# Patient Record
Sex: FEMALE | Race: BLACK OR AFRICAN AMERICAN | NOT HISPANIC OR LATINO | Employment: FULL TIME | URBAN - METROPOLITAN AREA
[De-identification: names, ages, dates, MRNs, and addresses within clinical notes are randomized per-mention and may not be internally consistent; named-entity substitution may affect disease eponyms.]

---

## 2017-01-15 ENCOUNTER — HOSPITAL ENCOUNTER (EMERGENCY)
Facility: HOSPITAL | Age: 22
Discharge: HOME | End: 2017-01-16
Attending: EMERGENCY MEDICINE
Payer: MEDICAID

## 2017-01-15 DIAGNOSIS — R42 DIZZINESS: ICD-10-CM

## 2017-01-15 DIAGNOSIS — R51.9 HEADACHE, UNSPECIFIED HEADACHE TYPE: ICD-10-CM

## 2017-01-15 DIAGNOSIS — R55 NEAR SYNCOPE: Primary | ICD-10-CM

## 2017-01-15 DIAGNOSIS — R42 LIGHTHEADEDNESS: ICD-10-CM

## 2017-01-15 LAB
B-HCG UR QL: NEGATIVE
BILIRUB UR QL STRIP: NEGATIVE
CLARITY UR REFRACT.AUTO: CLEAR
COLOR UR AUTO: YELLOW
CTP QC/QA: YES
GLUCOSE UR QL STRIP: NEGATIVE
HGB UR QL STRIP: NEGATIVE
KETONES UR QL STRIP: NEGATIVE
LEUKOCYTE ESTERASE UR QL STRIP: NEGATIVE
NITRITE UR QL STRIP: NEGATIVE
PH UR STRIP: 7 [PH] (ref 5–8)
PROT UR QL STRIP: NEGATIVE
SP GR UR STRIP: 1 (ref 1–1.03)
URN SPEC COLLECT METH UR: NORMAL
UROBILINOGEN UR STRIP-ACNC: NEGATIVE EU/DL

## 2017-01-15 PROCEDURE — 99283 EMERGENCY DEPT VISIT LOW MDM: CPT | Mod: 25

## 2017-01-15 PROCEDURE — 96360 HYDRATION IV INFUSION INIT: CPT

## 2017-01-15 PROCEDURE — 81025 URINE PREGNANCY TEST: CPT | Performed by: PHYSICIAN ASSISTANT

## 2017-01-15 PROCEDURE — 80053 COMPREHEN METABOLIC PANEL: CPT

## 2017-01-15 PROCEDURE — 81003 URINALYSIS AUTO W/O SCOPE: CPT

## 2017-01-15 PROCEDURE — 93005 ELECTROCARDIOGRAM TRACING: CPT

## 2017-01-15 PROCEDURE — 99285 EMERGENCY DEPT VISIT HI MDM: CPT | Mod: ,,, | Performed by: PHYSICIAN ASSISTANT

## 2017-01-15 PROCEDURE — 82962 GLUCOSE BLOOD TEST: CPT

## 2017-01-15 PROCEDURE — 93010 ELECTROCARDIOGRAM REPORT: CPT | Mod: ,,, | Performed by: INTERNAL MEDICINE

## 2017-01-15 PROCEDURE — 85025 COMPLETE CBC W/AUTO DIFF WBC: CPT

## 2017-01-15 PROCEDURE — 84443 ASSAY THYROID STIM HORMONE: CPT

## 2017-01-15 RX ORDER — ACETAMINOPHEN 325 MG/1
650 TABLET ORAL
Status: DISCONTINUED | OUTPATIENT
Start: 2017-01-15 | End: 2017-01-16 | Stop reason: HOSPADM

## 2017-01-15 RX ADMIN — SODIUM CHLORIDE 1000 ML: 0.9 INJECTION, SOLUTION INTRAVENOUS at 11:01

## 2017-01-15 NOTE — ED AVS SNAPSHOT
[facility]  [address]  [facility] [id number]               Bhargavi Hitchcock   1/15/2017  9:55 PM   ED    Description:  Female : 1995   Department:  [facility]           Your Care was Coordinated By:     Provider Role From To    Bruce Rolle MD Attending Provider 01/15/17 8583 --    Kathi Yang PA-C Physician Assistant 01/15/17 2989 --      Reason for Visit     Loss of Consciousness           Diagnoses this Visit        Comments    Near syncope    -  Primary     Lightheadedness         Dizziness         Headache, unspecified headache type           ED Disposition     ED Disposition Condition Comment    Discharge             To Do List           Follow-up Information     Schedule an appointment as soon as possible for a visit with [facility] - Internal Medicine.    Specialty:  Internal Medicine    Contact information:    [address] [facility] [id number] [phone]    Additional information:    [facility].        Schedule an appointment as soon as possible for a visit with [facility] - Cardiology.    Specialty:  Cardiology    Contact information:    [address] [facility] [id number] [phone]    Additional information:    3rd floor      [facility]     [facility] - 24/ Assistance  Registered nurses in the [facility] provide clinical advisement, health education, appointment booking, and other advisory services.  Call for this free service at [phone].             Medications           These medications were administered today        Dose Freq    sodium chloride 0.9% bolus 1,000 mL 1,000 mL Once    Starting on: 2017    Sig: Inject 1,000 mLs into the vein once.    Class: Normal    Route: Intravenous    Cosign for Ordering: Required by Bruce Rolle MD    acetaminophen tablet 650 mg 650 mg ED 1 Time    Sig: Take 2  "tablets (650 mg total) by mouth ED 1 Time.    Class: Normal    Route: Oral    Cosign for Ordering: Required by Bruce Rolle MD           Verify that the below list of medications is an accurate representation of the medications you are currently taking.  If none reported, the list may be blank. If incorrect, please contact your healthcare provider. Carry this list with you in case of emergency.           Current Medications     acetaminophen tablet 650 mg Take 2 tablets (650 mg total) by mouth ED 1 Time.    hydrocodone-acetaminophen (LORTAB) 7.5-500 mg/15 mL solution Take by mouth 4 (four) times daily as needed for Pain.           Clinical Reference Information           Your Vitals Were     BP Pulse Resp Height Weight Last Period    104/62 (Patient Position: Standing) 70 18 5' 2" (1.575 m) 59 kg (130 lb) 12/15/2016 (Approximate)    SpO2 BMI             100% 23.78 kg/m2         Allergies as of 1/16/2017     No Known Allergies      Immunizations Administered on Date of Encounter - 1/16/2017     None      ED Micro, Lab, POCT     Start Ordered       Status Ordering Provider    01/15/17 2359 01/15/17 2359  POCT glucose  Once      Final result     01/15/17 2326 01/15/17 2325  TSH  STAT      Final result     01/15/17 2322 01/15/17 2321  POCT glucose  Once      Acknowledged     01/15/17 2321 01/15/17 2321  CBC auto differential  STAT      Final result     01/15/17 2321 01/15/17 2321  Comprehensive metabolic panel  STAT      Final result     01/15/17 2321 01/15/17 2321  Urinalysis Clean Catch  STAT      Final result     01/15/17 2249 01/15/17 2248  POCT urine pregnancy  Once      Final result       ED Imaging Orders     None        Discharge Instructions       Follow up with your PCP and cardiology. Continue all at home medications. Return to the ED for any new or worsening symptoms, including those listed below.        Self-Care for Headaches  Most headaches aren't serious and can be relieved with self-care. But some " "headaches may be a sign of another health problem like eye trouble or high blood pressure. To find the best treatment, learn what kind of headaches you get. For tension headaches, self-care will usually help. To treat migraines, ask your healthcare provider for advice. It is also possible to get both tension and migraine headaches. Self-care involves relieving the pain and avoiding headache triggers if you can.    Ways to reduce pain and tension  Try these steps:  · Apply a cold compress or ice pack to the pain site.  · Drink fluids. If nausea makes it hard to drink, try sucking on ice.  · Rest. Protect yourself from bright light and loud noises.  · Calm your emotions by imagining a peaceful scene.  · Massage tight neck, shoulder, and head muscles.  · To relax muscles, soak in a hot bath or use a hot shower.  Use medicines  Aspirin or aspirin substitutes, such as ibuprofen and acetaminophen, can relieve headache. Remember: Never give aspirin to anyone 18 years old or younger because of the risk of developing Reye syndrome. Use pain medicines only when necessary.  Track your headaches  Keeping a headache diary can help you and your healthcare provider identify what's causing your headaches:  · Note when each headache happens.  · Identify your activities and the foods you've eaten 6 to 8 hours before the headache began.  · Look for any trends or "triggers."  Signs of tension headache  Any of the following can be signs:  · Dull pain or feeling of pressure in a tight band around your head  · Pain in your neck or shoulders  · Headache without a definite beginning or end  · Headache after an activity such as driving or working on a computer  Signs of migraine  Any of the following can be signs:  · Throbbing pain on one or both sides of your head  · Nausea or vomiting  · Extreme sensitivity to light, sound, and smells  · Bright spots, flashes, or other visual changes  · Pain or nausea so severe that you can't continue " "your daily activities  Call your healthcare provider   If you have any of the following symptoms, contact your healthcare provider:  · A headache that lingers after a recent injury or bump to the head.  · A fever with a stiff neck or pain when you bend your head toward your chest.  · A headache along with slurred speech, changes in your vision, or numbness or weakness in your arms or legs.  · A headache for longer than 3 days.  · Frequent headaches, especially in the morning.  · Headaches with seizures   · Seek immediate medical attention if you have a headache that you would call "the worst headache you have ever had."   © 7983-4310 [employer]. [address] [place] [address], [place] [address]. All rights reserved. This information is not intended as a substitute for professional medical care. Always follow your healthcare professional's instructions.        Near-Fainting: Uncertain Cause  Fainting (syncope) is a temporary loss of consciousness ("passing out"). This happens when blood flow to the brain is reduced. Near-fainting ("near-syncope") is like fainting, but you do not fully "pass out." Instead, you feel like you are going to pass out, but do not actually lose consciousness.  Signs and symptoms  The following are symptoms of near-fainting:  · Feeling lightheaded or like you are going to faint  · Weak pulse  · Nausea  · Sweating  · Blurred vision  · Palpitations  · Chest pain  · Hard time breathing  · Feeling cool and clammy  Causes  This happens when your blood pressure suddenly drops, and not enough blood flows to your brain.  Common minor causes include:  · Sudden emotional stress such as fear, pain, panic, sight of blood  · Straining or overexertion, such as straining while using the toilet, coughing, sneezing  · Standing up too quickly, or standing up for too long a time  · Pregnancy  More serious causes include:  · Very slow, fast, or irregular heart rate, an arrhythmia  · Dehydration  · Blood " loss  · Medications, especially blood pressure or heart medicines, or your medicines have recently changed  · Heart attack  · Heart valve problems  Remember, even minor causes can become serious if you fall and injure yourself, or are driving. The exact cause of your episode is not certain. More tests may be needed. It is very important that you follow up with your doctor as advised.  Home care  The following guidelines will help you care for yourself at home:  1. Rest today. Resume your normal activities as soon as you are feeling back to normal.  2. If you become lightheaded or dizzy, lie down right away or sit with your head between your knees.  3. Because we do not know the exact cause of your near fainting spell, another spell could occur without warning. Therefore, do not drive a car or use dangerous equipment. Do not take a bath alone (use a shower instead). Do not swim alone. You can resume these activities when your doctor says that you are no longer in danger of having a near fainting spell.  4. Stay well hydrated by drinking enough fluid each day.  Follow-up care  Follow up with your doctor as instructed.  Call 911  Call 911 if any of the following occur:  · Another fainting spell occurs, and it is not explained by the common causes listed above  · Chest, arm, neck, jaw, back or abdominal pain  · Shortness of breath  · Weakness, tingling, or numbness in one side of the face, one arm or leg  · Slurred speech, confusion, trouble walking or seeing  · Seizure  · Blood in vomit or stools (black or red color)  When to seek medical care  Get prompt medical attention if any of the following occur:  · Recent changes in your medications  · Occasional mild lightheadedness, especially when standing up too quickly or straining  · (In women) unexpected vaginal bleeding  © 8076-4490 [employer]. [address] [place] [address], [place] [address]. All rights reserved. This information is not intended as a  substitute for professional medical care. Always follow your healthcare professional's instructions.             [facility] complies with applicable Federal civil rights laws and does not discriminate on the basis of race, color, national origin, age, disability, or sex.        Language Assistance Services     ATTENTION: Language assistance services are available, free of charge. Please call [phone].      ATENCIÓN: Si habla español, tiene a rizo disposición servicios gratuitos de asistencia lingüística. Llame al [phone].     CHÚ Ý: N?u b?n nói Ti?ng Vi?t, có các d?ch v? h? tr? ngôn ng? mi?n phí dành cho b?n. G?i s? [phone].

## 2017-01-16 VITALS
HEART RATE: 66 BPM | OXYGEN SATURATION: 98 % | BODY MASS INDEX: 23.92 KG/M2 | DIASTOLIC BLOOD PRESSURE: 67 MMHG | WEIGHT: 130 LBS | HEIGHT: 62 IN | RESPIRATION RATE: 16 BRPM | SYSTOLIC BLOOD PRESSURE: 99 MMHG

## 2017-01-16 LAB
ALBUMIN SERPL BCP-MCNC: 3.9 G/DL
ALP SERPL-CCNC: 46 U/L
ALT SERPL W/O P-5'-P-CCNC: 13 U/L
ANION GAP SERPL CALC-SCNC: 9 MMOL/L
AST SERPL-CCNC: 20 U/L
BASOPHILS # BLD AUTO: 0.08 K/UL
BASOPHILS NFR BLD: 0.7 %
BILIRUB SERPL-MCNC: 0.2 MG/DL
BUN SERPL-MCNC: 6 MG/DL
CALCIUM SERPL-MCNC: 9 MG/DL
CHLORIDE SERPL-SCNC: 105 MMOL/L
CO2 SERPL-SCNC: 26 MMOL/L
CREAT SERPL-MCNC: 0.8 MG/DL
DIFFERENTIAL METHOD: ABNORMAL
EOSINOPHIL # BLD AUTO: 0.2 K/UL
EOSINOPHIL NFR BLD: 2.1 %
ERYTHROCYTE [DISTWIDTH] IN BLOOD BY AUTOMATED COUNT: 15.2 %
EST. GFR  (AFRICAN AMERICAN): >60 ML/MIN/1.73 M^2
EST. GFR  (NON AFRICAN AMERICAN): >60 ML/MIN/1.73 M^2
GLUCOSE SERPL-MCNC: 97 MG/DL
HCT VFR BLD AUTO: 34.8 %
HGB BLD-MCNC: 11.2 G/DL
LYMPHOCYTES # BLD AUTO: 4.7 K/UL
LYMPHOCYTES NFR BLD: 41.7 %
MCH RBC QN AUTO: 25.9 PG
MCHC RBC AUTO-ENTMCNC: 32.2 %
MCV RBC AUTO: 80 FL
MONOCYTES # BLD AUTO: 0.8 K/UL
MONOCYTES NFR BLD: 7 %
NEUTROPHILS # BLD AUTO: 5.4 K/UL
NEUTROPHILS NFR BLD: 48.2 %
PLATELET # BLD AUTO: 248 K/UL
PMV BLD AUTO: 11.2 FL
POCT GLUCOSE: 107 MG/DL (ref 70–110)
POTASSIUM SERPL-SCNC: 3.4 MMOL/L
PROT SERPL-MCNC: 7.4 G/DL
RBC # BLD AUTO: 4.33 M/UL
SODIUM SERPL-SCNC: 140 MMOL/L
TSH SERPL DL<=0.005 MIU/L-ACNC: 2.57 UIU/ML
WBC # BLD AUTO: 11.19 K/UL

## 2017-01-16 PROCEDURE — 25000003 PHARM REV CODE 250: Performed by: PHYSICIAN ASSISTANT

## 2017-01-16 NOTE — ED NOTES
LOC: The patient is awake and alert; oriented x 3 and speaking appropriately.  APPEARANCE: Patient resting comfortably, patient is clean and well groomed  SKIN: warm and dry, normal skin turgor & moist mucus membranes, skin intact, no breakdown noted.  MUSCULOSKELETAL: Patient moving all extremities well, no obvious swelling or deformities noted  RESPIRATORY: Airway is open and patent, breath sounds clear throughout all lung fields; respirations are spontaneous, normal effort and rate  CARDIAC: Patient has a normal rate, no peripheral edema noted, capillary refill < 3 seconds; No complaints of chest pain   ABDOMEN: Soft and non tender to palpation, no distention noted. Bowel sounds present x 4

## 2017-01-16 NOTE — ED PROVIDER NOTES
"Encounter Date: 1/15/2017    SCRIBE #1 NOTE: I, Kenisha Blankenship MD, am scribing for, and in the presence of,  Bruce Rolle MD. I have scribed the following portions of the note - the EKG reading.       History     Chief Complaint   Patient presents with    Loss of Consciousness     near syncopal while at work.      Review of patient's allergies indicates:  No Known Allergies  HPI Comments: 21-year-old -American female past medical history of a benign right breast tumor, depression, anxiety, PTSD presents to the ED complaining of an episode of near syncope while at work today.  She works as a  and was helping a customer when she began very lightheaded and dizzy and fell backwards.  She was caught by her coworker and denies any head trauma or loss of consciousness.  She presented to the ED via EMS.  She developed a left-sided headache after this episode which she reports is a 2-3/10 "dull".  She's been having these intermittent episodes for years and has been worked up with imaging in the past.  After workup was initiated, she lost insurance and has not been seen for this since.  The last time she had an episode of near syncope was about 6 months ago but has been having intermittent lightheadedness since then.  She does report some dysuria, vaginal discharge, vaginal irritation for the past week.  She denies fever, chills, chest pain, shortness of breath, numbness, weakness, changes in vision, changes in speech, changes in gait, abdominal pain, nausea, vomiting, hematuria, vaginal bleeding.  She smokes marijuana, recreationally uses ecstasy and cocaine and denies tobacco or alcohol use.    The history is provided by the patient.     Past Medical History   Diagnosis Date    Anxiety     Depression     PTSD (post-traumatic stress disorder)     Tumor of breast      benign tumor in right breast removed     No past medical history pertinent negatives.  Past Surgical History   Procedure Laterality Date "    Breast surgery       Tumor removed from right breast    Tonsillectomy      Nasal septum surgery      Nose surgery       Family History   Problem Relation Age of Onset    Breast cancer Neg Hx     Colon cancer Neg Hx     Ovarian cancer Neg Hx      Social History   Substance Use Topics    Smoking status: Never Smoker    Smokeless tobacco: Never Used    Alcohol use Yes      Comment: social     Review of Systems   Constitutional: Positive for chills. Negative for fever.   HENT: Negative for congestion, rhinorrhea and sore throat.    Eyes: Negative for photophobia and visual disturbance.   Respiratory: Negative for shortness of breath.    Cardiovascular: Negative for chest pain, palpitations and leg swelling.   Gastrointestinal: Negative for abdominal pain, constipation, diarrhea, nausea and vomiting.   Genitourinary: Positive for dysuria and vaginal discharge. Negative for hematuria and vaginal bleeding.   Musculoskeletal: Negative for back pain, gait problem, neck pain and neck stiffness.   Skin: Negative for rash and wound.   Neurological: Positive for dizziness, light-headedness and headaches. Negative for tremors, seizures, syncope, facial asymmetry, speech difficulty, weakness and numbness.   Psychiatric/Behavioral: Negative for confusion.       Physical Exam   Initial Vitals   BP Pulse Resp Temp SpO2   01/15/17 2030 01/15/17 2030 01/15/17 2030 -- 01/15/17 2030   108/77 70 18  100 %     Physical Exam    Nursing note and vitals reviewed.  Constitutional: She appears well-developed and well-nourished. She is not diaphoretic. No distress.   HENT:   Head: Normocephalic and atraumatic.   Eyes: EOM are normal. Pupils are equal, round, and reactive to light.   Neck: Normal range of motion. Neck supple.   Cardiovascular: Normal rate, regular rhythm and normal heart sounds. Exam reveals no gallop and no friction rub.    No murmur heard.  Pulmonary/Chest: Breath sounds normal. She has no wheezes. She has no  rhonchi. She has no rales.   Abdominal: Soft. Bowel sounds are normal. There is no tenderness. There is no rebound and no guarding.   Neurological: She is alert and oriented to person, place, and time. She has normal strength. No cranial nerve deficit or sensory deficit. She displays a negative Romberg sign. Coordination and gait normal. GCS eye subscore is 4. GCS verbal subscore is 5. GCS motor subscore is 6.   Normal finger to nose and rapid alternating movements   Skin: Skin is warm and dry. No rash noted. No erythema.         ED Course   Procedures  Labs Reviewed   CBC W/ AUTO DIFFERENTIAL - Abnormal; Notable for the following:        Result Value    Hemoglobin 11.2 (*)     Hematocrit 34.8 (*)     MCV 80 (*)     MCH 25.9 (*)     RDW 15.2 (*)     All other components within normal limits   COMPREHENSIVE METABOLIC PANEL - Abnormal; Notable for the following:     Potassium 3.4 (*)     Alkaline Phosphatase 46 (*)     All other components within normal limits   URINALYSIS   TSH   POCT URINE PREGNANCY   POCT GLUCOSE   POCT GLUCOSE MONITORING CONTINUOUS     EKG Readings: (Independently Interpreted)   EKG: NSR, no DENIZ's or STD's, non-specific twave pattern, no STEMI            Medical Decision Making:   History:   Old Medical Records: I decided to obtain old medical records.  Old Records Summarized: records from previous admission(s).       <> Summary of Records: Seen in the ED in June 2016 with similar complaints.  CT head at that time with no abnormalities.   Independently Interpreted Test(s):   I have ordered and independently interpreted EKG Reading(s) - see prior notes  Clinical Tests:   Lab Tests: Ordered and Reviewed  Medical Tests: Ordered and Reviewed       APC / Resident Notes:   21-year-old -American female past medical history of a benign right breast tumor, depression, anxiety, PTSD presents to the ED complaining of an episode of near syncope while at work today.  Vital signs stable.  Regular rate  and rhythm without murmurs.  Lungs are clear to auscultation bilaterally without wheezes.  Abdomen is soft and nontender to palpation.  Neurologically intact without any focal neuro deficits.  Differential diagnosis includes but isn't limited to vasovagal syncope, hypotension, hypoglycemia, anemia, pregnancy, dehydration, anxiety, electrolyte abnormality, UTI, cardiac arrhythmia.  Will get labs, give IV fluids and reassess.    EKG shows normal sinus rhythm with no STEMI or signs of ischemia.    UPT negative.  Fingerstick glucose 107.  CBC with no signs of leukocytosis with WBC 11.19.  Minimal anemia noted with H/H 11.2/34.8.  CMP shows minimal hypokalemia with potassium of lab 3.4. No other acute abnormalities.  UA with no signs of infection.  TSH WNL at 2.574.     She declined pelvic exam in the ED for workup of vaginal discharge stating that she will follow up with her PCP.     I do not feel the patient is a further labs or imaging at this time.  Stable for discharge.    She was discharged without any prescriptions.  She will follow up with her PCP and cardiology.  All of the patient's questions were answered.  I reviewed the patient's chart and labs and discussed the case with my supervising physician.          Scribe Attestation:   Scribe #1: I performed the above scribed service and the documentation accurately describes the services I performed. I attest to the accuracy of the note.    Attending Attestation:     Physician Attestation Statement for NP/PA:   I discussed this assessment and plan of this patient with the NP/PA, but I did not personally examine the patient. The face to face encounter was performed by the NP/PA.        Physician Attestation for Scribe:  Physician Attestation Statement for Scribe #1: I, Bruce Rolle MD, reviewed documentation, as scribed by Kenisha Blankenship in my presence, and it is both accurate and complete.                 ED Course     Clinical Impression:   The primary encounter  diagnosis was Near syncope. Diagnoses of Lightheadedness, Dizziness, and Headache, unspecified headache type were also pertinent to this visit.    Disposition:   Disposition: Discharged  Condition: Stable       Kathi Yang PA-C  01/16/17 0101       Bruce Rolle MD  01/16/17 0339

## 2017-01-16 NOTE — DISCHARGE INSTRUCTIONS
"Follow up with your PCP and cardiology. Continue all at home medications. Return to the ED for any new or worsening symptoms, including those listed below.        Self-Care for Headaches  Most headaches aren't serious and can be relieved with self-care. But some headaches may be a sign of another health problem like eye trouble or high blood pressure. To find the best treatment, learn what kind of headaches you get. For tension headaches, self-care will usually help. To treat migraines, ask your healthcare provider for advice. It is also possible to get both tension and migraine headaches. Self-care involves relieving the pain and avoiding headache triggers if you can.    Ways to reduce pain and tension  Try these steps:  · Apply a cold compress or ice pack to the pain site.  · Drink fluids. If nausea makes it hard to drink, try sucking on ice.  · Rest. Protect yourself from bright light and loud noises.  · Calm your emotions by imagining a peaceful scene.  · Massage tight neck, shoulder, and head muscles.  · To relax muscles, soak in a hot bath or use a hot shower.  Use medicines  Aspirin or aspirin substitutes, such as ibuprofen and acetaminophen, can relieve headache. Remember: Never give aspirin to anyone 18 years old or younger because of the risk of developing Reye syndrome. Use pain medicines only when necessary.  Track your headaches  Keeping a headache diary can help you and your healthcare provider identify what's causing your headaches:  · Note when each headache happens.  · Identify your activities and the foods you've eaten 6 to 8 hours before the headache began.  · Look for any trends or "triggers."  Signs of tension headache  Any of the following can be signs:  · Dull pain or feeling of pressure in a tight band around your head  · Pain in your neck or shoulders  · Headache without a definite beginning or end  · Headache after an activity such as driving or working on a computer  Signs of " "migraine  Any of the following can be signs:  · Throbbing pain on one or both sides of your head  · Nausea or vomiting  · Extreme sensitivity to light, sound, and smells  · Bright spots, flashes, or other visual changes  · Pain or nausea so severe that you can't continue your daily activities  Call your healthcare provider   If you have any of the following symptoms, contact your healthcare provider:  · A headache that lingers after a recent injury or bump to the head.  · A fever with a stiff neck or pain when you bend your head toward your chest.  · A headache along with slurred speech, changes in your vision, or numbness or weakness in your arms or legs.  · A headache for longer than 3 days.  · Frequent headaches, especially in the morning.  · Headaches with seizures   · Seek immediate medical attention if you have a headache that you would call "the worst headache you have ever had."   © 0506-3626 [employer]. [address] [place] [address]. All rights reserved. This information is not intended as a substitute for professional medical care. Always follow your healthcare professional's instructions.        Near-Fainting: Uncertain Cause  Fainting (syncope) is a temporary loss of consciousness ("passing out"). This happens when blood flow to the brain is reduced. Near-fainting ("near-syncope") is like fainting, but you do not fully "pass out." Instead, you feel like you are going to pass out, but do not actually lose consciousness.  Signs and symptoms  The following are symptoms of near-fainting:  · Feeling lightheaded or like you are going to faint  · Weak pulse  · Nausea  · Sweating  · Blurred vision  · Palpitations  · Chest pain  · Hard time breathing  · Feeling cool and clammy  Causes  This happens when your blood pressure suddenly drops, and not enough blood flows to your brain.  Common minor causes include:  · Sudden emotional stress such as fear, pain, panic, sight of " blood  · Straining or overexertion, such as straining while using the toilet, coughing, sneezing  · Standing up too quickly, or standing up for too long a time  · Pregnancy  More serious causes include:  · Very slow, fast, or irregular heart rate, an arrhythmia  · Dehydration  · Blood loss  · Medications, especially blood pressure or heart medicines, or your medicines have recently changed  · Heart attack  · Heart valve problems  Remember, even minor causes can become serious if you fall and injure yourself, or are driving. The exact cause of your episode is not certain. More tests may be needed. It is very important that you follow up with your doctor as advised.  Home care  The following guidelines will help you care for yourself at home:  1. Rest today. Resume your normal activities as soon as you are feeling back to normal.  2. If you become lightheaded or dizzy, lie down right away or sit with your head between your knees.  3. Because we do not know the exact cause of your near fainting spell, another spell could occur without warning. Therefore, do not drive a car or use dangerous equipment. Do not take a bath alone (use a shower instead). Do not swim alone. You can resume these activities when your doctor says that you are no longer in danger of having a near fainting spell.  4. Stay well hydrated by drinking enough fluid each day.  Follow-up care  Follow up with your doctor as instructed.  Call 911  Call 911 if any of the following occur:  · Another fainting spell occurs, and it is not explained by the common causes listed above  · Chest, arm, neck, jaw, back or abdominal pain  · Shortness of breath  · Weakness, tingling, or numbness in one side of the face, one arm or leg  · Slurred speech, confusion, trouble walking or seeing  · Seizure  · Blood in vomit or stools (black or red color)  When to seek medical care  Get prompt medical attention if any of the following occur:  · Recent changes in your  medications  · Occasional mild lightheadedness, especially when standing up too quickly or straining  · (In women) unexpected vaginal bleeding  © 9566-3447 [employer]. [address] [place] [address], [place] [address]. All rights reserved. This information is not intended as a substitute for professional medical care. Always follow your healthcare professional's instructions.

## 2017-01-16 NOTE — ED TRIAGE NOTES
"Pt presents after near syncopal episode today at work. Pt states she has been "seeing stars" for a dew days. Pt reports that this has happened several times in the past and she was not given a dx. Pt states she is hypoglycemic but has been eating regularly. Pt also reports HA and states she has been getting frequent HA which are usually to the front of her head or a sharp pain to the left temple.  "

## 2017-10-30 ENCOUNTER — LAB VISIT (OUTPATIENT)
Dept: LAB | Facility: OTHER | Age: 22
End: 2017-10-30
Attending: STUDENT IN AN ORGANIZED HEALTH CARE EDUCATION/TRAINING PROGRAM
Payer: MEDICAID

## 2017-10-30 ENCOUNTER — OFFICE VISIT (OUTPATIENT)
Dept: OBSTETRICS AND GYNECOLOGY | Facility: CLINIC | Age: 22
End: 2017-10-30
Payer: MEDICAID

## 2017-10-30 VITALS
SYSTOLIC BLOOD PRESSURE: 110 MMHG | HEIGHT: 61 IN | WEIGHT: 137.56 LBS | DIASTOLIC BLOOD PRESSURE: 64 MMHG | BODY MASS INDEX: 25.97 KG/M2

## 2017-10-30 DIAGNOSIS — Z01.419 WOMEN'S ANNUAL ROUTINE GYNECOLOGICAL EXAMINATION: Primary | ICD-10-CM

## 2017-10-30 DIAGNOSIS — Z01.419 WOMEN'S ANNUAL ROUTINE GYNECOLOGICAL EXAMINATION: ICD-10-CM

## 2017-10-30 LAB
CANDIDA RRNA VAG QL PROBE: NEGATIVE
G VAGINALIS RRNA GENITAL QL PROBE: POSITIVE
T VAGINALIS RRNA GENITAL QL PROBE: NEGATIVE

## 2017-10-30 PROCEDURE — 87591 N.GONORRHOEAE DNA AMP PROB: CPT

## 2017-10-30 PROCEDURE — 88175 CYTOPATH C/V AUTO FLUID REDO: CPT

## 2017-10-30 PROCEDURE — 99999 PR PBB SHADOW E&M-EST. PATIENT-LVL III: CPT | Mod: PBBFAC,,, | Performed by: STUDENT IN AN ORGANIZED HEALTH CARE EDUCATION/TRAINING PROGRAM

## 2017-10-30 PROCEDURE — 99213 OFFICE O/P EST LOW 20 MIN: CPT | Mod: PBBFAC,PO | Performed by: STUDENT IN AN ORGANIZED HEALTH CARE EDUCATION/TRAINING PROGRAM

## 2017-10-30 PROCEDURE — 36415 COLL VENOUS BLD VENIPUNCTURE: CPT

## 2017-10-30 PROCEDURE — 87102 FUNGUS ISOLATION CULTURE: CPT

## 2017-10-30 PROCEDURE — 86592 SYPHILIS TEST NON-TREP QUAL: CPT

## 2017-10-30 PROCEDURE — 87480 CANDIDA DNA DIR PROBE: CPT

## 2017-10-30 PROCEDURE — 86703 HIV-1/HIV-2 1 RESULT ANTBDY: CPT

## 2017-10-30 PROCEDURE — 87660 TRICHOMONAS VAGIN DIR PROBE: CPT

## 2017-10-30 PROCEDURE — 87106 FUNGI IDENTIFICATION YEAST: CPT

## 2017-10-30 PROCEDURE — 87624 HPV HI-RISK TYP POOLED RSLT: CPT

## 2017-10-30 PROCEDURE — 80074 ACUTE HEPATITIS PANEL: CPT

## 2017-10-30 PROCEDURE — 99385 PREV VISIT NEW AGE 18-39: CPT | Mod: S$PBB,,, | Performed by: STUDENT IN AN ORGANIZED HEALTH CARE EDUCATION/TRAINING PROGRAM

## 2017-10-30 PROCEDURE — 87107 FUNGI IDENTIFICATION MOLD: CPT

## 2017-10-30 NOTE — PROGRESS NOTES
History & Physical  Gynecology      SUBJECTIVE:     Chief Complaint: Gynecologic Exam (complains of recurrent yeast and BV infections, also would like to be recheck for chlamydia )     History of Present Illness:  Patient is here for annual well-woman exam, age 21.    Patient is sexually active. Remote history of chlamydia. S/p treatment, re-infection, and re-treatment. No current sexual partner. Not on birth control. Previously has used imp/nexplanon, nuvaring, pills, depo provera. Has not liked how she felt on any BC. Declines BC today.     Patient reports recurrent BV. Thin discharge, erythema of vagina/vulva, pruritis. Also has yeast infections occasionally. Does not think she has an infection at this time. LMP was last week. Periods are regular with normal flow.     - Vaccines: discussed flu shot, s/p gardisil as an adolescent  - Labs: TSH not indicated; Lipid panel not indicated  - STD: GCCT today  - Pap: due today  - Breast: MMG at age 40   - Colonoscopy: at age 40-50  - DEXA: due at age 65    Review of patient's allergies indicates:  No Known Allergies    Past Medical History:   Diagnosis Date    Anxiety     Depression     PTSD (post-traumatic stress disorder)     Tumor of breast     benign tumor in right breast removed     Past Surgical History:   Procedure Laterality Date    BREAST SURGERY      Tumor removed from right breast    NASAL SEPTUM SURGERY      NOSE SURGERY      TONSILLECTOMY       OB History      Para Term  AB Living    0 0 0 0 0 0    SAB TAB Ectopic Multiple Live Births    0 0 0 0          Family History   Problem Relation Age of Onset    Breast cancer Neg Hx     Colon cancer Neg Hx     Ovarian cancer Neg Hx      Social History   Substance Use Topics    Smoking status: Light Tobacco Smoker    Smokeless tobacco: Never Used    Alcohol use Yes      Comment: social       Current Outpatient Prescriptions   Medication Sig    hydrocodone-acetaminophen (LORTAB) 7.5-500  mg/15 mL solution Take by mouth 4 (four) times daily as needed for Pain.     No current facility-administered medications for this visit.      Review of Systems:  Review of Systems   Constitutional: Negative for activity change, chills and fatigue.   Eyes: Negative for visual disturbance.   Respiratory: Negative for shortness of breath.    Cardiovascular: Negative for chest pain and palpitations.   Gastrointestinal: Negative for abdominal pain, constipation, diarrhea, nausea and vomiting.   Genitourinary: Negative for dysuria, vaginal bleeding, vaginal discharge, vaginal pain and vaginal odor.   Musculoskeletal: Negative for myalgias.   Skin:  Negative for rash.   Neurological: Negative for headaches.   Psychiatric/Behavioral: Negative for depression.        OBJECTIVE:     Physical Exam:  Physical Exam   Constitutional: She is oriented to person, place, and time. She appears well-developed and well-nourished.   HENT:   Head: Normocephalic and atraumatic.   Eyes: EOM are normal. Pupils are equal, round, and reactive to light.   Neck: Normal range of motion. Neck supple. No tracheal deviation present. No thyromegaly present.   Cardiovascular: Normal rate and regular rhythm.    Pulmonary/Chest: Effort normal. Right breast exhibits no inverted nipple, no mass, no nipple discharge, no skin change and no tenderness. Left breast exhibits no inverted nipple, no mass, no nipple discharge, no skin change and no tenderness.   Abdominal: Soft.   Genitourinary: Vagina normal and uterus normal.   Musculoskeletal: Normal range of motion.   Neurological: She is alert and oriented to person, place, and time.   Skin: Skin is warm and dry.   Psychiatric: She has a normal mood and affect.   Nursing note and vitals reviewed.    ASSESSMENT:       ICD-10-CM ICD-9-CM    1. Women's annual routine gynecological examination Z01.419 V72.31 Liquid-based pap smear, screening      CULTURE, FUNGUS      Vaginosis Screen by DNA Probe      C.  trachomatis/N. gonorrhoeae by AMP DNA Cervix      HIV-1 and HIV-2 antibodies      RPR      Hepatitis panel, acute      POCT Wet Prep      Plan:      Bhargavi was seen today for gynecologic exam.    Diagnoses and all orders for this visit:    Women's annual routine gynecological examination  -     Liquid-based pap smear, screening  -     CULTURE, FUNGUS  -     Vaginosis Screen by DNA Probe  -     C. trachomatis/N. gonorrhoeae by AMP DNA Cervix  -     HIV-1 and HIV-2 antibodies; Future  -     RPR; Future  -     Hepatitis panel, acute; Future  -     POCT Wet Prep: normal, no trichomonas present  -     HPV cotest ordered    Discussed BC options: patient declined  Discussed common triggers of BV/yeast   Discussed flu shot  [facility] to [facility] for [facility]D labs ordered  Will f/u results with her when available  RTC annually for WWE or PRN    Orders Placed This Encounter   Procedures    CULTURE, FUNGUS    Vaginosis Screen by DNA Probe    C. trachomatis/N. gonorrhoeae by AMP DNA Cervix    HIV-1 and HIV-2 antibodies    RPR    Hepatitis panel, acute    POCT Wet Prep     Return in about 1 year (around 10/30/2018) for Annual Well-Woman Exam.    Counseling time: 30 minutes    Britt Godoy     Doing well, no questions,no problems.Not interested in contraception, labs for vaginitis done, wet prep negative.  I have reviewed the resident's note, evaluated the patient and agree with the diagnosis and management plan

## 2017-10-31 LAB
C TRACH DNA SPEC QL NAA+PROBE: NOT DETECTED
HAV IGM SERPL QL IA: NEGATIVE
HBV CORE IGM SERPL QL IA: NEGATIVE
HBV SURFACE AG SERPL QL IA: NEGATIVE
HCV AB SERPL QL IA: NEGATIVE
HIV 1+2 AB+HIV1 P24 AG SERPL QL IA: NEGATIVE
N GONORRHOEA DNA SPEC QL NAA+PROBE: NOT DETECTED
RPR SER QL: NORMAL

## 2017-11-02 LAB
HPV16 AG SPEC QL: NEGATIVE
HPV16+18+H RISK 12 DNA CVX-IMP: POSITIVE
HPV18 DNA SPEC QL NAA+PROBE: NEGATIVE

## 2017-11-09 ENCOUNTER — PATIENT MESSAGE (OUTPATIENT)
Dept: OBSTETRICS AND GYNECOLOGY | Facility: HOSPITAL | Age: 22
End: 2017-11-09

## 2017-11-09 RX ORDER — FLUCONAZOLE 150 MG/1
150 TABLET ORAL DAILY
Qty: 1 TABLET | Refills: 1 | Status: SHIPPED | OUTPATIENT
Start: 2017-11-09 | End: 2017-11-10

## 2017-11-09 NOTE — TELEPHONE ENCOUNTER
Fluconazole sent to the patient's pharmacy for positive yeast culture    Britt Godoy MD, PhD  OBGYN, PGY-2

## 2017-12-06 LAB — FUNGUS SPEC CULT: NORMAL

## 2018-10-08 ENCOUNTER — HOSPITAL ENCOUNTER (EMERGENCY)
Facility: HOSPITAL | Age: 23
Discharge: HOME | End: 2018-10-08
Attending: EMERGENCY MEDICINE
Payer: MEDICAID

## 2018-10-08 VITALS
SYSTOLIC BLOOD PRESSURE: 101 MMHG | HEIGHT: 61 IN | BODY MASS INDEX: 24.92 KG/M2 | HEART RATE: 50 BPM | RESPIRATION RATE: 16 BRPM | WEIGHT: 132 LBS | OXYGEN SATURATION: 99 % | TEMPERATURE: 98 F | DIASTOLIC BLOOD PRESSURE: 56 MMHG

## 2018-10-08 DIAGNOSIS — E87.6 HYPOKALEMIA: ICD-10-CM

## 2018-10-08 DIAGNOSIS — R07.9 CHEST PAIN: ICD-10-CM

## 2018-10-08 DIAGNOSIS — R51.9 ACUTE NONINTRACTABLE HEADACHE, UNSPECIFIED HEADACHE TYPE: Primary | ICD-10-CM

## 2018-10-08 LAB
ANION GAP SERPL CALC-SCNC: 10 MMOL/L
B-HCG UR QL: NEGATIVE
BASOPHILS # BLD AUTO: 0.09 K/UL
BASOPHILS NFR BLD: 1.1 %
BILIRUB UR QL STRIP: NEGATIVE
BUN SERPL-MCNC: 9 MG/DL
CALCIUM SERPL-MCNC: 9.9 MG/DL
CHLORIDE SERPL-SCNC: 103 MMOL/L
CLARITY UR REFRACT.AUTO: CLEAR
CO2 SERPL-SCNC: 27 MMOL/L
COLOR UR AUTO: NORMAL
CREAT SERPL-MCNC: 0.8 MG/DL
CTP QC/QA: YES
DIFFERENTIAL METHOD: ABNORMAL
EOSINOPHIL # BLD AUTO: 0.3 K/UL
EOSINOPHIL NFR BLD: 3.2 %
ERYTHROCYTE [DISTWIDTH] IN BLOOD BY AUTOMATED COUNT: 14.7 %
EST. GFR  (AFRICAN AMERICAN): >60 ML/MIN/1.73 M^2
EST. GFR  (NON AFRICAN AMERICAN): >60 ML/MIN/1.73 M^2
GLUCOSE SERPL-MCNC: 65 MG/DL
GLUCOSE UR QL STRIP: NEGATIVE
HCT VFR BLD AUTO: 36.6 %
HGB BLD-MCNC: 11.4 G/DL
HGB UR QL STRIP: NEGATIVE
IMM GRANULOCYTES # BLD AUTO: 0.01 K/UL
IMM GRANULOCYTES NFR BLD AUTO: 0.1 %
KETONES UR QL STRIP: NEGATIVE
LEUKOCYTE ESTERASE UR QL STRIP: NEGATIVE
LYMPHOCYTES # BLD AUTO: 3.9 K/UL
LYMPHOCYTES NFR BLD: 47.8 %
MCH RBC QN AUTO: 26.3 PG
MCHC RBC AUTO-ENTMCNC: 31.1 G/DL
MCV RBC AUTO: 84 FL
MONOCYTES # BLD AUTO: 0.6 K/UL
MONOCYTES NFR BLD: 7.1 %
NEUTROPHILS # BLD AUTO: 3.3 K/UL
NEUTROPHILS NFR BLD: 40.7 %
NITRITE UR QL STRIP: NEGATIVE
NRBC BLD-RTO: 0 /100 WBC
PH UR STRIP: 6 [PH] (ref 5–8)
PLATELET # BLD AUTO: 286 K/UL
PMV BLD AUTO: 11.3 FL
POTASSIUM SERPL-SCNC: 3.3 MMOL/L
PROT UR QL STRIP: NEGATIVE
RBC # BLD AUTO: 4.34 M/UL
SODIUM SERPL-SCNC: 140 MMOL/L
SP GR UR STRIP: 1 (ref 1–1.03)
URN SPEC COLLECT METH UR: NORMAL
UROBILINOGEN UR STRIP-ACNC: NEGATIVE EU/DL
WBC # BLD AUTO: 8.16 K/UL

## 2018-10-08 PROCEDURE — 99284 EMERGENCY DEPT VISIT MOD MDM: CPT | Mod: ,,, | Performed by: EMERGENCY MEDICINE

## 2018-10-08 PROCEDURE — 96361 HYDRATE IV INFUSION ADD-ON: CPT

## 2018-10-08 PROCEDURE — 25000003 PHARM REV CODE 250: Performed by: EMERGENCY MEDICINE

## 2018-10-08 PROCEDURE — 99284 EMERGENCY DEPT VISIT MOD MDM: CPT | Mod: 25

## 2018-10-08 PROCEDURE — 96374 THER/PROPH/DIAG INJ IV PUSH: CPT

## 2018-10-08 PROCEDURE — 80048 BASIC METABOLIC PNL TOTAL CA: CPT

## 2018-10-08 PROCEDURE — 81003 URINALYSIS AUTO W/O SCOPE: CPT

## 2018-10-08 PROCEDURE — 63600175 PHARM REV CODE 636 W HCPCS: Performed by: EMERGENCY MEDICINE

## 2018-10-08 PROCEDURE — 81025 URINE PREGNANCY TEST: CPT | Performed by: EMERGENCY MEDICINE

## 2018-10-08 PROCEDURE — 85025 COMPLETE CBC W/AUTO DIFF WBC: CPT

## 2018-10-08 RX ORDER — KETOROLAC TROMETHAMINE 30 MG/ML
15 INJECTION, SOLUTION INTRAMUSCULAR; INTRAVENOUS
Status: COMPLETED | OUTPATIENT
Start: 2018-10-08 | End: 2018-10-08

## 2018-10-08 RX ORDER — METOCLOPRAMIDE 10 MG/1
10 TABLET ORAL EVERY 6 HOURS PRN
Qty: 20 TABLET | Refills: 0 | Status: SHIPPED | OUTPATIENT
Start: 2018-10-08 | End: 2021-06-02

## 2018-10-08 RX ORDER — METOCLOPRAMIDE 5 MG/1
10 TABLET ORAL
Status: COMPLETED | OUTPATIENT
Start: 2018-10-08 | End: 2018-10-08

## 2018-10-08 RX ADMIN — SODIUM CHLORIDE 1000 ML: 0.9 INJECTION, SOLUTION INTRAVENOUS at 01:10

## 2018-10-08 RX ADMIN — ALUMINUM HYDROXIDE, MAGNESIUM HYDROXIDE, AND SIMETHICONE 50 ML: 200; 200; 20 SUSPENSION ORAL at 02:10

## 2018-10-08 RX ADMIN — KETOROLAC TROMETHAMINE 15 MG: 30 INJECTION, SOLUTION INTRAMUSCULAR at 01:10

## 2018-10-08 RX ADMIN — METOCLOPRAMIDE 10 MG: 5 TABLET ORAL at 03:10

## 2018-10-08 NOTE — ED PROVIDER NOTES
Encounter Date: 10/8/2018    SCRIBE #1 NOTE: I, Oh Mares, am scribing for, and in the presence of, Rome Hunt MD.       History     Chief Complaint   Patient presents with    Headache     Pt reports  a few times thonight while at work she had episoid of blurry vision, headache at the top of her head, and dizziness. Pt also reports some nausea with headache.      22 year old female with PMHx of anxiety/depression, PTSD, and multiple remote head traumas presents to ED with complaint headache. Patient reports an acute onset headache that has associated dizziness and diplopia.  The dizziness is clarified to be lightheadedness and NOT vertigo.  The double vision and lightheadedness was pre foot the onset of the headache and has not since resolved.  She has had 2 brief episodes of lightheadedness when standing since the onset but no further changes in vision.  She states that symptoms started after work, which she noted to be busy and stressful. Patient reports hx of vertigo however endorses that her current symptoms are not similar to vertigo in the past. Patient denies recent heavy menstrual bleeds, recent trauma, recent travel, or hx of embolism. She also complaints of neck pain for two weeks however denies fever, CP, SOB, abd pain, numbness, or any other complaints at this time.          Review of patient's allergies indicates:  No Known Allergies  Past Medical History:   Diagnosis Date    Anxiety     Depression     PTSD (post-traumatic stress disorder)     Tumor of breast     benign tumor in right breast removed     Past Surgical History:   Procedure Laterality Date    BREAST SURGERY      Tumor removed from right breast    NASAL SEPTUM SURGERY      NOSE SURGERY      TONSILLECTOMY       Family History   Problem Relation Age of Onset    Breast cancer Neg Hx     Colon cancer Neg Hx     Ovarian cancer Neg Hx      Social History     Tobacco Use    Smoking status: Light Tobacco Smoker    Smokeless  tobacco: Never Used   Substance Use Topics    Alcohol use: Yes     Comment: social    Drug use: Yes     Types: Cocaine     Review of Systems   Constitutional: Negative for fever.   HENT: Negative for sore throat.    Eyes: Positive for visual disturbance.   Respiratory: Negative for shortness of breath.    Cardiovascular: Negative for chest pain.   Gastrointestinal: Negative for nausea.   Genitourinary: Negative for dysuria.   Musculoskeletal: Negative for back pain.   Skin: Negative for rash.   Neurological: Positive for light-headedness and headaches. Negative for dizziness, weakness and numbness.   Hematological: Does not bruise/bleed easily.       Physical Exam     Initial Vitals [10/08/18 0047]   BP Pulse Resp Temp SpO2   138/75 65 18 98.4 °F (36.9 °C) 100 %      MAP       --         Physical Exam    Nursing note and vitals reviewed.  Constitutional: She appears well-developed and well-nourished. She is not diaphoretic. No distress.   HENT:   Head: Normocephalic and atraumatic.   Eyes: EOM are normal. Pupils are equal, round, and reactive to light. Right eye exhibits no discharge. Left eye exhibits no discharge. No scleral icterus.   Neck: Normal range of motion. Neck supple. No Brudzinski's sign and no Kernig's sign noted. No JVD present.   No meningeal signs; No midline cervical tenderness to palpation   Cardiovascular: Normal rate, regular rhythm, normal heart sounds and intact distal pulses. Exam reveals no gallop and no friction rub.    No murmur heard.  Pulmonary/Chest: Breath sounds normal. No respiratory distress. She has no wheezes. She has no rhonchi. She has no rales. She exhibits no tenderness.   Abdominal: Soft. Bowel sounds are normal. She exhibits no distension and no mass. There is no tenderness. There is no rebound and no guarding.   Musculoskeletal: Normal range of motion. She exhibits no edema or tenderness.   Lymphadenopathy:     She has no cervical adenopathy.   Neurological: She is alert  and oriented to person, place, and time. She has normal strength. No cranial nerve deficit or sensory deficit. She displays a negative Romberg sign.   Finger-nose test NL bilaterally.   Skin: Skin is warm and dry. Capillary refill takes less than 2 seconds.   Psychiatric: She has a normal mood and affect.         ED Course   Procedures  Labs Reviewed   CBC W/ AUTO DIFFERENTIAL - Abnormal; Notable for the following components:       Result Value    Hemoglobin 11.4 (*)     Hematocrit 36.6 (*)     MCH 26.3 (*)     MCHC 31.1 (*)     RDW 14.7 (*)     All other components within normal limits   BASIC METABOLIC PANEL - Abnormal; Notable for the following components:    Potassium 3.3 (*)     Glucose 65 (*)     All other components within normal limits   URINALYSIS, REFLEX TO URINE CULTURE    Narrative:     Preferred Collection Type->Urine, Clean Catch  Received a cup of urine.   POCT URINE PREGNANCY     EKG Readings: (Independently Interpreted)   Initial Reading: No STEMI. Rhythm: Sinus Bradycardia. Heart Rate: 55. ST Segments: Normal ST Segments. T Waves: Normal. Axis: Normal.       Imaging Results          X-Ray Chest 1 View (Final result)  Result time 10/08/18 03:32:11    Final result by Tobias Cruz MD (10/08/18 03:32:11)                 Impression:      1.  No acute radiographic findings in the chest on this single view.      Electronically signed by: Tobias Cruz MD  Date:    10/08/2018  Time:    03:32             Narrative:    EXAMINATION:  XR CHEST 1 VIEW    CLINICAL HISTORY:  Chest pain, unspecified    TECHNIQUE:  Single frontal view of the chest was performed.    COMPARISON:  Radiograph 04/04/2015.    FINDINGS:  Mediastinal structures are midline.  Cardiac silhouette is normal in size.  Hilar contours are unremarkable.  Lung volumes are symmetric.  No consolidation.  No pneumothorax or pleural effusions.  No free air beneath the diaphragm.  No acute osseous abnormalities.                               CT  Head Without Contrast (Final result)  Result time 10/08/18 03:09:35    Final result by Cesar Cohen MD (10/08/18 03:09:35)                 Impression:      No CT evidence of acute intracranial abnormality.      Electronically signed by: Cesar Cohen MD  Date:    10/08/2018  Time:    03:09             Narrative:    EXAMINATION:  CT HEAD WITHOUT CONTRAST    CLINICAL HISTORY:  Headache, acute, norm neuro exam;    TECHNIQUE:  Low dose axial images were obtained through the head.  Coronal and sagittal reformations were also performed. Contrast was not administered.    COMPARISON:  06/02/2016.    FINDINGS:  No evidence of acute territorial infarct, hemorrhage, mass effect, or midline shift.    Ventricles are normal in size and configuration.    No displaced calvarial fracture.    Visualized paranasal sinuses and mastoid air cells are clear.                                 Medical Decision Making:   Initial Assessment:   70 year old female with PMHx of anxiety/depression, PTSD, and multiple remote head injuries presents with acute HA. Neuro exam is NL, HA not severe, and patient is well-appearing, I doubt SAH. There is no fever or meningeal signs to suggest meningitis. Will obtain CT head to r/o intracranial mass and labs for metabolic processes. Will administer IVF, toradol, and reglan.    Reassessment: UA clear. CBC without leukocytosis. Hemoglobin at 11.4, at baseline. CT head without acute process. There is mild hypokalemia at 3.3 which was replaced. CXR without acute process. HA resolved at this time and symptoms have improved as per patient. I do not see any evidence of acute emergent processes. Etiology of symptoms likely atypical migraine HA. Patient provided with neurology follow up and return precautions. Will discharge on course of Reglan.                   Attending Attestation:           Physician Attestation for Scribe:      Comments: I, Dr. Rome Hunt, personally performed the services described  in this documentation. All medical record entries made by the scribe were at my direction and in my presence.  I have reviewed the chart and agree that the record reflects my personal performance and is accurate and complete. Rome Hunt MD.  6:21 AM 10/08/2018                 Clinical Impression:   The primary encounter diagnosis was Acute nonintractable headache, unspecified headache type. Diagnoses of Chest pain and Hypokalemia were also pertinent to this visit.                             Rome Hunt MD  10/08/18 0621

## 2018-10-08 NOTE — ED TRIAGE NOTES
Bhargavi Hitchcock, a 22 y.o. female presents to the ED w/ complaint of pressure h/a, blurry vision, and dizziness for a total of 3x today. Pt denies SOB, n/v/d. Patient identifiers verified and correct for .  Hx of multiple head injuries.    Triage note:  Chief Complaint   Patient presents with    Headache     Pt reports  a few times thonight while at work she had episoid of blurry vision, headache at the top of her head, and dizziness. Pt also reports some nausea with headache.      Review of patient's allergies indicates:  No Known Allergies  Past Medical History:   Diagnosis Date    Anxiety     Depression     PTSD (post-traumatic stress disorder)     Tumor of breast     benign tumor in right breast removed     Patient identifiers verified and correct  LOC: The patient is awake, alert and aware of environment with an appropriate affect, the patient is oriented x 3 and speaking appropriately.   APPEARANCE: Patient appears comfortable and in no acute distress, patient is clean and well groomed.  SKIN: The skin is warm and dry, color consistent with ethnicity, patient has normal skin turgor and moist mucus membranes, skin intact, no breakdown or bruising noted.   MUSCULOSKELETAL: Patient moving all extremities spontaneously, no swelling noted.  RESPIRATORY: Airway is open and patent, respirations are spontaneous, patient has a normal effort and rate, no accessory muscle use noted

## 2018-10-12 ENCOUNTER — OFFICE VISIT (OUTPATIENT)
Dept: OBSTETRICS AND GYNECOLOGY | Facility: CLINIC | Age: 23
End: 2018-10-12
Payer: MEDICAID

## 2018-10-12 VITALS
BODY MASS INDEX: 25.48 KG/M2 | DIASTOLIC BLOOD PRESSURE: 62 MMHG | SYSTOLIC BLOOD PRESSURE: 118 MMHG | WEIGHT: 134.94 LBS | HEIGHT: 61 IN

## 2018-10-12 DIAGNOSIS — N89.8 VAGINAL DISCHARGE: ICD-10-CM

## 2018-10-12 DIAGNOSIS — Z01.411 ENCOUNTER FOR GYNECOLOGICAL EXAMINATION WITH ABNORMAL FINDING: Primary | ICD-10-CM

## 2018-10-12 DIAGNOSIS — Z11.3 VENEREAL DISEASE SCREENING: ICD-10-CM

## 2018-10-12 LAB
C TRACH DNA SPEC QL NAA+PROBE: NOT DETECTED
CANDIDA RRNA VAG QL PROBE: NEGATIVE
G VAGINALIS RRNA GENITAL QL PROBE: NEGATIVE
N GONORRHOEA DNA SPEC QL NAA+PROBE: NOT DETECTED
T VAGINALIS RRNA GENITAL QL PROBE: NEGATIVE

## 2018-10-12 PROCEDURE — 99395 PREV VISIT EST AGE 18-39: CPT | Mod: S$PBB,,, | Performed by: OBSTETRICS & GYNECOLOGY

## 2018-10-12 PROCEDURE — 87491 CHLMYD TRACH DNA AMP PROBE: CPT

## 2018-10-12 PROCEDURE — 87660 TRICHOMONAS VAGIN DIR PROBE: CPT

## 2018-10-12 PROCEDURE — 99999 PR PBB SHADOW E&M-EST. PATIENT-LVL III: CPT | Mod: PBBFAC,,, | Performed by: OBSTETRICS & GYNECOLOGY

## 2018-10-12 PROCEDURE — 99213 OFFICE O/P EST LOW 20 MIN: CPT | Mod: PBBFAC | Performed by: OBSTETRICS & GYNECOLOGY

## 2018-10-12 NOTE — PROGRESS NOTES
Subjective:       Patient ID: Bhargavi Hitchcock is a 22 y.o. female.    Chief Complaint:  Well Woman and Vaginal Discharge      History of Present Illness  HPI    Bhargavi Hitchcock is a 22 y.o. female  NEW TO ME here for her annual GYN exam.  She also complains of a vaginal discharge for several weeks and wants STD screening.  She describes her periods as regular, normal to light flow, lasting 3 days.   denies break through bleeding.   denies vaginal itching or irritation.  Reports vaginal discharge.  She is sexually active. She has 1 partner for the past 6 months .  She uses withdrawal for contraception.   History of abnormal pap: No  Last Pap: approximate date 2017 and was normal  denies domestic violence. She does feel safe at home.     Past Medical History:   Diagnosis Date    Anxiety     Depression     PTSD (post-traumatic stress disorder)     Tumor of breast     benign tumor in right breast removed     Past Surgical History:   Procedure Laterality Date    BREAST SURGERY Right     Tumor removed from right breast-fibroadenoma    NASAL SEPTUM SURGERY      NOSE SURGERY      TONSILLECTOMY       Social History     Socioeconomic History    Marital status: Single     Spouse name: Not on file    Number of children: Not on file    Years of education: Not on file    Highest education level: Not on file   Social Needs    Financial resource strain: Not on file    Food insecurity - worry: Not on file    Food insecurity - inability: Not on file    Transportation needs - medical: Not on file    Transportation needs - non-medical: Not on file   Occupational History    Not on file   Tobacco Use    Smoking status: Light Tobacco Smoker     Packs/day: 0.10     Types: Cigarettes    Smokeless tobacco: Never Used   Substance and Sexual Activity    Alcohol use: Yes     Comment: social    Drug use: Yes     Types: Cocaine    Sexual activity: Yes     Partners: Male     Birth control/protection: Rhythm,  "Coitus interruptus     Comment: current relationship since 2018   Other Topics Concern    Not on file   Social History Narrative    Not on file     Family History   Problem Relation Age of Onset    Hypertension Maternal Grandmother     Breast cancer Neg Hx     Colon cancer Neg Hx     Ovarian cancer Neg Hx     Diabetes Neg Hx     Stroke Neg Hx      OB History      Para Term  AB Living    0 0 0 0 0 0    SAB TAB Ectopic Multiple Live Births    0 0 0 0            /62 (BP Location: Left arm)   Ht 5' 1" (1.549 m)   Wt 61.2 kg (134 lb 14.7 oz)   LMP 10/02/2018 (Exact Date)   BMI 25.49 kg/m²         GYN & OB History  Patient's last menstrual period was 10/02/2018 (exact date).   Date of Last Pap: 2017    OB History    Para Term  AB Living   0 0 0 0 0 0   SAB TAB Ectopic Multiple Live Births   0 0 0 0               Review of Systems  Review of Systems   Constitutional: Negative for activity change, appetite change, fatigue and unexpected weight change.   HENT: Negative.    Eyes: Negative for visual disturbance.   Respiratory: Negative for shortness of breath and wheezing.    Cardiovascular: Negative for chest pain, palpitations and leg swelling.   Gastrointestinal: Negative for abdominal pain, bloating and blood in stool.   Endocrine: Negative for diabetes and hair loss.   Genitourinary: Positive for vaginal discharge. Negative for decreased libido, dyspareunia, menstrual problem and dysmenorrhea.   Musculoskeletal: Negative for back pain and joint swelling.   Skin:  Negative for no acne and hair changes.   Neurological: Negative for headaches.   Hematological: Does not bruise/bleed easily.   Psychiatric/Behavioral: Negative for depression and sleep disturbance. The patient is not nervous/anxious.    Breast: Negative for breast pain and nipple discharge          Objective:      Physical Exam:   Constitutional: She is oriented to person, place, and time. She appears " well-developed and well-nourished.    HENT:   Head: Normocephalic and atraumatic.    Eyes: EOM are normal. Pupils are equal, round, and reactive to light.    Neck: Normal range of motion. Neck supple.    Cardiovascular: Normal rate and regular rhythm.     Pulmonary/Chest: Effort normal and breath sounds normal.        Abdominal: Soft. Bowel sounds are normal.     Genitourinary: Pelvic exam was performed with patient supine.   Genitourinary Comments: PELVIC: Normal external genitalia without lesions.  Normal hair distribution.  Adequate perineal body, normal urethral meatus.  Vagina moist and well rugated without lesions or discharge.  Cervix pink, without lesions, discharge or tenderness.  No significant cystocele or rectocele.  Bimanual exam shows uterus to be normal size, regular, mobile and nontender.  Adnexa without masses or tenderness.               Musculoskeletal: Normal range of motion and moves all extremeties.       Neurological: She is alert and oriented to person, place, and time.    Skin: Skin is warm and dry.    Psychiatric: She has a normal mood and affect.              Assessment:        1. Encounter for gynecological examination with abnormal finding    2. Vaginal discharge    3. Venereal disease screening               Plan:        1. Encounter for gynecological examination with abnormal finding  COUNSELING:  The patient was counseled today on regular weight bearing exercise. The patient was also counseled today on ACS PAP guidelines, with recommendations for yearly pelvic exams unless their uterus, cervix, and ovaries were removed for benign reasons; in that case, examinations every 3-5 years are recommended. The patient was also counseled regarding monthly breast self-examination, routine STD screening for at-risk populations, prophylactic immunizations for transmitted infections such as HPV, Pertussis, or Influenza as appropriate, and yearly mammograms when indicated by ACS guidelines. She was  advised to see her primary care physician for all other health maintenance.   FOLLOW-UP with me for next routine visit.         2. Vaginal discharge    - C. trachomatis/N. gonorrhoeae by AMP DNA  - Vaginosis Screen by DNA Probe    3. Venereal disease screening    - HIV-1 and HIV-2 antibodies; Future  - RPR; Future  - Hepatitis B surface antigen; Future  - Hepatitis C antibody; Future       Follow-up in about 1 year (around 10/12/2019).

## 2019-03-10 ENCOUNTER — HOSPITAL ENCOUNTER (EMERGENCY)
Facility: HOSPITAL | Age: 24
Discharge: HOME | End: 2019-03-10
Attending: EMERGENCY MEDICINE
Payer: MEDICAID

## 2019-03-10 VITALS
OXYGEN SATURATION: 96 % | TEMPERATURE: 99 F | SYSTOLIC BLOOD PRESSURE: 99 MMHG | HEART RATE: 64 BPM | RESPIRATION RATE: 18 BRPM | BODY MASS INDEX: 24.55 KG/M2 | WEIGHT: 130 LBS | DIASTOLIC BLOOD PRESSURE: 63 MMHG | HEIGHT: 61 IN

## 2019-03-10 DIAGNOSIS — J40 BRONCHITIS: Primary | ICD-10-CM

## 2019-03-10 DIAGNOSIS — R05.9 COUGH: ICD-10-CM

## 2019-03-10 LAB
B-HCG UR QL: NEGATIVE
CTP QC/QA: YES

## 2019-03-10 PROCEDURE — 99284 PR EMERGENCY DEPT VISIT,LEVEL IV: ICD-10-PCS | Mod: ,,, | Performed by: EMERGENCY MEDICINE

## 2019-03-10 PROCEDURE — 81025 URINE PREGNANCY TEST: CPT | Performed by: PHYSICIAN ASSISTANT

## 2019-03-10 PROCEDURE — 25000003 PHARM REV CODE 250: Performed by: PHYSICIAN ASSISTANT

## 2019-03-10 PROCEDURE — 99284 EMERGENCY DEPT VISIT MOD MDM: CPT | Mod: ,,, | Performed by: EMERGENCY MEDICINE

## 2019-03-10 PROCEDURE — 99284 EMERGENCY DEPT VISIT MOD MDM: CPT | Mod: 25

## 2019-03-10 RX ORDER — GUAIFENESIN 1200 MG/1
1 TABLET, EXTENDED RELEASE ORAL 2 TIMES DAILY PRN
Qty: 20 TABLET | Refills: 0 | Status: SHIPPED | OUTPATIENT
Start: 2019-03-10 | End: 2019-03-20

## 2019-03-10 RX ORDER — PREDNISONE 20 MG/1
40 TABLET ORAL DAILY
Qty: 10 TABLET | Refills: 0 | Status: SHIPPED | OUTPATIENT
Start: 2019-03-10 | End: 2019-03-15

## 2019-03-10 RX ORDER — PSEUDOEPHEDRINE HCL 30 MG
60 TABLET ORAL
Status: DISCONTINUED | OUTPATIENT
Start: 2019-03-10 | End: 2019-03-10 | Stop reason: HOSPADM

## 2019-03-10 RX ORDER — GUAIFENESIN 600 MG/1
1200 TABLET, EXTENDED RELEASE ORAL
Status: COMPLETED | OUTPATIENT
Start: 2019-03-10 | End: 2019-03-10

## 2019-03-10 RX ORDER — ALBUTEROL SULFATE 90 UG/1
1-2 AEROSOL, METERED RESPIRATORY (INHALATION) EVERY 6 HOURS PRN
Qty: 1 INHALER | Refills: 0 | Status: SHIPPED | OUTPATIENT
Start: 2019-03-10 | End: 2023-09-25 | Stop reason: SDUPTHER

## 2019-03-10 RX ORDER — BENZONATATE 100 MG/1
100 CAPSULE ORAL 3 TIMES DAILY PRN
Qty: 20 CAPSULE | Refills: 0 | Status: SHIPPED | OUTPATIENT
Start: 2019-03-10 | End: 2019-03-20

## 2019-03-10 RX ORDER — BENZONATATE 100 MG/1
200 CAPSULE ORAL
Status: COMPLETED | OUTPATIENT
Start: 2019-03-10 | End: 2019-03-10

## 2019-03-10 RX ORDER — AZITHROMYCIN 250 MG/1
TABLET, FILM COATED ORAL
Qty: 6 TABLET | Refills: 0 | Status: SHIPPED | OUTPATIENT
Start: 2019-03-10 | End: 2019-03-10 | Stop reason: ALTCHOICE

## 2019-03-10 RX ADMIN — GUAIFENESIN 1200 MG: 600 TABLET, EXTENDED RELEASE ORAL at 12:03

## 2019-03-10 RX ADMIN — BENZONATATE 200 MG: 100 CAPSULE ORAL at 12:03

## 2019-03-10 NOTE — ED PROVIDER NOTES
Encounter Date: 3/10/2019    SCRIBE #1 NOTE: I, Nat Etienne, am scribing for, and in the presence of,  Dr. Doyle. I have scribed the following portions of the note - the APC attestation.       History     Chief Complaint   Patient presents with    Cough     nasal congestion.      This is a 23 year old female with no known significant PMH who presents to the ED with a chief complaint of cough. She reports a 1 week history of cough with associated chest tightness and shortness of breath. She reports episodic coughing fits with occasional post tussive emesis. Initially she reports nasal congestion and sinus pressure, which has since improved. She has attempted treatment with Ibuprofen without relief.          Review of patient's allergies indicates:   Allergen Reactions    Ketorolac Shortness Of Breath     Past Medical History:   Diagnosis Date    Anxiety     Depression     PTSD (post-traumatic stress disorder)     Tumor of breast     benign tumor in right breast removed     Past Surgical History:   Procedure Laterality Date    BREAST SURGERY Right 2012    Tumor removed from right breast-fibroadenoma    NASAL SEPTUM SURGERY      NOSE SURGERY      TONSILLECTOMY       Family History   Problem Relation Age of Onset    Hypertension Maternal Grandmother     Breast cancer Neg Hx     Colon cancer Neg Hx     Ovarian cancer Neg Hx     Diabetes Neg Hx     Stroke Neg Hx      Social History     Tobacco Use    Smoking status: Light Tobacco Smoker     Packs/day: 0.10     Types: Cigarettes    Smokeless tobacco: Never Used   Substance Use Topics    Alcohol use: Yes     Comment: social    Drug use: Yes     Types: Cocaine     Review of Systems   Constitutional: Negative for chills and fever.   HENT: Positive for congestion, sinus pressure and sinus pain. Negative for sore throat.    Respiratory: Positive for cough, chest tightness and shortness of breath.    Cardiovascular: Negative for chest pain.    Gastrointestinal: Negative for nausea and vomiting.   Genitourinary: Negative for dysuria.   Musculoskeletal: Negative for back pain.   Skin: Negative for rash.   Neurological: Positive for headaches. Negative for weakness.   Hematological: Does not bruise/bleed easily.       Physical Exam     Initial Vitals [03/10/19 1225]   BP Pulse Resp Temp SpO2   118/74 85 18 98.3 °F (36.8 °C) 97 %      MAP       --         Physical Exam    Constitutional: She appears well-developed and well-nourished. No distress.   HENT:   Head: Atraumatic.   Right Ear: Tympanic membrane and ear canal normal.   Left Ear: Tympanic membrane and ear canal normal.   Nose: Rhinorrhea present.   Mouth/Throat: Uvula is midline, oropharynx is clear and moist and mucous membranes are normal.   Congested nasal mucosa.   Eyes: Conjunctivae and EOM are normal. Pupils are equal, round, and reactive to light.   Neck: Normal range of motion and full passive range of motion without pain. Neck supple.   Cardiovascular: Normal rate, regular rhythm and normal heart sounds.   Pulmonary/Chest: No respiratory distress. She has wheezes in the right upper field. She has no rhonchi. She has no rales.   Abdominal: Soft. Bowel sounds are normal. There is no tenderness. There is no rebound and no guarding.   Neurological: She is alert and oriented to person, place, and time.   Skin: Skin is warm and dry. No rash noted.         ED Course   Procedures  Labs Reviewed   POCT URINE PREGNANCY - Normal          Imaging Results          X-Ray Chest PA And Lateral (Final result)  Result time 03/10/19 12:58:14    Final result by Jurgen Ruth MD (03/10/19 12:58:14)                 Impression:      #1. No significant abnormalities identified.      Electronically signed by: Jurgen Ruth MD  Date:    03/10/2019  Time:    12:58             Narrative:    EXAMINATION:  XR CHEST PA AND LATERAL    CLINICAL HISTORY:  Cough    TECHNIQUE:  PA and lateral views of the chest were  performed.    COMPARISON:  Chest one view 10/08/2018    FINDINGS:  The bilateral lungs are clear.  No pleural effusion or pneumothorax.  The heart and mediastinum are unremarkable. The osseous structures are unremarkable.                                       APC / Resident Notes:   23 year old female presenting with cough and URI symptoms.  On exam she has wheezing in the RUL field. Boggy congested nasal mucosa.     DDx includes but is not limited to bronchitis, viral URI, pneumonia, influenza.    CXR with no focal consolidation.  Counseled patient on supportive care for bronchitis. Will send home with a short course of oral steroids, proair, mucinex and tessalon. PCP f/u in 1-2 weeks if no improvement. Return to ED precautions discussed. She is stable for discharge. I discussed the care of this patient with my supervising MD.        Scribe Attestation:   Scribe #1: I performed the above scribed service and the documentation accurately describes the services I performed. I attest to the accuracy of the note.    Attending Attestation:     Physician Attestation Statement for NP/PA:   I reviewed the chart but I did not personally examine the patient. The face to face encounter was performed by the NP/PA.                     Clinical Impression:       ICD-10-CM ICD-9-CM   1. Bronchitis J40 490   2. Cough R05 786.2         Disposition:   Disposition: Discharged  Condition: Stable                        Johnnie Shi III, MD  03/10/19 1522

## 2019-03-10 NOTE — ED NOTES
LOC: The patient is awake and alert; oriented x 3 and speaking appropriately.  APPEARANCE: Patient is uncomfortable w/ cold s/s. , patient is clean and well groomed  SKIN: warm and dry, normal skin turgor & moist mucus membranes, skin intact, no breakdown noted.  MUSCULOSKELETAL: Patient moving all extremities well, no obvious swelling or deformities noted  RESPIRATORY: Airway is open and patent,respirations are spontaneous, normal effort and rate, frequent cough and nasal and chest congestion.  CARDIAC: Patient has a normal rate, no peripheral edema noted, capillary refill < 3 seconds; No complaints of chest pain   ABDOMEN: Soft and non tender to palpation, no distention noted. Bowel sounds present x 4

## 2019-03-10 NOTE — ED TRIAGE NOTES
C/o chest cold w/ h/a, cough and nasal and chest congestion. Coughed so much 6 days ago that she threw up. Having SOB.

## 2019-03-10 NOTE — ED NOTES
Pt stated she was ready to go home and would take a OTC decongestant. Pt instructed to gargle with warm salt water for sore throat and take medications as prescribed.  VSS and pt in NAD at discharge.

## 2019-04-01 ENCOUNTER — HOSPITAL ENCOUNTER (EMERGENCY)
Facility: OTHER | Age: 24
Discharge: HOME | End: 2019-04-01
Attending: EMERGENCY MEDICINE
Payer: MEDICAID

## 2019-04-01 VITALS
SYSTOLIC BLOOD PRESSURE: 98 MMHG | HEART RATE: 67 BPM | DIASTOLIC BLOOD PRESSURE: 57 MMHG | TEMPERATURE: 99 F | BODY MASS INDEX: 22.84 KG/M2 | RESPIRATION RATE: 18 BRPM | OXYGEN SATURATION: 96 % | WEIGHT: 121 LBS | HEIGHT: 61 IN

## 2019-04-01 DIAGNOSIS — S40.021A TRAUMATIC HEMATOMA OF RIGHT UPPER ARM, INITIAL ENCOUNTER: Primary | ICD-10-CM

## 2019-04-01 PROCEDURE — 99282 EMERGENCY DEPT VISIT SF MDM: CPT

## 2019-04-01 NOTE — ED NOTES
[facility], had routine blood work drawn to R AC, reporting pain and swelling to site. Pt AAOx4 and appropriate at this time. Respirations even and unlabored. No acute distress noted.

## 2021-06-02 ENCOUNTER — LAB VISIT (OUTPATIENT)
Dept: LAB | Facility: OTHER | Age: 26
End: 2021-06-02
Attending: ADVANCED PRACTICE MIDWIFE
Payer: MEDICAID

## 2021-06-02 ENCOUNTER — OFFICE VISIT (OUTPATIENT)
Dept: OBSTETRICS AND GYNECOLOGY | Facility: CLINIC | Age: 26
End: 2021-06-02
Payer: MEDICAID

## 2021-06-02 VITALS
SYSTOLIC BLOOD PRESSURE: 110 MMHG | DIASTOLIC BLOOD PRESSURE: 60 MMHG | WEIGHT: 158.94 LBS | BODY MASS INDEX: 30.03 KG/M2

## 2021-06-02 DIAGNOSIS — Z32.00 POSSIBLE PREGNANCY, NOT CONFIRMED: Primary | ICD-10-CM

## 2021-06-02 DIAGNOSIS — Z32.00 POSSIBLE PREGNANCY, NOT CONFIRMED: ICD-10-CM

## 2021-06-02 LAB
ABO + RH BLD: NORMAL
AMPHET+METHAMPHET UR QL: NEGATIVE
B-HCG UR QL: POSITIVE
BARBITURATES UR QL SCN>200 NG/ML: NEGATIVE
BASOPHILS # BLD AUTO: 0.06 K/UL (ref 0–0.2)
BASOPHILS NFR BLD: 0.9 % (ref 0–1.9)
BENZODIAZ UR QL SCN>200 NG/ML: NEGATIVE
BLD GP AB SCN CELLS X3 SERPL QL: NORMAL
BZE UR QL SCN: NEGATIVE
CANNABINOIDS UR QL SCN: NEGATIVE
CREAT UR-MCNC: 15 MG/DL (ref 15–325)
CTP QC/QA: YES
DIFFERENTIAL METHOD: NORMAL
EOSINOPHIL # BLD AUTO: 0.2 K/UL (ref 0–0.5)
EOSINOPHIL NFR BLD: 2.3 % (ref 0–8)
ERYTHROCYTE [DISTWIDTH] IN BLOOD BY AUTOMATED COUNT: 13.2 % (ref 11.5–14.5)
ETHANOL UR-MCNC: <10 MG/DL
HCT VFR BLD AUTO: 37.7 % (ref 37–48.5)
HGB BLD-MCNC: 12.3 G/DL (ref 12–16)
IMM GRANULOCYTES # BLD AUTO: 0.01 K/UL (ref 0–0.04)
IMM GRANULOCYTES NFR BLD AUTO: 0.1 % (ref 0–0.5)
LYMPHOCYTES # BLD AUTO: 2.4 K/UL (ref 1–4.8)
LYMPHOCYTES NFR BLD: 35.8 % (ref 18–48)
MCH RBC QN AUTO: 28.5 PG (ref 27–31)
MCHC RBC AUTO-ENTMCNC: 32.6 G/DL (ref 32–36)
MCV RBC AUTO: 88 FL (ref 82–98)
METHADONE UR QL SCN>300 NG/ML: NEGATIVE
MONOCYTES # BLD AUTO: 0.5 K/UL (ref 0.3–1)
MONOCYTES NFR BLD: 7.9 % (ref 4–15)
NEUTROPHILS # BLD AUTO: 3.6 K/UL (ref 1.8–7.7)
NEUTROPHILS NFR BLD: 53 % (ref 38–73)
NRBC BLD-RTO: 0 /100 WBC
OPIATES UR QL SCN: NEGATIVE
PCP UR QL SCN>25 NG/ML: NEGATIVE
PLATELET # BLD AUTO: 273 K/UL (ref 150–450)
PMV BLD AUTO: 11 FL (ref 9.2–12.9)
RBC # BLD AUTO: 4.31 M/UL (ref 4–5.4)
RPR SER QL: NORMAL
TOXICOLOGY INFORMATION: NORMAL
WBC # BLD AUTO: 6.81 K/UL (ref 3.9–12.7)

## 2021-06-02 PROCEDURE — 80307 DRUG TEST PRSMV CHEM ANLYZR: CPT | Performed by: ADVANCED PRACTICE MIDWIFE

## 2021-06-02 PROCEDURE — 86803 HEPATITIS C AB TEST: CPT | Performed by: ADVANCED PRACTICE MIDWIFE

## 2021-06-02 PROCEDURE — 86900 BLOOD TYPING SEROLOGIC ABO: CPT | Performed by: ADVANCED PRACTICE MIDWIFE

## 2021-06-02 PROCEDURE — 86762 RUBELLA ANTIBODY: CPT | Performed by: ADVANCED PRACTICE MIDWIFE

## 2021-06-02 PROCEDURE — 87086 URINE CULTURE/COLONY COUNT: CPT | Performed by: ADVANCED PRACTICE MIDWIFE

## 2021-06-02 PROCEDURE — 87491 CHLMYD TRACH DNA AMP PROBE: CPT | Performed by: ADVANCED PRACTICE MIDWIFE

## 2021-06-02 PROCEDURE — 99205 PR OFFICE/OUTPT VISIT, NEW, LEVL V, 60-74 MIN: ICD-10-PCS | Mod: TH,S$PBB,, | Performed by: ADVANCED PRACTICE MIDWIFE

## 2021-06-02 PROCEDURE — 99212 OFFICE O/P EST SF 10 MIN: CPT | Mod: PBBFAC,25 | Performed by: ADVANCED PRACTICE MIDWIFE

## 2021-06-02 PROCEDURE — 99205 OFFICE O/P NEW HI 60 MIN: CPT | Mod: TH,S$PBB,, | Performed by: ADVANCED PRACTICE MIDWIFE

## 2021-06-02 PROCEDURE — 99999 PR PBB SHADOW E&M-EST. PATIENT-LVL II: ICD-10-PCS | Mod: PBBFAC,,, | Performed by: ADVANCED PRACTICE MIDWIFE

## 2021-06-02 PROCEDURE — 99999 PR PBB SHADOW E&M-EST. PATIENT-LVL II: CPT | Mod: PBBFAC,,, | Performed by: ADVANCED PRACTICE MIDWIFE

## 2021-06-02 PROCEDURE — 86592 SYPHILIS TEST NON-TREP QUAL: CPT | Performed by: ADVANCED PRACTICE MIDWIFE

## 2021-06-02 PROCEDURE — 36415 COLL VENOUS BLD VENIPUNCTURE: CPT | Performed by: ADVANCED PRACTICE MIDWIFE

## 2021-06-02 PROCEDURE — 85025 COMPLETE CBC W/AUTO DIFF WBC: CPT | Performed by: ADVANCED PRACTICE MIDWIFE

## 2021-06-02 PROCEDURE — 87591 N.GONORRHOEAE DNA AMP PROB: CPT | Performed by: ADVANCED PRACTICE MIDWIFE

## 2021-06-02 PROCEDURE — 87340 HEPATITIS B SURFACE AG IA: CPT | Performed by: ADVANCED PRACTICE MIDWIFE

## 2021-06-02 PROCEDURE — 86703 HIV-1/HIV-2 1 RESULT ANTBDY: CPT | Performed by: ADVANCED PRACTICE MIDWIFE

## 2021-06-02 RX ORDER — DOCUSATE SODIUM 100 MG/1
100 CAPSULE, LIQUID FILLED ORAL DAILY
COMMUNITY
End: 2021-09-22

## 2021-06-02 RX ORDER — FERROUS SULFATE 324(65)MG
324 TABLET, DELAYED RELEASE (ENTERIC COATED) ORAL DAILY
COMMUNITY
End: 2023-08-09

## 2021-06-02 RX ORDER — OMEGA-3-ACID ETHYL ESTERS 1 G/1
2 CAPSULE, LIQUID FILLED ORAL 2 TIMES DAILY
COMMUNITY
End: 2023-08-09

## 2021-06-03 LAB
C TRACH DNA SPEC QL NAA+PROBE: NOT DETECTED
HBV SURFACE AG SERPL QL IA: NEGATIVE
HCV AB SERPL QL IA: NEGATIVE
HIV 1+2 AB+HIV1 P24 AG SERPL QL IA: NEGATIVE
N GONORRHOEA DNA SPEC QL NAA+PROBE: NOT DETECTED
RUBV IGG SER-ACNC: 29.2 IU/ML
RUBV IGG SER-IMP: REACTIVE

## 2021-06-04 LAB — BACTERIA UR CULT: NO GROWTH

## 2021-06-08 ENCOUNTER — NURSE TRIAGE (OUTPATIENT)
Dept: ADMINISTRATIVE | Facility: CLINIC | Age: 26
End: 2021-06-08

## 2021-06-08 ENCOUNTER — PATIENT MESSAGE (OUTPATIENT)
Dept: OBSTETRICS AND GYNECOLOGY | Facility: CLINIC | Age: 26
End: 2021-06-08

## 2021-06-14 ENCOUNTER — PROCEDURE VISIT (OUTPATIENT)
Dept: OBSTETRICS AND GYNECOLOGY | Facility: CLINIC | Age: 26
End: 2021-06-14
Payer: MEDICAID

## 2021-06-14 DIAGNOSIS — Z32.00 POSSIBLE PREGNANCY, NOT CONFIRMED: ICD-10-CM

## 2021-06-14 DIAGNOSIS — Z36.89 ESTABLISH GESTATIONAL AGE, ULTRASOUND: ICD-10-CM

## 2021-06-14 PROCEDURE — 76801 OB US < 14 WKS SINGLE FETUS: CPT | Mod: PBBFAC | Performed by: OBSTETRICS & GYNECOLOGY

## 2021-06-14 PROCEDURE — 76801 OB US < 14 WKS SINGLE FETUS: CPT | Mod: 26,S$PBB,, | Performed by: OBSTETRICS & GYNECOLOGY

## 2021-06-14 PROCEDURE — 76801 PR US, OB <14WKS, TRANSABD, SINGLE GESTATION: ICD-10-PCS | Mod: 26,S$PBB,, | Performed by: OBSTETRICS & GYNECOLOGY

## 2021-06-30 ENCOUNTER — PATIENT MESSAGE (OUTPATIENT)
Dept: OBSTETRICS AND GYNECOLOGY | Facility: CLINIC | Age: 26
End: 2021-06-30

## 2021-06-30 ENCOUNTER — LAB VISIT (OUTPATIENT)
Dept: LAB | Facility: OTHER | Age: 26
End: 2021-06-30
Payer: MEDICAID

## 2021-06-30 ENCOUNTER — PATIENT MESSAGE (OUTPATIENT)
Dept: ADMINISTRATIVE | Facility: OTHER | Age: 26
End: 2021-06-30

## 2021-06-30 ENCOUNTER — INITIAL PRENATAL (OUTPATIENT)
Dept: OBSTETRICS AND GYNECOLOGY | Facility: CLINIC | Age: 26
End: 2021-06-30
Payer: MEDICAID

## 2021-06-30 VITALS — SYSTOLIC BLOOD PRESSURE: 110 MMHG | BODY MASS INDEX: 30.7 KG/M2 | DIASTOLIC BLOOD PRESSURE: 62 MMHG | WEIGHT: 162.5 LBS

## 2021-06-30 DIAGNOSIS — F11.11 HISTORY OF OPIOID ABUSE: ICD-10-CM

## 2021-06-30 DIAGNOSIS — Z34.91 PRENATAL CARE IN FIRST TRIMESTER: Primary | ICD-10-CM

## 2021-06-30 DIAGNOSIS — Z13.71 SCREENING FOR GENETIC DISEASE CARRIER STATUS: ICD-10-CM

## 2021-06-30 DIAGNOSIS — Z34.91 PRENATAL CARE IN FIRST TRIMESTER: ICD-10-CM

## 2021-06-30 DIAGNOSIS — Z86.59 HISTORY OF BIPOLAR DISORDER: ICD-10-CM

## 2021-06-30 DIAGNOSIS — E55.9 VITAMIN D DEFICIENCY: ICD-10-CM

## 2021-06-30 DIAGNOSIS — Z34.90 PREGNANCY, UNSPECIFIED GESTATIONAL AGE: ICD-10-CM

## 2021-06-30 DIAGNOSIS — Z13.9 RISK AND FUNCTIONAL ASSESSMENT: ICD-10-CM

## 2021-06-30 LAB — 25(OH)D3+25(OH)D2 SERPL-MCNC: 31 NG/ML (ref 30–96)

## 2021-06-30 PROCEDURE — 99213 PR OFFICE/OUTPT VISIT, EST, LEVL III, 20-29 MIN: ICD-10-PCS | Mod: S$PBB,TH,, | Performed by: ADVANCED PRACTICE MIDWIFE

## 2021-06-30 PROCEDURE — 36415 COLL VENOUS BLD VENIPUNCTURE: CPT | Performed by: ADVANCED PRACTICE MIDWIFE

## 2021-06-30 PROCEDURE — 82306 VITAMIN D 25 HYDROXY: CPT | Performed by: ADVANCED PRACTICE MIDWIFE

## 2021-06-30 PROCEDURE — 99999 PR PBB SHADOW E&M-EST. PATIENT-LVL III: ICD-10-PCS | Mod: PBBFAC,,, | Performed by: ADVANCED PRACTICE MIDWIFE

## 2021-06-30 PROCEDURE — 83021 HEMOGLOBIN CHROMOTOGRAPHY: CPT | Performed by: ADVANCED PRACTICE MIDWIFE

## 2021-06-30 PROCEDURE — 99213 OFFICE O/P EST LOW 20 MIN: CPT | Mod: S$PBB,TH,, | Performed by: ADVANCED PRACTICE MIDWIFE

## 2021-06-30 PROCEDURE — 81329 SMN1 GENE DOS/DELETION ALYS: CPT | Performed by: ADVANCED PRACTICE MIDWIFE

## 2021-06-30 PROCEDURE — 99213 OFFICE O/P EST LOW 20 MIN: CPT | Mod: PBBFAC,TH | Performed by: ADVANCED PRACTICE MIDWIFE

## 2021-06-30 PROCEDURE — 99999 PR PBB SHADOW E&M-EST. PATIENT-LVL III: CPT | Mod: PBBFAC,,, | Performed by: ADVANCED PRACTICE MIDWIFE

## 2021-07-01 ENCOUNTER — DOCUMENTATION ONLY (OUTPATIENT)
Dept: OBSTETRICS AND GYNECOLOGY | Facility: CLINIC | Age: 26
End: 2021-07-01

## 2021-07-01 LAB
HGB A2 MFR BLD HPLC: 2.4 % (ref 2.2–3.2)
HGB FRACT BLD ELPH-IMP: NORMAL
HGB FRACT BLD ELPH-IMP: NORMAL

## 2021-07-06 ENCOUNTER — TELEPHONE (OUTPATIENT)
Dept: OBSTETRICS AND GYNECOLOGY | Facility: CLINIC | Age: 26
End: 2021-07-06

## 2021-07-06 ENCOUNTER — PATIENT MESSAGE (OUTPATIENT)
Dept: ADMINISTRATIVE | Facility: OTHER | Age: 26
End: 2021-07-06

## 2021-07-07 LAB
ANNOTATION COMMENT IMP: NORMAL
GENETICIST REVIEW: NORMAL
MOL DX INTERP BLD/T QL: NORMAL
PROVIDER SIGNING NAME: NORMAL
SMNCS RESULT: NORMAL
SPECIMEN SOURCE: NORMAL
SPECIMEN SOURCE: NORMAL

## 2021-07-13 ENCOUNTER — PATIENT MESSAGE (OUTPATIENT)
Dept: ADMINISTRATIVE | Facility: OTHER | Age: 26
End: 2021-07-13

## 2021-07-28 ENCOUNTER — ROUTINE PRENATAL (OUTPATIENT)
Dept: OBSTETRICS AND GYNECOLOGY | Facility: CLINIC | Age: 26
End: 2021-07-28
Payer: MEDICAID

## 2021-07-28 ENCOUNTER — PATIENT MESSAGE (OUTPATIENT)
Dept: OBSTETRICS AND GYNECOLOGY | Facility: CLINIC | Age: 26
End: 2021-07-28

## 2021-07-28 ENCOUNTER — LAB VISIT (OUTPATIENT)
Dept: LAB | Facility: OTHER | Age: 26
End: 2021-07-28
Payer: MEDICAID

## 2021-07-28 VITALS — BODY MASS INDEX: 30.89 KG/M2 | SYSTOLIC BLOOD PRESSURE: 120 MMHG | DIASTOLIC BLOOD PRESSURE: 62 MMHG | WEIGHT: 163.5 LBS

## 2021-07-28 DIAGNOSIS — Z34.90 PREGNANCY, UNSPECIFIED GESTATIONAL AGE: Primary | ICD-10-CM

## 2021-07-28 DIAGNOSIS — Z34.90 PREGNANCY, UNSPECIFIED GESTATIONAL AGE: ICD-10-CM

## 2021-07-28 PROCEDURE — 99999 PR PBB SHADOW E&M-EST. PATIENT-LVL II: CPT | Mod: PBBFAC,,, | Performed by: MIDWIFE

## 2021-07-28 PROCEDURE — 36415 COLL VENOUS BLD VENIPUNCTURE: CPT | Performed by: MIDWIFE

## 2021-07-28 PROCEDURE — 99212 PR OFFICE/OUTPT VISIT, EST, LEVL II, 10-19 MIN: ICD-10-PCS | Mod: TH,S$PBB,, | Performed by: MIDWIFE

## 2021-07-28 PROCEDURE — 82105 ALPHA-FETOPROTEIN SERUM: CPT | Performed by: MIDWIFE

## 2021-07-28 PROCEDURE — 99999 PR PBB SHADOW E&M-EST. PATIENT-LVL II: ICD-10-PCS | Mod: PBBFAC,,, | Performed by: MIDWIFE

## 2021-07-28 PROCEDURE — 99212 OFFICE O/P EST SF 10 MIN: CPT | Mod: PBBFAC,TH | Performed by: MIDWIFE

## 2021-07-28 PROCEDURE — 99212 OFFICE O/P EST SF 10 MIN: CPT | Mod: TH,S$PBB,, | Performed by: MIDWIFE

## 2021-08-01 ENCOUNTER — PATIENT MESSAGE (OUTPATIENT)
Dept: OBSTETRICS AND GYNECOLOGY | Facility: CLINIC | Age: 26
End: 2021-08-01

## 2021-08-02 LAB
# FETUSES US: ABNORMAL
AFP INTERPRETATION: ABNORMAL
AFP MOM SERPL: ABNORMAL
AFP SERPL-MCNC: 18.9 NG/ML
AFP SERPL-MCNC: ABNORMAL NG/ML
AGE AT DELIVERY: 26
GA (DAYS): 2 D
GA (WEEKS): 14 WK
GESTATIONAL AGE METHOD: ABNORMAL
IDDM PATIENT QL: ABNORMAL
SMOKING STATUS FTND: NO

## 2021-08-05 DIAGNOSIS — Z34.90 PREGNANCY, UNSPECIFIED GESTATIONAL AGE: Primary | ICD-10-CM

## 2021-08-25 ENCOUNTER — PROCEDURE VISIT (OUTPATIENT)
Dept: MATERNAL FETAL MEDICINE | Facility: CLINIC | Age: 26
End: 2021-08-25
Payer: MEDICAID

## 2021-08-25 DIAGNOSIS — Z36.89 ENCOUNTER FOR FETAL ANATOMIC SURVEY: ICD-10-CM

## 2021-08-25 DIAGNOSIS — Z32.00 POSSIBLE PREGNANCY, NOT CONFIRMED: ICD-10-CM

## 2021-08-25 PROCEDURE — 76805 OB US >/= 14 WKS SNGL FETUS: CPT | Mod: 26,S$PBB,, | Performed by: OBSTETRICS & GYNECOLOGY

## 2021-08-25 PROCEDURE — 76805 PR US, OB 14+WKS, TRANSABD, SINGLE GESTATION: ICD-10-PCS | Mod: 26,S$PBB,, | Performed by: OBSTETRICS & GYNECOLOGY

## 2021-08-25 PROCEDURE — 76805 OB US >/= 14 WKS SNGL FETUS: CPT | Mod: PBBFAC | Performed by: OBSTETRICS & GYNECOLOGY

## 2021-09-22 ENCOUNTER — ROUTINE PRENATAL (OUTPATIENT)
Dept: OBSTETRICS AND GYNECOLOGY | Facility: CLINIC | Age: 26
End: 2021-09-22
Payer: COMMERCIAL

## 2021-09-22 VITALS
DIASTOLIC BLOOD PRESSURE: 68 MMHG | BODY MASS INDEX: 34.53 KG/M2 | SYSTOLIC BLOOD PRESSURE: 118 MMHG | WEIGHT: 182.75 LBS

## 2021-09-22 DIAGNOSIS — Z86.59 HISTORY OF BIPOLAR DISORDER: ICD-10-CM

## 2021-09-22 DIAGNOSIS — Z34.90 PREGNANCY, UNSPECIFIED GESTATIONAL AGE: Primary | ICD-10-CM

## 2021-09-22 PROCEDURE — 99999 PR PBB SHADOW E&M-EST. PATIENT-LVL II: ICD-10-PCS | Mod: PBBFAC,,, | Performed by: MIDWIFE

## 2021-09-22 PROCEDURE — 0502F PR SUBSEQUENT PRENATAL CARE: ICD-10-PCS | Mod: S$PBB,,, | Performed by: MIDWIFE

## 2021-09-22 PROCEDURE — 0502F SUBSEQUENT PRENATAL CARE: CPT | Mod: S$PBB,,, | Performed by: MIDWIFE

## 2021-09-22 PROCEDURE — 99999 PR PBB SHADOW E&M-EST. PATIENT-LVL II: CPT | Mod: PBBFAC,,, | Performed by: MIDWIFE

## 2021-09-22 RX ORDER — MAGNESIUM 30 MG
TABLET ORAL ONCE
COMMUNITY
End: 2023-08-09

## 2021-10-04 PROBLEM — Z13.9 RISK AND FUNCTIONAL ASSESSMENT: Status: RESOLVED | Noted: 2021-06-30 | Resolved: 2021-10-04

## 2021-10-05 ENCOUNTER — PATIENT MESSAGE (OUTPATIENT)
Dept: ADMINISTRATIVE | Facility: OTHER | Age: 26
End: 2021-10-05

## 2021-10-20 ENCOUNTER — ROUTINE PRENATAL (OUTPATIENT)
Dept: OBSTETRICS AND GYNECOLOGY | Facility: CLINIC | Age: 26
End: 2021-10-20
Payer: COMMERCIAL

## 2021-10-20 ENCOUNTER — LAB VISIT (OUTPATIENT)
Dept: LAB | Facility: OTHER | Age: 26
End: 2021-10-20
Attending: MIDWIFE
Payer: MEDICAID

## 2021-10-20 VITALS
WEIGHT: 191.94 LBS | SYSTOLIC BLOOD PRESSURE: 106 MMHG | DIASTOLIC BLOOD PRESSURE: 60 MMHG | BODY MASS INDEX: 36.26 KG/M2

## 2021-10-20 DIAGNOSIS — Z34.90 PREGNANCY, UNSPECIFIED GESTATIONAL AGE: ICD-10-CM

## 2021-10-20 DIAGNOSIS — Z34.92 PRENATAL CARE IN SECOND TRIMESTER: Primary | ICD-10-CM

## 2021-10-20 LAB — GLUCOSE SERPL-MCNC: 80 MG/DL (ref 70–140)

## 2021-10-20 PROCEDURE — 82950 GLUCOSE TEST: CPT | Performed by: MIDWIFE

## 2021-10-20 PROCEDURE — 99999 PR PBB SHADOW E&M-EST. PATIENT-LVL II: CPT | Mod: PBBFAC,,, | Performed by: ADVANCED PRACTICE MIDWIFE

## 2021-10-20 PROCEDURE — 0502F PR SUBSEQUENT PRENATAL CARE: ICD-10-PCS | Mod: S$GLB,,, | Performed by: ADVANCED PRACTICE MIDWIFE

## 2021-10-20 PROCEDURE — 36415 COLL VENOUS BLD VENIPUNCTURE: CPT | Performed by: MIDWIFE

## 2021-10-20 PROCEDURE — 99999 PR PBB SHADOW E&M-EST. PATIENT-LVL II: ICD-10-PCS | Mod: PBBFAC,,, | Performed by: ADVANCED PRACTICE MIDWIFE

## 2021-10-20 PROCEDURE — 99212 OFFICE O/P EST SF 10 MIN: CPT | Mod: PBBFAC | Performed by: ADVANCED PRACTICE MIDWIFE

## 2021-10-20 PROCEDURE — 0502F SUBSEQUENT PRENATAL CARE: CPT | Mod: S$GLB,,, | Performed by: ADVANCED PRACTICE MIDWIFE

## 2021-10-20 RX ORDER — CETIRIZINE HYDROCHLORIDE 10 MG/1
10 TABLET ORAL DAILY PRN
COMMUNITY
Start: 2021-08-25

## 2021-10-21 ENCOUNTER — PATIENT MESSAGE (OUTPATIENT)
Dept: OBSTETRICS AND GYNECOLOGY | Facility: CLINIC | Age: 26
End: 2021-10-21

## 2021-10-21 DIAGNOSIS — M54.31 SCIATIC PAIN, RIGHT: Primary | ICD-10-CM

## 2021-10-26 ENCOUNTER — PATIENT MESSAGE (OUTPATIENT)
Dept: ADMINISTRATIVE | Facility: OTHER | Age: 26
End: 2021-10-26
Payer: MEDICAID

## 2021-11-10 ENCOUNTER — CLINICAL SUPPORT (OUTPATIENT)
Dept: OBSTETRICS AND GYNECOLOGY | Facility: CLINIC | Age: 26
End: 2021-11-10
Payer: COMMERCIAL

## 2021-11-10 ENCOUNTER — ROUTINE PRENATAL (OUTPATIENT)
Dept: OBSTETRICS AND GYNECOLOGY | Facility: CLINIC | Age: 26
End: 2021-11-10
Payer: COMMERCIAL

## 2021-11-10 VITALS — WEIGHT: 194.25 LBS | BODY MASS INDEX: 36.7 KG/M2 | DIASTOLIC BLOOD PRESSURE: 64 MMHG | SYSTOLIC BLOOD PRESSURE: 110 MMHG

## 2021-11-10 DIAGNOSIS — Z34.03 ENCOUNTER FOR SUPERVISION OF NORMAL FIRST PREGNANCY IN THIRD TRIMESTER: Primary | ICD-10-CM

## 2021-11-10 DIAGNOSIS — Z34.92 PRENATAL CARE IN SECOND TRIMESTER: ICD-10-CM

## 2021-11-10 PROCEDURE — 90471 IMMUNIZATION ADMIN: CPT | Mod: PBBFAC

## 2021-11-10 PROCEDURE — 99212 OFFICE O/P EST SF 10 MIN: CPT | Mod: PBBFAC,TH | Performed by: ADVANCED PRACTICE MIDWIFE

## 2021-11-10 PROCEDURE — 0502F PR SUBSEQUENT PRENATAL CARE: ICD-10-PCS | Mod: S$GLB,,, | Performed by: ADVANCED PRACTICE MIDWIFE

## 2021-11-10 PROCEDURE — 99999 PR PBB SHADOW E&M-EST. PATIENT-LVL II: ICD-10-PCS | Mod: PBBFAC,,, | Performed by: ADVANCED PRACTICE MIDWIFE

## 2021-11-10 PROCEDURE — 99999 PR PBB SHADOW E&M-EST. PATIENT-LVL II: CPT | Mod: PBBFAC,,, | Performed by: ADVANCED PRACTICE MIDWIFE

## 2021-11-10 PROCEDURE — 0502F SUBSEQUENT PRENATAL CARE: CPT | Mod: S$GLB,,, | Performed by: ADVANCED PRACTICE MIDWIFE

## 2021-11-12 ENCOUNTER — PATIENT MESSAGE (OUTPATIENT)
Dept: OBSTETRICS AND GYNECOLOGY | Facility: CLINIC | Age: 26
End: 2021-11-12
Payer: COMMERCIAL

## 2021-11-22 ENCOUNTER — PATIENT MESSAGE (OUTPATIENT)
Dept: REHABILITATION | Facility: OTHER | Age: 26
End: 2021-11-22

## 2021-11-22 ENCOUNTER — CLINICAL SUPPORT (OUTPATIENT)
Dept: REHABILITATION | Facility: OTHER | Age: 26
End: 2021-11-22
Payer: COMMERCIAL

## 2021-11-22 DIAGNOSIS — M54.31 SCIATIC PAIN, RIGHT: ICD-10-CM

## 2021-11-22 DIAGNOSIS — R29.898 WEAKNESS OF BOTH HIPS: ICD-10-CM

## 2021-11-22 DIAGNOSIS — R29.3 POSTURE ABNORMALITY: ICD-10-CM

## 2021-11-22 PROCEDURE — 97161 PT EVAL LOW COMPLEX 20 MIN: CPT

## 2021-11-22 PROCEDURE — 97110 THERAPEUTIC EXERCISES: CPT

## 2021-11-22 PROCEDURE — 97530 THERAPEUTIC ACTIVITIES: CPT

## 2021-11-23 ENCOUNTER — PATIENT MESSAGE (OUTPATIENT)
Dept: ADMINISTRATIVE | Facility: OTHER | Age: 26
End: 2021-11-23
Payer: COMMERCIAL

## 2021-11-24 ENCOUNTER — ROUTINE PRENATAL (OUTPATIENT)
Dept: OBSTETRICS AND GYNECOLOGY | Facility: CLINIC | Age: 26
End: 2021-11-24
Payer: COMMERCIAL

## 2021-11-24 ENCOUNTER — PATIENT MESSAGE (OUTPATIENT)
Dept: OBSTETRICS AND GYNECOLOGY | Facility: CLINIC | Age: 26
End: 2021-11-24

## 2021-11-24 VITALS
DIASTOLIC BLOOD PRESSURE: 62 MMHG | SYSTOLIC BLOOD PRESSURE: 108 MMHG | BODY MASS INDEX: 37.72 KG/M2 | WEIGHT: 199.63 LBS

## 2021-11-24 DIAGNOSIS — Z34.93 PRENATAL CARE IN THIRD TRIMESTER: Primary | ICD-10-CM

## 2021-11-24 DIAGNOSIS — Z3A.32 32 WEEKS GESTATION OF PREGNANCY: ICD-10-CM

## 2021-11-24 PROCEDURE — 0502F SUBSEQUENT PRENATAL CARE: CPT | Mod: CPTII,S$GLB,, | Performed by: ADVANCED PRACTICE MIDWIFE

## 2021-11-24 PROCEDURE — 99999 PR PBB SHADOW E&M-EST. PATIENT-LVL II: CPT | Mod: PBBFAC,,, | Performed by: ADVANCED PRACTICE MIDWIFE

## 2021-11-24 PROCEDURE — 0502F PR SUBSEQUENT PRENATAL CARE: ICD-10-PCS | Mod: CPTII,S$GLB,, | Performed by: ADVANCED PRACTICE MIDWIFE

## 2021-11-24 PROCEDURE — 99999 PR PBB SHADOW E&M-EST. PATIENT-LVL II: ICD-10-PCS | Mod: PBBFAC,,, | Performed by: ADVANCED PRACTICE MIDWIFE

## 2021-12-01 ENCOUNTER — CLINICAL SUPPORT (OUTPATIENT)
Dept: REHABILITATION | Facility: OTHER | Age: 26
End: 2021-12-01
Attending: MIDWIFE
Payer: COMMERCIAL

## 2021-12-01 DIAGNOSIS — R29.898 WEAKNESS OF BOTH HIPS: ICD-10-CM

## 2021-12-01 DIAGNOSIS — R29.3 POSTURE ABNORMALITY: Primary | ICD-10-CM

## 2021-12-01 PROCEDURE — 97110 THERAPEUTIC EXERCISES: CPT | Mod: CQ

## 2021-12-01 PROCEDURE — 97140 MANUAL THERAPY 1/> REGIONS: CPT | Mod: CQ

## 2021-12-02 ENCOUNTER — TELEPHONE (OUTPATIENT)
Dept: OBSTETRICS AND GYNECOLOGY | Facility: CLINIC | Age: 26
End: 2021-12-02
Payer: COMMERCIAL

## 2021-12-02 ENCOUNTER — HOSPITAL ENCOUNTER (EMERGENCY)
Facility: OTHER | Age: 26
Discharge: HOME | End: 2021-12-02
Attending: OBSTETRICS & GYNECOLOGY
Payer: COMMERCIAL

## 2021-12-02 VITALS
DIASTOLIC BLOOD PRESSURE: 63 MMHG | HEART RATE: 76 BPM | RESPIRATION RATE: 18 BRPM | OXYGEN SATURATION: 98 % | TEMPERATURE: 98 F | SYSTOLIC BLOOD PRESSURE: 105 MMHG

## 2021-12-02 DIAGNOSIS — Z3A.33 33 WEEKS GESTATION OF PREGNANCY: ICD-10-CM

## 2021-12-02 DIAGNOSIS — N89.8 VAGINAL DISCHARGE: Primary | ICD-10-CM

## 2021-12-02 DIAGNOSIS — O47.9 IRREGULAR UTERINE CONTRACTIONS: ICD-10-CM

## 2021-12-02 PROCEDURE — 59025 FETAL NON-STRESS TEST: CPT

## 2021-12-02 PROCEDURE — 59025 PR FETAL 2N-STRESS TEST: ICD-10-PCS | Mod: 26,,, | Performed by: OBSTETRICS & GYNECOLOGY

## 2021-12-02 PROCEDURE — 99283 PR EMERGENCY DEPT VISIT,LEVEL III: ICD-10-PCS | Mod: 25,,, | Performed by: OBSTETRICS & GYNECOLOGY

## 2021-12-02 PROCEDURE — 99284 EMERGENCY DEPT VISIT MOD MDM: CPT | Mod: 25

## 2021-12-02 PROCEDURE — 59025 FETAL NON-STRESS TEST: CPT | Mod: 26,,, | Performed by: OBSTETRICS & GYNECOLOGY

## 2021-12-02 PROCEDURE — 99283 EMERGENCY DEPT VISIT LOW MDM: CPT | Mod: 25,,, | Performed by: OBSTETRICS & GYNECOLOGY

## 2021-12-08 ENCOUNTER — ROUTINE PRENATAL (OUTPATIENT)
Dept: OBSTETRICS AND GYNECOLOGY | Facility: CLINIC | Age: 26
End: 2021-12-08
Payer: COMMERCIAL

## 2021-12-08 VITALS
DIASTOLIC BLOOD PRESSURE: 68 MMHG | BODY MASS INDEX: 37.82 KG/M2 | WEIGHT: 200.19 LBS | SYSTOLIC BLOOD PRESSURE: 108 MMHG

## 2021-12-08 DIAGNOSIS — Z3A.34 34 WEEKS GESTATION OF PREGNANCY: Primary | ICD-10-CM

## 2021-12-08 PROCEDURE — 0502F SUBSEQUENT PRENATAL CARE: CPT | Mod: CPTII,S$GLB,, | Performed by: MIDWIFE

## 2021-12-08 PROCEDURE — 99999 PR PBB SHADOW E&M-EST. PATIENT-LVL II: ICD-10-PCS | Mod: PBBFAC,,, | Performed by: MIDWIFE

## 2021-12-08 PROCEDURE — 99999 PR PBB SHADOW E&M-EST. PATIENT-LVL II: CPT | Mod: PBBFAC,,, | Performed by: MIDWIFE

## 2021-12-08 PROCEDURE — 0502F PR SUBSEQUENT PRENATAL CARE: ICD-10-PCS | Mod: CPTII,S$GLB,, | Performed by: MIDWIFE

## 2021-12-16 ENCOUNTER — PATIENT MESSAGE (OUTPATIENT)
Dept: OBSTETRICS AND GYNECOLOGY | Facility: CLINIC | Age: 26
End: 2021-12-16
Payer: COMMERCIAL

## 2021-12-22 ENCOUNTER — ROUTINE PRENATAL (OUTPATIENT)
Dept: OBSTETRICS AND GYNECOLOGY | Facility: CLINIC | Age: 26
End: 2021-12-22
Payer: COMMERCIAL

## 2021-12-22 VITALS
DIASTOLIC BLOOD PRESSURE: 70 MMHG | SYSTOLIC BLOOD PRESSURE: 110 MMHG | BODY MASS INDEX: 38.41 KG/M2 | WEIGHT: 203.25 LBS

## 2021-12-22 DIAGNOSIS — R10.2 PELVIC PAIN IN PREGNANCY: ICD-10-CM

## 2021-12-22 DIAGNOSIS — O26.899 PELVIC PAIN IN PREGNANCY: ICD-10-CM

## 2021-12-22 DIAGNOSIS — Z34.93 PRENATAL CARE IN THIRD TRIMESTER: Primary | ICD-10-CM

## 2021-12-22 PROCEDURE — 87081 CULTURE SCREEN ONLY: CPT | Performed by: ADVANCED PRACTICE MIDWIFE

## 2021-12-22 PROCEDURE — 99999 PR PBB SHADOW E&M-EST. PATIENT-LVL II: CPT | Mod: PBBFAC,,, | Performed by: ADVANCED PRACTICE MIDWIFE

## 2021-12-22 PROCEDURE — 0502F SUBSEQUENT PRENATAL CARE: CPT | Mod: CPTII,S$GLB,, | Performed by: ADVANCED PRACTICE MIDWIFE

## 2021-12-22 PROCEDURE — 99999 PR PBB SHADOW E&M-EST. PATIENT-LVL II: ICD-10-PCS | Mod: PBBFAC,,, | Performed by: ADVANCED PRACTICE MIDWIFE

## 2021-12-22 PROCEDURE — 0502F PR SUBSEQUENT PRENATAL CARE: ICD-10-PCS | Mod: CPTII,S$GLB,, | Performed by: ADVANCED PRACTICE MIDWIFE

## 2021-12-22 RX ORDER — CYCLOBENZAPRINE HCL 5 MG
5 TABLET ORAL 3 TIMES DAILY PRN
Qty: 20 TABLET | Refills: 0 | Status: SHIPPED | OUTPATIENT
Start: 2021-12-22 | End: 2022-01-01

## 2021-12-23 ENCOUNTER — PATIENT MESSAGE (OUTPATIENT)
Dept: REHABILITATION | Facility: OTHER | Age: 26
End: 2021-12-23
Payer: COMMERCIAL

## 2021-12-23 ENCOUNTER — PATIENT MESSAGE (OUTPATIENT)
Dept: OBSTETRICS AND GYNECOLOGY | Facility: CLINIC | Age: 26
End: 2021-12-23
Payer: COMMERCIAL

## 2021-12-23 DIAGNOSIS — O98.513 COVID-19 AFFECTING PREGNANCY IN THIRD TRIMESTER: Primary | ICD-10-CM

## 2021-12-23 DIAGNOSIS — U07.1 COVID-19 AFFECTING PREGNANCY IN THIRD TRIMESTER: Primary | ICD-10-CM

## 2021-12-26 ENCOUNTER — PATIENT MESSAGE (OUTPATIENT)
Dept: ADMINISTRATIVE | Facility: OTHER | Age: 26
End: 2021-12-26
Payer: COMMERCIAL

## 2021-12-26 ENCOUNTER — TELEPHONE (OUTPATIENT)
Dept: OBSTETRICS AND GYNECOLOGY | Facility: HOSPITAL | Age: 26
End: 2021-12-26
Payer: COMMERCIAL

## 2021-12-26 ENCOUNTER — PATIENT MESSAGE (OUTPATIENT)
Dept: OBSTETRICS AND GYNECOLOGY | Facility: HOSPITAL | Age: 26
End: 2021-12-26
Payer: COMMERCIAL

## 2021-12-26 PROBLEM — U07.1 COVID-19 AFFECTING PREGNANCY, ANTEPARTUM: Status: ACTIVE | Noted: 2021-12-26

## 2021-12-26 PROBLEM — O98.519 COVID-19 AFFECTING PREGNANCY, ANTEPARTUM: Status: ACTIVE | Noted: 2021-12-26

## 2021-12-27 ENCOUNTER — PATIENT MESSAGE (OUTPATIENT)
Dept: OBSTETRICS AND GYNECOLOGY | Facility: CLINIC | Age: 26
End: 2021-12-27
Payer: COMMERCIAL

## 2021-12-27 LAB — BACTERIA SPEC AEROBE CULT: NORMAL

## 2021-12-29 ENCOUNTER — OFFICE VISIT (OUTPATIENT)
Dept: OBSTETRICS AND GYNECOLOGY | Facility: CLINIC | Age: 26
End: 2021-12-29
Payer: COMMERCIAL

## 2021-12-29 DIAGNOSIS — Z34.03 ENCOUNTER FOR SUPERVISION OF NORMAL FIRST PREGNANCY IN THIRD TRIMESTER: Primary | ICD-10-CM

## 2021-12-29 PROCEDURE — 0502F SUBSEQUENT PRENATAL CARE: CPT | Mod: 95,,, | Performed by: ADVANCED PRACTICE MIDWIFE

## 2021-12-29 PROCEDURE — 0502F PR SUBSEQUENT PRENATAL CARE: ICD-10-PCS | Mod: 95,,, | Performed by: ADVANCED PRACTICE MIDWIFE
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
## 2021-12-31 ENCOUNTER — PATIENT MESSAGE (OUTPATIENT)
Dept: OBSTETRICS AND GYNECOLOGY | Facility: CLINIC | Age: 26
End: 2021-12-31
Payer: COMMERCIAL

## 2022-01-05 ENCOUNTER — ROUTINE PRENATAL (OUTPATIENT)
Dept: OBSTETRICS AND GYNECOLOGY | Facility: CLINIC | Age: 27
End: 2022-01-05
Payer: COMMERCIAL

## 2022-01-05 VITALS
DIASTOLIC BLOOD PRESSURE: 70 MMHG | SYSTOLIC BLOOD PRESSURE: 110 MMHG | BODY MASS INDEX: 39.05 KG/M2 | WEIGHT: 206.69 LBS

## 2022-01-05 DIAGNOSIS — R10.2 PELVIC PAIN IN PREGNANCY: ICD-10-CM

## 2022-01-05 DIAGNOSIS — O26.899 PELVIC PAIN IN PREGNANCY: ICD-10-CM

## 2022-01-05 DIAGNOSIS — Z34.90 PREGNANCY WITH ONE FETUS, ANTEPARTUM: Primary | ICD-10-CM

## 2022-01-05 PROCEDURE — 0502F PR SUBSEQUENT PRENATAL CARE: ICD-10-PCS | Mod: CPTII,S$GLB,, | Performed by: ADVANCED PRACTICE MIDWIFE

## 2022-01-05 PROCEDURE — 99999 PR PBB SHADOW E&M-EST. PATIENT-LVL II: ICD-10-PCS | Mod: PBBFAC,,, | Performed by: ADVANCED PRACTICE MIDWIFE

## 2022-01-05 PROCEDURE — 0502F SUBSEQUENT PRENATAL CARE: CPT | Mod: CPTII,S$GLB,, | Performed by: ADVANCED PRACTICE MIDWIFE

## 2022-01-05 PROCEDURE — 99999 PR PBB SHADOW E&M-EST. PATIENT-LVL II: CPT | Mod: PBBFAC,,, | Performed by: ADVANCED PRACTICE MIDWIFE
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
## 2022-01-09 ENCOUNTER — PATIENT MESSAGE (OUTPATIENT)
Dept: OBSTETRICS AND GYNECOLOGY | Facility: CLINIC | Age: 27
End: 2022-01-09
Payer: COMMERCIAL

## 2022-01-12 ENCOUNTER — ROUTINE PRENATAL (OUTPATIENT)
Dept: OBSTETRICS AND GYNECOLOGY | Facility: CLINIC | Age: 27
End: 2022-01-12
Payer: COMMERCIAL

## 2022-01-12 VITALS
SYSTOLIC BLOOD PRESSURE: 110 MMHG | BODY MASS INDEX: 39.34 KG/M2 | DIASTOLIC BLOOD PRESSURE: 70 MMHG | WEIGHT: 208.25 LBS

## 2022-01-12 DIAGNOSIS — Z34.90 PREGNANCY WITH ONE FETUS, ANTEPARTUM: Primary | ICD-10-CM

## 2022-01-12 PROCEDURE — 99999 PR PBB SHADOW E&M-EST. PATIENT-LVL II: CPT | Mod: PBBFAC,,, | Performed by: ADVANCED PRACTICE MIDWIFE

## 2022-01-12 PROCEDURE — 99999 PR PBB SHADOW E&M-EST. PATIENT-LVL II: ICD-10-PCS | Mod: PBBFAC,,, | Performed by: ADVANCED PRACTICE MIDWIFE

## 2022-01-12 PROCEDURE — 0502F PR SUBSEQUENT PRENATAL CARE: ICD-10-PCS | Mod: CPTII,S$GLB,, | Performed by: ADVANCED PRACTICE MIDWIFE

## 2022-01-12 PROCEDURE — 0502F SUBSEQUENT PRENATAL CARE: CPT | Mod: CPTII,S$GLB,, | Performed by: ADVANCED PRACTICE MIDWIFE
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
## 2022-01-19 ENCOUNTER — PATIENT MESSAGE (OUTPATIENT)
Dept: OBSTETRICS AND GYNECOLOGY | Facility: CLINIC | Age: 27
End: 2022-01-19

## 2022-01-19 ENCOUNTER — ROUTINE PRENATAL (OUTPATIENT)
Dept: OBSTETRICS AND GYNECOLOGY | Facility: CLINIC | Age: 27
End: 2022-01-19
Payer: COMMERCIAL

## 2022-01-19 VITALS
WEIGHT: 213.75 LBS | DIASTOLIC BLOOD PRESSURE: 68 MMHG | BODY MASS INDEX: 40.39 KG/M2 | SYSTOLIC BLOOD PRESSURE: 110 MMHG

## 2022-01-19 DIAGNOSIS — O48.0 POST-TERM PREGNANCY, 40-42 WEEKS OF GESTATION: Primary | ICD-10-CM

## 2022-01-19 PROCEDURE — 99999 PR PBB SHADOW E&M-EST. PATIENT-LVL II: CPT | Mod: PBBFAC,,, | Performed by: MIDWIFE

## 2022-01-19 PROCEDURE — 0502F PR SUBSEQUENT PRENATAL CARE: ICD-10-PCS | Mod: CPTII,S$GLB,, | Performed by: MIDWIFE

## 2022-01-19 PROCEDURE — 99999 PR PBB SHADOW E&M-EST. PATIENT-LVL II: ICD-10-PCS | Mod: PBBFAC,,, | Performed by: MIDWIFE

## 2022-01-19 PROCEDURE — 0502F SUBSEQUENT PRENATAL CARE: CPT | Mod: CPTII,S$GLB,, | Performed by: MIDWIFE
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
## 2022-01-24 ENCOUNTER — ROUTINE PRENATAL (OUTPATIENT)
Dept: OBSTETRICS AND GYNECOLOGY | Facility: CLINIC | Age: 27
End: 2022-01-24
Payer: COMMERCIAL

## 2022-01-24 VITALS
SYSTOLIC BLOOD PRESSURE: 118 MMHG | WEIGHT: 214.31 LBS | DIASTOLIC BLOOD PRESSURE: 68 MMHG | BODY MASS INDEX: 40.49 KG/M2

## 2022-01-24 DIAGNOSIS — Z34.03 ENCOUNTER FOR SUPERVISION OF NORMAL FIRST PREGNANCY IN THIRD TRIMESTER: Primary | ICD-10-CM

## 2022-01-24 PROCEDURE — 99999 PR PBB SHADOW E&M-EST. PATIENT-LVL II: ICD-10-PCS | Mod: PBBFAC,,, | Performed by: ADVANCED PRACTICE MIDWIFE

## 2022-01-24 PROCEDURE — 99999 PR PBB SHADOW E&M-EST. PATIENT-LVL II: CPT | Mod: PBBFAC,,, | Performed by: ADVANCED PRACTICE MIDWIFE

## 2022-01-24 PROCEDURE — 0502F SUBSEQUENT PRENATAL CARE: CPT | Mod: S$GLB,,, | Performed by: ADVANCED PRACTICE MIDWIFE

## 2022-01-24 PROCEDURE — 0502F PR SUBSEQUENT PRENATAL CARE: ICD-10-PCS | Mod: S$GLB,,, | Performed by: ADVANCED PRACTICE MIDWIFE
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
## 2022-01-26 ENCOUNTER — PATIENT MESSAGE (OUTPATIENT)
Dept: OBSTETRICS AND GYNECOLOGY | Facility: OTHER | Age: 27
End: 2022-01-26
Payer: COMMERCIAL

## 2022-01-26 ENCOUNTER — ANESTHESIA EVENT (OUTPATIENT)
Dept: OBSTETRICS AND GYNECOLOGY | Facility: OTHER | Age: 27
End: 2022-01-26
Payer: COMMERCIAL

## 2022-01-26 ENCOUNTER — HOSPITAL ENCOUNTER (INPATIENT)
Facility: OTHER | Age: 27
LOS: 3 days | Discharge: HOME | End: 2022-01-29
Attending: OBSTETRICS & GYNECOLOGY | Admitting: OBSTETRICS & GYNECOLOGY
Payer: COMMERCIAL

## 2022-01-26 ENCOUNTER — ANESTHESIA (OUTPATIENT)
Dept: OBSTETRICS AND GYNECOLOGY | Facility: OTHER | Age: 27
End: 2022-01-26
Payer: COMMERCIAL

## 2022-01-26 DIAGNOSIS — O48.0 POST-TERM PREGNANCY, 40-42 WEEKS OF GESTATION: ICD-10-CM

## 2022-01-26 DIAGNOSIS — Z34.90 PREGNANCY, UNSPECIFIED GESTATIONAL AGE: ICD-10-CM

## 2022-01-26 LAB
ABO + RH BLD: NORMAL
BASOPHILS # BLD AUTO: 0.04 K/UL (ref 0–0.2)
BASOPHILS NFR BLD: 0.3 % (ref 0–1.9)
BLD GP AB SCN CELLS X3 SERPL QL: NORMAL
DIFFERENTIAL METHOD: ABNORMAL
EOSINOPHIL # BLD AUTO: 0.2 K/UL (ref 0–0.5)
EOSINOPHIL NFR BLD: 1.4 % (ref 0–8)
ERYTHROCYTE [DISTWIDTH] IN BLOOD BY AUTOMATED COUNT: 12.9 % (ref 11.5–14.5)
HCT VFR BLD AUTO: 41.3 % (ref 37–48.5)
HGB BLD-MCNC: 14.3 G/DL (ref 12–16)
HIV 1+2 AB+HIV1 P24 AG SERPL QL IA: NEGATIVE
IMM GRANULOCYTES # BLD AUTO: 0.04 K/UL (ref 0–0.04)
IMM GRANULOCYTES NFR BLD AUTO: 0.3 % (ref 0–0.5)
LYMPHOCYTES # BLD AUTO: 2.3 K/UL (ref 1–4.8)
LYMPHOCYTES NFR BLD: 19.7 % (ref 18–48)
MCH RBC QN AUTO: 30 PG (ref 27–31)
MCHC RBC AUTO-ENTMCNC: 34.6 G/DL (ref 32–36)
MCV RBC AUTO: 87 FL (ref 82–98)
MONOCYTES # BLD AUTO: 0.8 K/UL (ref 0.3–1)
MONOCYTES NFR BLD: 6.7 % (ref 4–15)
NEUTROPHILS # BLD AUTO: 8.5 K/UL (ref 1.8–7.7)
NEUTROPHILS NFR BLD: 71.6 % (ref 38–73)
NRBC BLD-RTO: 0 /100 WBC
PLATELET # BLD AUTO: 226 K/UL (ref 150–450)
PMV BLD AUTO: 11.3 FL (ref 9.2–12.9)
RBC # BLD AUTO: 4.76 M/UL (ref 4–5.4)
SARS-COV-2 RDRP RESP QL NAA+PROBE: NEGATIVE
WBC # BLD AUTO: 11.84 K/UL (ref 3.9–12.7)

## 2022-01-26 PROCEDURE — 87389 HIV-1 AG W/HIV-1&-2 AB AG IA: CPT | Performed by: OBSTETRICS & GYNECOLOGY

## 2022-01-26 PROCEDURE — 86592 SYPHILIS TEST NON-TREP QUAL: CPT | Performed by: OBSTETRICS & GYNECOLOGY

## 2022-01-26 PROCEDURE — 59200 INSERT CERVICAL DILATOR: CPT

## 2022-01-26 PROCEDURE — 86901 BLOOD TYPING SEROLOGIC RH(D): CPT | Performed by: ADVANCED PRACTICE MIDWIFE

## 2022-01-26 PROCEDURE — 85025 COMPLETE CBC W/AUTO DIFF WBC: CPT | Performed by: OBSTETRICS & GYNECOLOGY

## 2022-01-26 PROCEDURE — U0002 COVID-19 LAB TEST NON-CDC: HCPCS | Performed by: ADVANCED PRACTICE MIDWIFE

## 2022-01-26 PROCEDURE — 25000003 PHARM REV CODE 250: Performed by: ADVANCED PRACTICE MIDWIFE

## 2022-01-26 PROCEDURE — 11000001 HC ACUTE MED/SURG PRIVATE ROOM

## 2022-01-26 RX ORDER — SIMETHICONE 80 MG
1 TABLET,CHEWABLE ORAL 4 TIMES DAILY PRN
Status: DISCONTINUED | OUTPATIENT
Start: 2022-01-26 | End: 2022-01-27

## 2022-01-26 RX ORDER — SODIUM CHLORIDE 9 MG/ML
INJECTION, SOLUTION INTRAVENOUS
Status: DISCONTINUED | OUTPATIENT
Start: 2022-01-26 | End: 2022-01-27

## 2022-01-26 RX ORDER — SODIUM CHLORIDE, SODIUM LACTATE, POTASSIUM CHLORIDE, CALCIUM CHLORIDE 600; 310; 30; 20 MG/100ML; MG/100ML; MG/100ML; MG/100ML
INJECTION, SOLUTION INTRAVENOUS CONTINUOUS
Status: DISCONTINUED | OUTPATIENT
Start: 2022-01-26 | End: 2022-01-27

## 2022-01-26 RX ORDER — OXYTOCIN/RINGER'S LACTATE 30/500 ML
95 PLASTIC BAG, INJECTION (ML) INTRAVENOUS ONCE
Status: DISCONTINUED | OUTPATIENT
Start: 2022-01-26 | End: 2022-01-27

## 2022-01-26 RX ORDER — TRANEXAMIC ACID 100 MG/ML
1000 INJECTION, SOLUTION INTRAVENOUS ONCE AS NEEDED
Status: DISCONTINUED | OUTPATIENT
Start: 2022-01-26 | End: 2022-01-27

## 2022-01-26 RX ORDER — PROCHLORPERAZINE EDISYLATE 5 MG/ML
5 INJECTION INTRAMUSCULAR; INTRAVENOUS EVERY 6 HOURS PRN
Status: DISCONTINUED | OUTPATIENT
Start: 2022-01-26 | End: 2022-01-27

## 2022-01-26 RX ORDER — SODIUM CHLORIDE, SODIUM LACTATE, POTASSIUM CHLORIDE, CALCIUM CHLORIDE 600; 310; 30; 20 MG/100ML; MG/100ML; MG/100ML; MG/100ML
INJECTION, SOLUTION INTRAVENOUS CONTINUOUS
Status: DISCONTINUED | OUTPATIENT
Start: 2022-01-27 | End: 2022-01-26 | Stop reason: SDUPTHER

## 2022-01-26 RX ORDER — CALCIUM CARBONATE 200(500)MG
500 TABLET,CHEWABLE ORAL 3 TIMES DAILY PRN
Status: DISCONTINUED | OUTPATIENT
Start: 2022-01-26 | End: 2022-01-27

## 2022-01-26 RX ORDER — OXYTOCIN/RINGER'S LACTATE 30/500 ML
0-30 PLASTIC BAG, INJECTION (ML) INTRAVENOUS CONTINUOUS
Status: DISCONTINUED | OUTPATIENT
Start: 2022-01-27 | End: 2022-01-27

## 2022-01-26 RX ORDER — TERBUTALINE SULFATE 1 MG/ML
0.25 INJECTION SUBCUTANEOUS
Status: DISCONTINUED | OUTPATIENT
Start: 2022-01-26 | End: 2022-01-27

## 2022-01-26 RX ORDER — OXYTOCIN/RINGER'S LACTATE 30/500 ML
334 PLASTIC BAG, INJECTION (ML) INTRAVENOUS ONCE
Status: DISCONTINUED | OUTPATIENT
Start: 2022-01-26 | End: 2022-01-27

## 2022-01-26 RX ORDER — ONDANSETRON 8 MG/1
8 TABLET, ORALLY DISINTEGRATING ORAL EVERY 8 HOURS PRN
Status: DISCONTINUED | OUTPATIENT
Start: 2022-01-26 | End: 2022-01-27

## 2022-01-26 RX ADMIN — MISOPROSTOL 50 MCG: 100 TABLET ORAL at 07:01

## 2022-01-27 LAB — RPR SER QL: NORMAL

## 2022-01-27 PROCEDURE — 59400 OBSTETRICAL CARE: CPT | Mod: QY,,, | Performed by: ANESTHESIOLOGY

## 2022-01-27 PROCEDURE — 63600175 PHARM REV CODE 636 W HCPCS: Performed by: ADVANCED PRACTICE MIDWIFE

## 2022-01-27 PROCEDURE — 25000003 PHARM REV CODE 250: Performed by: ADVANCED PRACTICE MIDWIFE

## 2022-01-27 PROCEDURE — 27200710 HC EPIDURAL INFUSION PUMP SET: Performed by: ANESTHESIOLOGY

## 2022-01-27 PROCEDURE — 11000001 HC ACUTE MED/SURG PRIVATE ROOM

## 2022-01-27 PROCEDURE — 72200004 HC VAGINAL DELIVERY LEVEL I

## 2022-01-27 PROCEDURE — 25000003 PHARM REV CODE 250: Performed by: STUDENT IN AN ORGANIZED HEALTH CARE EDUCATION/TRAINING PROGRAM

## 2022-01-27 PROCEDURE — 62326 NJX INTERLAMINAR LMBR/SAC: CPT | Performed by: STUDENT IN AN ORGANIZED HEALTH CARE EDUCATION/TRAINING PROGRAM

## 2022-01-27 PROCEDURE — 59400 PRA FULL ROUT OBSTE CARE,VAGINAL DELIV: ICD-10-PCS | Mod: QY,,, | Performed by: ANESTHESIOLOGY

## 2022-01-27 PROCEDURE — C1751 CATH, INF, PER/CENT/MIDLINE: HCPCS | Performed by: ANESTHESIOLOGY

## 2022-01-27 PROCEDURE — 63600175 PHARM REV CODE 636 W HCPCS: Performed by: STUDENT IN AN ORGANIZED HEALTH CARE EDUCATION/TRAINING PROGRAM

## 2022-01-27 RX ORDER — CARBOPROST TROMETHAMINE 250 UG/ML
250 INJECTION, SOLUTION INTRAMUSCULAR
Status: DISCONTINUED | OUTPATIENT
Start: 2022-01-27 | End: 2022-01-27

## 2022-01-27 RX ORDER — FAMOTIDINE 10 MG/ML
20 INJECTION INTRAVENOUS ONCE
Status: DISCONTINUED | OUTPATIENT
Start: 2022-01-27 | End: 2022-01-27

## 2022-01-27 RX ORDER — SIMETHICONE 80 MG
1 TABLET,CHEWABLE ORAL EVERY 6 HOURS PRN
Status: DISCONTINUED | OUTPATIENT
Start: 2022-01-28 | End: 2022-01-29 | Stop reason: HOSPADM

## 2022-01-27 RX ORDER — DIPHENHYDRAMINE HYDROCHLORIDE 50 MG/ML
25 INJECTION INTRAMUSCULAR; INTRAVENOUS EVERY 4 HOURS PRN
Status: DISCONTINUED | OUTPATIENT
Start: 2022-01-28 | End: 2022-01-29 | Stop reason: HOSPADM

## 2022-01-27 RX ORDER — MISOPROSTOL 200 UG/1
800 TABLET ORAL
Status: DISCONTINUED | OUTPATIENT
Start: 2022-01-27 | End: 2022-01-27

## 2022-01-27 RX ORDER — MISOPROSTOL 200 UG/1
TABLET ORAL
Status: DISCONTINUED
Start: 2022-01-27 | End: 2022-01-27 | Stop reason: WASHOUT

## 2022-01-27 RX ORDER — SODIUM CHLORIDE 0.9 % (FLUSH) 0.9 %
10 SYRINGE (ML) INJECTION
Status: DISCONTINUED | OUTPATIENT
Start: 2022-01-28 | End: 2022-01-29 | Stop reason: HOSPADM

## 2022-01-27 RX ORDER — ONDANSETRON 8 MG/1
8 TABLET, ORALLY DISINTEGRATING ORAL EVERY 8 HOURS PRN
Status: DISCONTINUED | OUTPATIENT
Start: 2022-01-28 | End: 2022-01-29 | Stop reason: HOSPADM

## 2022-01-27 RX ORDER — DIPHENHYDRAMINE HCL 25 MG
25 CAPSULE ORAL EVERY 4 HOURS PRN
Status: DISCONTINUED | OUTPATIENT
Start: 2022-01-28 | End: 2022-01-29 | Stop reason: HOSPADM

## 2022-01-27 RX ORDER — FENTANYL CITRATE 50 UG/ML
INJECTION, SOLUTION INTRAMUSCULAR; INTRAVENOUS
Status: DISCONTINUED | OUTPATIENT
Start: 2022-01-27 | End: 2022-01-27

## 2022-01-27 RX ORDER — ACETAMINOPHEN 325 MG/1
650 TABLET ORAL EVERY 6 HOURS PRN
Status: DISCONTINUED | OUTPATIENT
Start: 2022-01-28 | End: 2022-01-29 | Stop reason: HOSPADM

## 2022-01-27 RX ORDER — METHYLERGONOVINE MALEATE 0.2 MG/ML
INJECTION INTRAVENOUS
Status: DISCONTINUED
Start: 2022-01-27 | End: 2022-01-27 | Stop reason: WASHOUT

## 2022-01-27 RX ORDER — CARBOPROST TROMETHAMINE 250 UG/ML
INJECTION, SOLUTION INTRAMUSCULAR
Status: DISCONTINUED
Start: 2022-01-27 | End: 2022-01-27 | Stop reason: WASHOUT

## 2022-01-27 RX ORDER — LIDOCAINE HYDROCHLORIDE AND EPINEPHRINE 15; 5 MG/ML; UG/ML
INJECTION, SOLUTION EPIDURAL
Status: DISCONTINUED | OUTPATIENT
Start: 2022-01-27 | End: 2022-01-27

## 2022-01-27 RX ORDER — FENTANYL/BUPIVACAINE/NS/PF 2MCG/ML-.1
PLASTIC BAG, INJECTION (ML) INJECTION CONTINUOUS
Status: DISCONTINUED | OUTPATIENT
Start: 2022-01-27 | End: 2022-01-27

## 2022-01-27 RX ORDER — METHYLERGONOVINE MALEATE 0.2 MG/ML
200 INJECTION INTRAVENOUS
Status: DISCONTINUED | OUTPATIENT
Start: 2022-01-27 | End: 2022-01-27

## 2022-01-27 RX ORDER — OXYTOCIN 10 [USP'U]/ML
INJECTION, SOLUTION INTRAMUSCULAR; INTRAVENOUS
Status: DISCONTINUED
Start: 2022-01-27 | End: 2022-01-27 | Stop reason: WASHOUT

## 2022-01-27 RX ORDER — PRENATAL WITH FERROUS FUM AND FOLIC ACID 3080; 920; 120; 400; 22; 1.84; 3; 20; 10; 1; 12; 200; 27; 25; 2 [IU]/1; [IU]/1; MG/1; [IU]/1; MG/1; MG/1; MG/1; MG/1; MG/1; MG/1; UG/1; MG/1; MG/1; MG/1; MG/1
1 TABLET ORAL DAILY
Status: DISCONTINUED | OUTPATIENT
Start: 2022-01-28 | End: 2022-01-29 | Stop reason: HOSPADM

## 2022-01-27 RX ORDER — PROCHLORPERAZINE EDISYLATE 5 MG/ML
5 INJECTION INTRAMUSCULAR; INTRAVENOUS EVERY 6 HOURS PRN
Status: DISCONTINUED | OUTPATIENT
Start: 2022-01-28 | End: 2022-01-29 | Stop reason: HOSPADM

## 2022-01-27 RX ORDER — DIPHENOXYLATE HYDROCHLORIDE AND ATROPINE SULFATE 2.5; .025 MG/1; MG/1
1 TABLET ORAL 4 TIMES DAILY PRN
Status: DISCONTINUED | OUTPATIENT
Start: 2022-01-27 | End: 2022-01-27

## 2022-01-27 RX ORDER — BUPIVACAINE HYDROCHLORIDE 2.5 MG/ML
INJECTION, SOLUTION EPIDURAL; INFILTRATION; INTRACAUDAL
Status: DISPENSED
Start: 2022-01-27 | End: 2022-01-27

## 2022-01-27 RX ORDER — FENTANYL/BUPIVACAINE/NS/PF 2MCG/ML-.1
PLASTIC BAG, INJECTION (ML) INJECTION CONTINUOUS PRN
Status: DISCONTINUED | OUTPATIENT
Start: 2022-01-27 | End: 2022-01-27

## 2022-01-27 RX ORDER — DOCUSATE SODIUM 100 MG/1
200 CAPSULE, LIQUID FILLED ORAL 2 TIMES DAILY PRN
Status: DISCONTINUED | OUTPATIENT
Start: 2022-01-28 | End: 2022-01-29 | Stop reason: HOSPADM

## 2022-01-27 RX ORDER — FENTANYL/BUPIVACAINE/NS/PF 2MCG/ML-.1
PLASTIC BAG, INJECTION (ML) INJECTION
Status: COMPLETED
Start: 2022-01-27 | End: 2022-01-27

## 2022-01-27 RX ORDER — OXYTOCIN/RINGER'S LACTATE 30/500 ML
95 PLASTIC BAG, INJECTION (ML) INTRAVENOUS ONCE
Status: DISCONTINUED | OUTPATIENT
Start: 2022-01-28 | End: 2022-01-29 | Stop reason: HOSPADM

## 2022-01-27 RX ORDER — HYDROCORTISONE 25 MG/G
CREAM TOPICAL 3 TIMES DAILY PRN
Status: DISCONTINUED | OUTPATIENT
Start: 2022-01-28 | End: 2022-01-29 | Stop reason: HOSPADM

## 2022-01-27 RX ORDER — SODIUM CITRATE AND CITRIC ACID MONOHYDRATE 334; 500 MG/5ML; MG/5ML
30 SOLUTION ORAL ONCE
Status: DISCONTINUED | OUTPATIENT
Start: 2022-01-27 | End: 2022-01-27

## 2022-01-27 RX ORDER — FENTANYL CITRATE 50 UG/ML
INJECTION, SOLUTION INTRAMUSCULAR; INTRAVENOUS
Status: COMPLETED
Start: 2022-01-27 | End: 2022-01-27

## 2022-01-27 RX ADMIN — ONDANSETRON 8 MG: 8 TABLET, ORALLY DISINTEGRATING ORAL at 09:01

## 2022-01-27 RX ADMIN — Medication 5 ML: at 11:01

## 2022-01-27 RX ADMIN — LIDOCAINE HYDROCHLORIDE,EPINEPHRINE BITARTRATE 3 ML: 15; .005 INJECTION, SOLUTION EPIDURAL; INFILTRATION; INTRACAUDAL; PERINEURAL at 11:01

## 2022-01-27 RX ADMIN — Medication 2 MILLI-UNITS/MIN: at 12:01

## 2022-01-27 RX ADMIN — FENTANYL CITRATE 100 MCG: 50 INJECTION, SOLUTION INTRAMUSCULAR; INTRAVENOUS at 11:01

## 2022-01-27 RX ADMIN — SODIUM CHLORIDE, SODIUM LACTATE, POTASSIUM CHLORIDE, AND CALCIUM CHLORIDE: .6; .31; .03; .02 INJECTION, SOLUTION INTRAVENOUS at 12:01

## 2022-01-27 RX ADMIN — Medication 10 ML/HR: at 11:01

## 2022-01-27 RX ADMIN — SODIUM CHLORIDE, SODIUM LACTATE, POTASSIUM CHLORIDE, AND CALCIUM CHLORIDE: .6; .31; .03; .02 INJECTION, SOLUTION INTRAVENOUS at 05:01

## 2022-01-27 NOTE — ANESTHESIA PREPROCEDURE EVALUATION
[facility]  Anesthesia Pre-Operative Evaluation         Patient Name: Bhargavi Hitchcock  YOB: 1995  MRN: [id number]    2022      Bhargavi Hitchcock is a 26 y.o. female  at 41w1d who presents for scheduled IOL at term. Current IUP has been complicated by a history of bipolar disorder that has been well-controlled.     She denies previous neuraxial anesthesia. She does report previous general anesthesia and denies complications or issues. She does not want an epidural for this delivery but is open to nitrous oxide.     She denies history of HTN, asthma, bleeding or coagulation disorders, spine abnormalities, or previous back surgeries.      OB History    Para Term  AB Living   1 0 0 0 0 0   SAB IAB Ectopic Multiple Live Births   0 0 0 0        # Outcome Date GA Lbr Conner/2nd Weight Sex Delivery Anes PTL Lv   1 Current                Review of patient's allergies indicates:   Allergen Reactions    Ketorolac Shortness Of Breath       Wt Readings from Last 1 Encounters:   22 0853 97.2 kg (214 lb 4.6 oz)       BP Readings from Last 3 Encounters:   22 118/68   22 110/68   22 110/70       Patient Active Problem List   Diagnosis    BIpolar 2 hx/ at risk pp depression/ anxiety    Pregnancy    History of opioid abuse    Posture abnormality    Weakness of both hips    Pelvic pain in pregnancy    COVID-19 affecting pregnancy, antepartum       Past Surgical History:   Procedure Laterality Date    BREAST SURGERY Right 2012    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    NASAL SEPTUM SURGERY  2015    NOSE SURGERY  2015    TONSILLECTOMY  2015    ruptured and hospitalized, healed well       Tobacco Use: Medium Risk    Smoking Tobacco Use: Former Smoker    Smokeless Tobacco Use: Former User     Alcohol Use: Not At Risk    Frequency of Alcohol Consumption: Monthly or less    Average Number of Drinks: 3 or 4    Frequency of Binge Drinking:  Never     Social History     Substance and Sexual Activity   Drug Use Not Currently    Frequency: 7.0 times per week    Types: Cocaine, Marijuana, Heroin    Comment: sober for 2 years, on her own         Chemistry        Component Value Date/Time     10/08/2018 0135    K 3.3 (L) 10/08/2018 0135     10/08/2018 0135    CO2 27 10/08/2018 0135    BUN 9 10/08/2018 0135    CREATININE 0.8 10/08/2018 0135    GLU 65 (L) 10/08/2018 0135        Component Value Date/Time    CALCIUM 9.9 10/08/2018 0135    ALKPHOS 46 (L) 01/15/2017 2358    AST 20 01/15/2017 2358    ALT 13 01/15/2017 2358    BILITOT 0.2 01/15/2017 2358    ESTGFRAFRICA >60.0 10/08/2018 0135    EGFRNONAA >60.0 10/08/2018 0135          Lab Results   Component Value Date    WBC 11.49 10/20/2021    HGB 13.2 10/20/2021    HCT 38.2 10/20/2021     10/20/2021       Lab Results   Component Value Date    LABPROT 10.9 2016    INR 1.0 2016       Anesthesia Evaluation    I have reviewed the Patient Summary Reports.    I have reviewed the Nursing Notes. I have reviewed the NPO Status.   I have reviewed the Medications.     Review of Systems  Anesthesia Hx:  No previous Anesthesia   Denies Personal Hx of Anesthesia complications.   Social:  Former Smoker    Hematology/Oncology:         -- Coag Disorders: Denies Bleeding Disorder:   Cardiovascular:   Denies Hypertension.  Denies Valvular problems/Murmurs.  Denies Dysrhythmias.         Pulmonary:   Denies Asthma.  Denies Shortness of breath.    Renal/:   Denies Chronic Renal Disease.     Hepatic/GI:   Denies Liver Disease.    OB/GYN/PEDS:  Planned Vaginal Delivery   1  , Para 0  Denies Issues with Current Pregnancy  Denies Neuraxial Anesthesia - Previous History    Neurological:   Denies CVA. Denies Seizures.    Endocrine:   Denies Diabetes.    Psych:  Psychotic Disorder and Bipolar disorder.          Physical Exam  General:  Well nourished, Obesity    Airway/Jaw/Neck:  Airway Findings:  Mouth Opening: Normal Tongue: Normal  General Airway Assessment: Adult  Mallampati: II  TM Distance: Normal, at least 6 cm       Chest/Lungs:  Chest/Lungs Clear    Heart/Vascular:  Heart Findings: Normal       Mental Status:  Mental Status Findings:  Cooperative, Alert and Oriented         Anesthesia Plan  Type of Anesthesia, risks & benefits discussed:  Anesthesia Type:  CSE, epidural, general, spinal    Patient's Preference:   Plan Factors:          Intra-op Monitoring Plan: standard ASA monitors  Intra-op Monitoring Plan Comments:   Post Op Pain Control Plan: multimodal analgesia and per primary service following discharge from PACU  Post Op Pain Control Plan Comments:     Induction:   IV  Beta Blocker:  Patient is not currently on a Beta-Blocker (No further documentation required).       Informed Consent: Patient understands risks and agrees with Anesthesia plan.  Questions answered. Anesthesia consent signed with patient.  ASA Score: 2     Day of Surgery Review of History & Physical:    H&P update referred to the surgeon.         Ready For Surgery From Anesthesia Perspective.

## 2022-01-27 NOTE — ANESTHESIA PROCEDURE NOTES
Epidural    Patient location during procedure: OB   Reason for block: primary anesthetic   Reason for block: labor analgesia requested by patient and obstetrician  Diagnosis: IUP   Start time: 1/27/2022 11:15 AM  Timeout: 1/27/2022 11:10 AM  End time: 1/27/2022 11:25 AM  Surgery related to: Vaginal Delivery    Staffing  Performing Provider: More Cruz MD  Authorizing Provider: Patti Roberto MD        Preanesthetic Checklist  Completed: patient identified, IV checked, site marked, risks and benefits discussed, surgical consent, monitors and equipment checked, pre-op evaluation, timeout performed, anesthesia consent given, hand hygiene performed and patient being monitored  Preparation  Patient position: sitting  Prep: ChloraPrep  Patient monitoring: Pulse Ox  Reason for block: primary anesthetic   Epidural  Skin Anesthetic: lidocaine 1%  Skin Wheal: 3 mL  Administration type: continuous  Approach: midline  Interspace: L3-4    Injection technique: MAVERICK saline  Needle and Epidural Catheter  Needle type: Tuohy   Needle gauge: 17  Needle length: 3.5 inches  Needle insertion depth: 9 cm  Catheter type: springwound  Catheter size: 19 G  Catheter at skin depth: 14 cm  Insertion Attempts: 2  Test dose: 3 mL of lidocaine 1.5% with Epi 1-to-200,000  Additional Documentation: incremental injection, negative aspiration for heme and CSF, no paresthesia on injection, no signs/symptoms of IV or SA injection, no significant pain on injection and no significant complaints from patient  Needle localization: anatomical landmarks  Medications:  Volume per aspiration: 5 mL  Time between aspirations: 5 minutes  Assessment  Ease of block: easyNo inadvertent dural puncture with Tuohy.  Dural puncture not performed with spinal needle

## 2022-01-27 NOTE — PROGRESS NOTES
[facility] - Labor & Delivery  Obstetrics  Labor Progress Note    Patient Name: Bhargavi Hitchcock  MRN: [id number]  Admission Date: 2022  Hospital Length of Stay: 1 days  Attending Physician: Rupali Henry MD  Primary Care Provider: Primary Doctor No    Subjective:     Principal Problem:Encounter for planned induction of labor    Interval History:  Bhargavi is a 26 y.o.  at 41w2d. She is doing well. In bed using nitrous with ctx.  Crying occasionally   and mother @ bedside.    Objective:     Vital Signs (Most Recent):  Temp: 98 °F (36.7 °C) (22 0515)  Pulse: 70 (22)  Resp: 20 (22)  BP: 118/61 (22)  SpO2: 97 % (22) Vital Signs (24h Range):  Temp:  [97.6 °F (36.4 °C)-98.3 °F (36.8 °C)] 98 °F (36.7 °C)  Pulse:  [60-79] 70  Resp:  [16-20] 20  SpO2:  [96 %-99 %] 97 %  BP: (113-127)/(58-72) 118/61        There is no height or weight on file to calculate BMI.    FHT: 140'sCat 1 (reassuring)  TOCO:  Q 4-5 minutes    Physical Exam    Cervical Exam:  Dilation:  deferred   Effacement:    Station:   Presentation:      Significant Labs:d  Lab Results   Component Value Date    GROUPTRH O POS 2022    HEPBSAG Negative 2021    STREPBCULT No Group B Streptococcus isolated 2021       I have personallly reviewed all pertinent lab results from the last 24 hours.  None    Assessment/Plan:     26 y.o. female  at 41w2d for:    Active Diagnoses:    Diagnosis Date Noted POA    PRINCIPAL PROBLEM:  Encounter for planned induction of labor [Z34.90] 2022 Not Applicable    BIpolar 2 hx/ at risk pp depression/ anxiety [Z86.59] 2021 Not Applicable    History of opioid abuse [F11.11] 2021 Yes      Problems Resolved During this Admission:       Continue pitocin and increase prn.reevaluate prn.    Jennifer Begum CNM  Obstetrics  [facility] - Labor & Delivery

## 2022-01-27 NOTE — HOSPITAL COURSE
1820 1.5/70-3 intact, medium, cephalic, posterior  1842 Fournier balloon inserted (cytotec 50mg PO to follow once approved by pharmacy)  2340 3/70/-3. Fournier balloon fell out. Patient agrees to start pitocin per protocol.    PPD#1- nl involution. See note  PPD #2-doing well breastfeeding, ready for d/c home. Hx of bipolar d/o (hypomania) no manic episodes--educated on importance of sleep and close f/u by mental health team.

## 2022-01-27 NOTE — PROGRESS NOTES
LABOR NOTE    S:  Pt sitting on side of bed, moaning through contractions. Was in tub for a while and feels like that helped her baby to change position (back pain is not as intense now). She is requesting SVE. Delfino is present and supportive. He is attentive to Bhargavi's needs and requests.    O: /72 (BP Location: Left arm, Patient Position: Sitting)   Pulse 79   Temp 98 °F (36.7 °C) (Oral)   Resp 20   LMP 2021 (Exact Date)   SpO2 97%   Breastfeeding No     GENERAL: Calm and appropriate affect  NEURO: Alert, oriented, normal speech  ABDOMEN: Nontender, Fundus palpates soft between UC's.  FHT: Baseline 135bpm, moderate variability, positive accels, no decels. Cat 1, reassuring.  CTX: q 2-4 minutes  SVE: 3/70/-3 intact, posterior, cephalic, soft      ASSESSMENT:   26 y.o.  IUP at 41w2d, FHT reassuring/ Cat 1    Patient Active Problem List   Diagnosis    BIpolar 2 hx/ at risk pp depression/ anxiety    Pregnancy    History of opioid abuse    Posture abnormality    Weakness of both hips    Pelvic pain in pregnancy    COVID-19 affecting pregnancy, antepartum    Encounter for planned induction of labor         PLAN:  Continue close Maternal/Fetal monitoring  Pitocin Augmentation per protocol - currently infusing at 6mu  Maintain continuous EFM and toco while being given IOL medications  Okay to labor in tub if desired  Encouraged rest when able, mobility  Labor support / anesthesia PRN

## 2022-01-27 NOTE — PROGRESS NOTES
[facility] - Labor & Delivery  Obstetrics  Labor Progress Note    Patient Name: Bhargavi Hitchcock  MRN: [id number]  Admission Date: 2022  Hospital Length of Stay: 1 days  Attending Physician: Rupali Henry MD  Primary Care Provider: Primary Doctor No    Subjective:     Principal Problem:Encounter for planned induction of labor    Interval History:  Bhargavi is a 26 y.o.  at 41w2d. She is crying and moaning throughout ctx.  Wants epidural.   at bedside and agrees.      Objective:     Vital Signs (Most Recent):  Temp: 98 °F (36.7 °C) (22 0515)  Pulse: 75 (22 0900)  Resp: 20 (22 0642)  BP: 118/61 (22 0642)  SpO2: 97 % (22 0900) Vital Signs (24h Range):  Temp:  [97.6 °F (36.4 °C)-98.3 °F (36.8 °C)] 98 °F (36.7 °C)  Pulse:  [60-79] 75  Resp:  [16-20] 20  SpO2:  [96 %-99 %] 97 %  BP: (113-127)/(58-72) 118/61        There is no height or weight on file to calculate BMI.    FHT: 140sCat 1 (reassuring)  TOCO:  Q 5-6 minutes    Physical Exam    Cervical Exam: deferred until epidural in place  Dilation:    Effacement:    Station:   Presentation:      Significant Labs:  Lab Results   Component Value Date    GROUPTRH O POS 2022    HEPBSAG Negative 2021    STREPBCULT No Group B Streptococcus isolated 2021       I have personallly reviewed all pertinent lab results from the last 24 hours.    Assessment/Plan:     26 y.o. female  at 41w2d for:    Active Diagnoses:    Diagnosis Date Noted POA    PRINCIPAL PROBLEM:  Encounter for planned induction of labor [Z34.90] 2022 Not Applicable    BIpolar 2 hx/ at risk pp depression/ anxiety [Z86.59] 2021 Not Applicable    History of opioid abuse [F11.11] 2021 Yes      Problems Resolved During this Admission:       Epidural  VE after comfortable  Continue     Jennifer Begum CNM  Obstetrics  [facility] - Labor & Delivery

## 2022-01-27 NOTE — PROGRESS NOTES
[facility] - Labor & Delivery  Obstetrics  Labor Progress Note    Patient Name: Bhargavi Hitchcock  MRN: [id number]  Admission Date: 2022  Hospital Length of Stay: 1 days  Attending Physician: Rupali Henry MD  Primary Care Provider: Primary Doctor No    Subjective:     Principal Problem:Encounter for planned induction of labor    Interval History:  Bhargavi is a 26 y.o.  at 41w2d. She is doing well. Called to room for decel after changing positions     Pitocin  to 10 mu   Objective:     Vital Signs (Most Recent):  Temp: 97.9 °F (36.6 °C) (22 1200)  Pulse: 71 (22 1336)  Resp: 20 (22 0642)  BP: (!) 109/57 (22 1321)  SpO2: 97 % (22 1336) Vital Signs (24h Range):  Temp:  [97.6 °F (36.4 °C)-98.3 °F (36.8 °C)] 97.9 °F (36.6 °C)  Pulse:  [] 71  Resp:  [16-20] 20  SpO2:  [92 %-100 %] 97 %  BP: ()/(52-72) 109/57        There is no height or weight on file to calculate BMI.    FHT: 140sCat 2 (reassuring)   decel down to 80-90's with good recovery after position change and fluid bolus  TOCO:  Q 3-4 minutes    Physical Exam    Cervical Exam:  Dilation:  8  Effacement:  90%  Station: -2  Presentation: Vertex     Significant Labs:  Lab Results   Component Value Date    GROUPTRH O POS 2022    HEPBSAG Negative 2021    STREPBCULT No Group B Streptococcus isolated 2021       I have personallly reviewed all pertinent lab results from the last 24 hours.    Assessment/Plan:     26 y.o. female  at 41w2d for:    Active Diagnoses:    Diagnosis Date Noted POA    PRINCIPAL PROBLEM:  Encounter for planned induction of labor [Z34.90] 2022 Not Applicable    BIpolar 2 hx/ at risk pp depression/ anxiety [Z86.59] 2021 Not Applicable    History of opioid abuse [F11.11] 2021 Yes      Problems Resolved During this Admission:       Continue pitocin @ 10 mu  Recheck prn      Jennifer Begum CNM  Obstetrics  [facility] - Labor & Delivery

## 2022-01-27 NOTE — DISCHARGE INSTRUCTIONS
Breastfeeding discharge instructions given with First Alert form and reviewed. Please complete First Alert within 3-5 days after the baby's birth. ( Please call the Breastfeeding Warmline ( [phone]) or the baby's pediatrician if you have any concerns.     Also discussed:   AAP recommendation of exclusive breastfeeding for the first 6 months of life and continued breastfeeding with the introduction of supplemental foods beyond the first year of life. The AAP recommends breastfeeding for for at least a year or longer. Instructed on the recommendation to delay all bottle and pacifier use until after 4 weeks of age and breastfeeding is well established.  Discussed the benefits of exclusive breastfeeding for both mother and baby.  Discussed the risks of supplementation/pacifier use on the exclusivity of breastfeeding in the first 6 months. Feed the baby at the earliest sign of hunger or comfort  o Hands to mouth, sucking motions  o Rooting or searching for something to suck on  o Dont wait for crying - it is a not a late sign of hunger; it is a sign of distress     The feedings may be 8-12 times per 24hrs and will not follow a schedule   Alternate the breast you start the feeding with, or start with the breast that feels the fullest   Switch breasts when the baby takes himself off the breast or falls asleep   Keep offering breasts until the baby looks full, no longer gives hunger signs, and stays asleep when placed on his back in the crib   If the baby is sleepy and wont wake for a feeding, put the baby skin-to-skin dressed in a diaper against the mothers bare chest   Sleep near your baby   The baby should be positioned and latched on to the breast correctly  o Chest-to-chest, chin in the breast  o Babys lips are flipped outward  o Babys mouth is stretched open wide like a shout  o Babys sucking should feel like tugging to the mother  - The baby should be drinking at the breast:  o You should hear  swallowing or gulping throughout the feeding  o You should see milk on the babys lips when he comes off the breast  o Your breasts should be softer when the baby is finished feeding  o The baby should look relaxed at the end of feedings  o After the 4th day and your milk is in:  o The babys poop should turn bright yellow and be loose, watery, and seedy  o The baby should have at least 3-4 poops the size of the palm of your hand per day  o The baby should have at least 6-8 wet diapers per day  o The urine should be light yellow in color  You should drink when you are thirsty and eat a healthy diet when you are    hungry.     Take naps to get the rest you need.   Take medications and/or drink alcohol only with permission of your obstetrician    or the babys pediatrician.  You can also call the [facility],   ([phone]), Monday-Friday, 8am-5pm Central time, to get the most   up-to-date evidence-based information on the use of medications during   pregnancy and breastfeeding.      The baby should be examined by a pediatrician at 3-5 days of age and again at 2 weeks of age unless ordered sooner by the pediatrician.  Once your milk production increases the baby should gain at least 1/2-1oz each day and should be back to birth weight no later than 10-14 days of age.If this is not the case, please call the Breastfeeding Warmline ( [phone]) for assistance and support.

## 2022-01-27 NOTE — PROGRESS NOTES
[facility] - Labor & Delivery  Obstetrics  Labor Progress Note    Patient Name: Bhargavi Hitchcock  MRN: [id number]  Admission Date: 2022  Hospital Length of Stay: 1 days  Attending Physician: Rupali Henry MD  Primary Care Provider: Primary Doctor No    Subjective:     Principal Problem:Encounter for planned induction of labor    Interval History:  Bhargavi is a 26 y.o.  at 41w2d. She is doing well. In tub, breathing through ctx and sleeping in-between.   in tub with her.   States ctx are stronger.  Pitocin @ 6 mu.    Objective:     Vital Signs (Most Recent):  Temp: 98 °F (36.7 °C) (22)  Pulse: 70 (22)  Resp: 20 (22)  BP: 118/61 (22)  SpO2: 97 % (22) Vital Signs (24h Range):  Temp:  [97.6 °F (36.4 °C)-98.3 °F (36.8 °C)] 98 °F (36.7 °C)  Pulse:  [60-79] 70  Resp:  [16-20] 20  SpO2:  [96 %-99 %] 97 %  BP: (113-127)/(58-72) 118/61        There is no height or weight on file to calculate BMI.    FHT: 140'sCat 1 (reassuring)  TOCO:  Q 3-4 minutes    Physical Exam    Cervical Exam:  deferred  Dilation:    Effacement:    Station:   Presentation:      Significant Labs:  Lab Results   Component Value Date    GROUPTRH O POS 2022    HEPBSAG Negative 2021    STREPBCULT No Group B Streptococcus isolated 2021       I have personallly reviewed all pertinent lab results from the last 24 hours.    Assessment/Plan:     26 y.o. female  at 41w2d for:    Active Diagnoses:    Diagnosis Date Noted POA    PRINCIPAL PROBLEM:  Encounter for planned induction of labor [Z34.90] 2022 Not Applicable    BIpolar 2 hx/ at risk pp depression/ anxiety [Z86.59] 2021 Not Applicable    History of opioid abuse [F11.11] 2021 Yes      Problems Resolved During this Admission:       Continue pitocin induction  Reassess prn    LISA TarangoM  Obstetrics  [facility] - Labor & Delivery

## 2022-01-27 NOTE — PROGRESS NOTES
[facility] - Labor & Delivery  Obstetrics  Labor Progress Note    Patient Name: Bhargavi Hitchcock  MRN: [id number]  Admission Date: 2022  Hospital Length of Stay: 1 days  Attending Physician: Rupali Henry MD  Primary Care Provider: Primary Doctor No    Subjective:     Principal Problem:Encounter for planned induction of labor    Interval History:  Bhargavi is a 26 y.o.  at 41w2d. She is doing well. Getting through ctx, but crying and moaning.  In bed with peanut ball.  Delfino at bedside.    Objective:     Vital Signs (Most Recent):  Temp: 98 °F (36.7 °C) (22 0515)  Pulse: 75 (22 0900)  Resp: 20 (22 0642)  BP: 118/61 (22 0642)  SpO2: 97 % (22 0900) Vital Signs (24h Range):  Temp:  [97.6 °F (36.4 °C)-98.3 °F (36.8 °C)] 98 °F (36.7 °C)  Pulse:  [60-79] 75  Resp:  [16-20] 20  SpO2:  [96 %-99 %] 97 %  BP: (113-127)/(58-72) 118/61        There is no height or weight on file to calculate BMI.    FHT: 140'sCat 1 (reassuring)  TOCO:  Q 4-5 minutes    Physical Exam    Cervical Exam:  Dilation:  4-5  Effacement:  80%  Station: -3  Presentation: Vertex     Significant Labs:  Lab Results   Component Value Date    GROUPTRH O POS 2022    HEPBSAG Negative 2021    STREPBCULT No Group B Streptococcus isolated 2021       I have personallly reviewed all pertinent lab results from the last 24 hours.    Assessment/Plan:     26 y.o. female  at 41w2d for:    Active Diagnoses:    Diagnosis Date Noted POA    PRINCIPAL PROBLEM:  Encounter for planned induction of labor [Z34.90] 2022 Not Applicable    BIpolar 2 hx/ at risk pp depression/ anxiety [Z86.59] 2021 Not Applicable    History of opioid abuse [F11.11] 2021 Yes      Problems Resolved During this Admission:       Continue pitocin induction  Continue position changes  Epidural prn    Jennifer Begum NAZIA  Obstetrics  [facility] - Labor & Delivery
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
## 2022-01-27 NOTE — HPI
"History of Present Illness:   Bhargavi Hitchcock is a 26 y.o. female  at 41w1d with Estimated Date of Delivery: 22 who presents to Labor and Delivery accompanied by her  "Delfino", and her mother. Expecting a girl! Bhargavi is here for a scheduled IOL due to being 41w1d gestation.    Mild irregular contractions, active fetal movement, No vaginal bleeding , No loss of fluid.       This pregnancy has been complicated by   Patient Active Problem List   Diagnosis    BIpolar 2 hx/ at risk pp depression/ anxiety    Pregnancy    History of opioid abuse    Posture abnormality    Weakness of both hips    Pelvic pain in pregnancy    COVID-19 affecting pregnancy, antepartum        Presentation: Vertex  Estimated Fetal Weight: 7lbs 0oz    Birth Center Risk Assessment: 1-management on labor and Delivery d/t IOL    CNM management in ABC  CNM management on L&D  Consultation with OB to develop  plan of care  Collaborative CNM/OB management with delivery on L&D   4- Permanent referral of care to MD    "

## 2022-01-27 NOTE — PROGRESS NOTES
LABOR NOTE    S:  Pt sitting in bed. Using nitrous oxide and states that this is helping her to cope with her contractions by helping to ease her anxiety about the contractions. Delfino is present and very supportive and responsive to Bhargavi.     O: /61   Pulse 70   Temp 98 °F (36.7 °C) (Oral)   Resp 20   LMP 2021 (Exact Date)   SpO2 97%   Breastfeeding No     GENERAL: Calm and appropriate affect  NEURO: Alert, oriented, normal speech  ABDOMEN: Nontender, Fundus palpates soft between UC's.  FHT: Baseline 140bpm, moderate variability, positive accels, no decels. Cat 1, reassuring.  CTX: q 2-3 minutes  SVE: 4/70/-3 intact, cephalic, soft, posterior      ASSESSMENT:   26 y.o.  IUP at 41w2d, FHT reassuring/ Cat 1    Patient Active Problem List   Diagnosis    BIpolar 2 hx/ at risk pp depression/ anxiety    Pregnancy    History of opioid abuse    Posture abnormality    Weakness of both hips    Pelvic pain in pregnancy    COVID-19 affecting pregnancy, antepartum    Encounter for planned induction of labor         PLAN:  Continue close Maternal/Fetal monitoring  Pitocin Augmentation per protocol - currently infusing at 6mu  Maintain continuous EFM and toco while being given IOL medications  Okay to labor in tub if desired  Encouraged rest when able, mobility  Labor support / anesthesia PRN  Reporting to LUCIA Begum CNM at 0700

## 2022-01-27 NOTE — PROGRESS NOTES
[facility] - [facility] Delivery  Obstetrics  Labor Progress Note    Patient Name: Bhargavi Hitchcock  MRN: [id number]  Admission Date: 2022  Hospital Length of Stay: 1 days  Attending Physician: Rupali Henry MD  Primary Care Provider: Primary Doctor No    Subjective:     Principal Problem:Encounter for planned induction of labor    Interval History:  Bhargavi is a 26 y.o.  at 41w2d. She is doing well. Epidural in place comfortable    Objective:     Vital Signs (Most Recent):  Temp: 98 °F (36.7 °C) (22 0515)  Pulse: 75 (22 0900)  Resp: 20 (22 0642)  BP: 118/61 (22 0642)  SpO2: 97 % (22 0900) Vital Signs (24h Range):  Temp:  [97.6 °F (36.4 °C)-98.3 °F (36.8 °C)] 98 °F (36.7 °C)  Pulse:  [60-79] 75  Resp:  [16-20] 20  SpO2:  [96 %-99 %] 97 %  BP: (113-127)/(58-72) 118/61        There is no height or weight on file to calculate BMI.    FHT: 140sCat 1 (reassuring)  TOCO:  Q 4-5 minutes    Physical Exam    Cervical Exam:  Dilation:  4-5  Effacement:  80  Station: -3  Presentation: Vertex     Significant Labs:  Lab Results   Component Value Date    GROUPTRH O POS 2022    HEPBSAG Negative 2021    STREPBCULT No Group B Streptococcus isolated 2021       I have personallly reviewed all pertinent lab results from the last 24 hours.    Assessment/Plan:     26 y.o. female  at 41w2d for:    Active Diagnoses:    Diagnosis Date Noted POA    PRINCIPAL PROBLEM:  Encounter for planned induction of labor [Z34.90] 2022 Not Applicable    BIpolar 2 hx/ at risk pp depression/ anxiety [Z86.59] 2021 Not Applicable    History of opioid abuse [F11.11] 2021 Yes      Problems Resolved During this Admission:       continue pitocin  Reevaluate prn    Jennifer Begum CNM  Obstetrics  [facility]
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
## 2022-01-27 NOTE — PROGRESS NOTES
[facility] - [facility] Delivery  Obstetrics  Labor Progress Note    Patient Name: Bhargavi Hitchcock  MRN: [id number]  Admission Date: 2022  Hospital Length of Stay: 1 days  Attending Physician: Rupali Henry MD  Primary Care Provider: Primary Doctor No    Subjective:     Principal Problem:Encounter for planned induction of labor    Interval History:  Bhargavi is a 26 y.o.  at 41w2d. She is doing well. Delfino is @ bedside  Both are asleep    Objective:     Vital Signs (Most Recent):  Temp: 97.9 °F (36.6 °C) (22 1200)  Pulse: 71 (22 1336)  Resp: 20 (22 0642)  BP: (!) 109/57 (22 1321)  SpO2: 97 % (22 1336) Vital Signs (24h Range):  Temp:  [97.6 °F (36.4 °C)-98.3 °F (36.8 °C)] 97.9 °F (36.6 °C)  Pulse:  [] 71  Resp:  [16-20] 20  SpO2:  [92 %-100 %] 97 %  BP: ()/(52-72) 109/57        There is no height or weight on file to calculate BMI.    FHT: 140'sCat 1 (reassuring)  TOCO:  Q occasional minutes    Physical Exam    Cervical Exam:  Dilation:  6  Effacement:  80%  Station: -3  Presentation: Vertex  Bulging bag     Significant Labs:  Lab Results   Component Value Date    GROUPTRH O POS 2022    HEPBSAG Negative 2021    STREPBCULT No Group B Streptococcus isolated 2021       I have personallly reviewed all pertinent lab results from the last 24 hours.    Assessment/Plan:     26 y.o. female  at 41w2d for:    Active Diagnoses:    Diagnosis Date Noted POA    PRINCIPAL PROBLEM:  Encounter for planned induction of labor [Z34.90] 2022 Not Applicable    BIpolar 2 hx/ at risk pp depression/ anxiety [Z86.59] 2021 Not Applicable    History of opioid abuse [F11.11] 2021 Yes      Problems Resolved During this Admission:     continue nona Begum CNM  Obstetrics  [facility] - [facility]

## 2022-01-27 NOTE — PROGRESS NOTES
"LABOR NOTE    S:  Pt resting in bed. States that her lower back hurts and she feels like the baby is "denice side up" (OP). She states that she's trying to rest, but is having some difficulty resting due to contractions. Delfino is present and supportive. Bhargavi consents to SVE.    O: /67 (BP Location: Right arm, Patient Position: Lying)   Pulse 66   Temp 97.6 °F (36.4 °C) (Oral)   Resp 20   LMP 2021 (Exact Date)   SpO2 97%   Breastfeeding No     GENERAL: Calm and appropriate affect  NEURO: Alert, oriented, normal speech  ABDOMEN: Nontender, Fundus palpates soft between UC's.  FHT: Baseline 130bpm, moderate variability, positive accels, no decels. Cat 1, reassuring.  CTX: q 2-5 minutes  SVE: 3/70/-3 soft, posterior, cephalic, intact. Fournier balloon came out at this time.      ASSESSMENT:   26 y.o.  IUP at 41w1d, FHT reassuring/ Cat 1    Patient Active Problem List   Diagnosis    BIpolar 2 hx/ at risk pp depression/ anxiety    Pregnancy    History of opioid abuse    Posture abnormality    Weakness of both hips    Pelvic pain in pregnancy    COVID-19 affecting pregnancy, antepartum    Encounter for planned induction of labor         PLAN:  Continue close Maternal/Fetal monitoring  Maintain continuous EFM and toco while being given IOL medications  Recommended proceeding with pitocin augmentation per protocol. Discussed r/b/a and patient agrees to proceed with pitocin augmentation per protocol. Orders placed. RN notified.  Okay to labor in tub if desired  Encouraged rest when able, mobility  Labor support / anesthesia PRN        "

## 2022-01-28 PROCEDURE — 25000003 PHARM REV CODE 250: Performed by: ADVANCED PRACTICE MIDWIFE

## 2022-01-28 PROCEDURE — 25000003 PHARM REV CODE 250: Performed by: MIDWIFE

## 2022-01-28 PROCEDURE — 11000001 HC ACUTE MED/SURG PRIVATE ROOM

## 2022-01-28 RX ORDER — IBUPROFEN 600 MG/1
600 TABLET ORAL EVERY 6 HOURS PRN
Status: DISCONTINUED | OUTPATIENT
Start: 2022-01-28 | End: 2022-01-28

## 2022-01-28 RX ORDER — IBUPROFEN 600 MG/1
600 TABLET ORAL EVERY 6 HOURS PRN
Status: DISCONTINUED | OUTPATIENT
Start: 2022-01-28 | End: 2022-01-29 | Stop reason: HOSPADM

## 2022-01-28 RX ADMIN — IBUPROFEN 600 MG: 600 TABLET ORAL at 10:01

## 2022-01-28 RX ADMIN — ACETAMINOPHEN 650 MG: 325 TABLET, FILM COATED ORAL at 09:01

## 2022-01-28 RX ADMIN — ACETAMINOPHEN 650 MG: 325 TABLET, FILM COATED ORAL at 07:01

## 2022-01-28 RX ADMIN — DOCUSATE SODIUM 200 MG: 100 CAPSULE ORAL at 09:01

## 2022-01-28 RX ADMIN — HYDROCORTISONE: 25 CREAM TOPICAL at 10:01

## 2022-01-28 RX ADMIN — Medication: at 09:01

## 2022-01-28 RX ADMIN — ACETAMINOPHEN 650 MG: 325 TABLET, FILM COATED ORAL at 01:01

## 2022-01-28 RX ADMIN — ACETAMINOPHEN 650 MG: 325 TABLET, FILM COATED ORAL at 04:01

## 2022-01-28 RX ADMIN — PRENATAL VIT W/ FE FUMARATE-FA TAB 27-0.8 MG 1 TABLET: 27-0.8 TAB at 09:01

## 2022-01-28 NOTE — ANESTHESIA POSTPROCEDURE EVALUATION
Anesthesia Post Evaluation    Patient: Bhargavi Hitchcock    Procedure(s) Performed: * No procedures listed *    Final Anesthesia Type: epidural      Patient location during evaluation: floor  Patient participation: Yes- Able to Participate  Level of consciousness: awake and alert, awake and oriented  Post-procedure vital signs: reviewed and stable  Pain management: adequate  Airway patency: patent  DEONTE mitigation strategies: Multimodal analgesia  PONV status at discharge: No PONV  Anesthetic complications: no      Cardiovascular status: stable, hemodynamically stable and blood pressure returned to baseline  Respiratory status: room air, unassisted and spontaneous ventilation  Hydration status: euvolemic  Follow-up not needed.          Vitals Value Taken Time   BP 94/52 01/27/22 2345   Temp 37.1 °C (98.7 °F) 01/27/22 2345   Pulse 68 01/27/22 2345   Resp 16 01/27/22 2345   SpO2 98 % 01/27/22 2345         No case tracking events are documented in the log.      Pain/Kelsea Score: Pain Rating Prior to Med Admin: 4 (1/28/2022  7:23 AM)  Pain Rating Post Med Admin: 4 (1/27/2022 11:45 AM)

## 2022-01-28 NOTE — PROGRESS NOTES
[facility]  Obstetrics  Labor Progress Note    Patient Name: Bhargavi Hitchcock  MRN: [id number]  Admission Date: 2022  Hospital Length of Stay: 1 days  Attending Physician: Rupali Henry MD  Primary Care Provider: Primary Doctor No    Subjective:     Principal Problem:Encounter for planned induction of labor    Interval History:  Bhargavi is a 26 y.o.  at 41w2d. She is doing well. Very comfortable with epidural  Excited about delivery.    Objective:     Vital Signs (Most Recent):  Temp: 98.4 °F (36.9 °C) (22 1743)  Pulse: 72 (22 1543)  Resp: 20 (22 0642)  BP: (!) 91/49 (22 1534)  SpO2: 95 % (22 1542) Vital Signs (24h Range):  Temp:  [97.6 °F (36.4 °C)-98.4 °F (36.9 °C)] 98.4 °F (36.9 °C)  Pulse:  [] 72  Resp:  [16-20] 20  SpO2:  [92 %-100 %] 95 %  BP: ()/(49-72) 91/49        There is no height or weight on file to calculate BMI.    FHT: 130'sCat 1 (reassuring)  TOCO:  Q 3-5 minutes    Physical Exam    Cervical Exam:  Dilation:  9 ant lip  Effacement:  100%  Station: -2  Presentation: Vertex     Significant Labs:  Lab Results   Component Value Date    GROUPTRH O POS 2022    HEPBSAG Negative 2021    STREPBCULT No Group B Streptococcus isolated 2021       I have personallly reviewed all pertinent lab results from the last 24 hours.    Assessment/Plan:     26 y.o. female  at 41w2d for:    Active Diagnoses:    Diagnosis Date Noted POA    PRINCIPAL PROBLEM:  Encounter for planned induction of labor [Z34.90] 2022 Not Applicable    BIpolar 2 hx/ at risk pp depression/ anxiety [Z86.59] 2021 Not Applicable    History of opioid abuse [F11.11] 2021 Yes      Problems Resolved During this Admission:     Continue pitocin  Reassess prn    Jennifer Begum CNM  Obstetrics  [facility]

## 2022-01-28 NOTE — PLAN OF CARE
Pt ambulating and voiding without difficulty. Patient safety maintained, side rails up, bed low and locked position.  Pain well controlled with PRN pain medication. Fundus midline, firm, with moderate lochia. VSS. Significant other at bedside; parents responding to infant cues and bonding appropriately. Will continue to monitor and intervene as necessary.

## 2022-01-28 NOTE — PLAN OF CARE
Pt is doing well this afternoon. Pt is ambulating, voiding and passing flatus. Pt's pain has been well controlled by oral pain medication. VSS. Pt in stable condition.

## 2022-01-28 NOTE — PROGRESS NOTES
01/28/22 1457   Maternal Assessment   Breast Shape Bilateral:;round   Breast Density Bilateral:;soft   Areola Bilateral:;elastic   Nipples Bilateral:;everted   Maternal Infant Feeding   Maternal Emotional State assist needed   Infant Positioning cross-cradle;clutch/football   Signs of Milk Transfer audible swallow;infant jaw motion present   Latch Assistance yes   Assisted pt with position and latch.  Baby latches to breast easily. Good tugs and pulls observed. Pt and fob shown how to keep baby actively breastfeeding using breast compression and stimulation. Breastfeeding education provided. Questions answered. Skin to skin encouraged.

## 2022-01-28 NOTE — L&D DELIVERY NOTE
"[facility] - [facility] Delivery  Vaginal Delivery   Operative Note    SUMMARY     Normal spontaneous vaginal delivery of live infant, was placed on mothers abdomen for skin to skin and bulb suctioning performed.  Infant delivered position PRIMO over perineum.  Nuchal cord: No.    Spontaneous delivery of placenta and IV pitocin given noting good uterine tone.  Bilateral labial lacerations noted and repaired in the usual fashion.  Patient tolerated delivery well. Sponge needle and lap counted correctly x2.    Indications: Encounter for planned induction of labor  Pregnancy complicated by:   Patient Active Problem List   Diagnosis    BIpolar 2 hx/ at risk pp depression/ anxiety    Pregnancy    History of opioid abuse    Posture abnormality    Weakness of both hips    Pelvic pain in pregnancy    COVID-19 affecting pregnancy, antepartum    Encounter for planned induction of labor     Admitting GA: 41w2d    Delivery Information for Chris Hitchcock    Birth information:  YOB: 2022   Time of birth: 8:39 PM   Sex: female   Head Delivery Date/Time: 1/27/2022  8:39 PM   Delivery type: Vaginal, Spontaneous   Gestational Age: 41w2d    Delivery Providers    Delivering clinician: Blake Bolden CNM   Provider Role    Rupali Dominguez RN Registered Nurse    Lili Schroeder RN Registered Nurse            Measurements    Weight: 4040 g  Weight (lbs): 8 lb 14.5 oz  Length: 53.3 cm  Length (in): 21"  Head circumference: 37.5 cm  Chest circumference: 35.6 cm         Apgars    Living status: Living  Apgars:  1 min.:  5 min.:  10 min.:  15 min.:  20 min.:    Skin color:  0  1       Heart rate:  2  2       Reflex irritability:  2  2       Muscle tone:  2  2       Respiratory effort:  2  2       Total:  8  9       Apgars assigned by: JUSTO SCHROEDER RN         Operative Delivery    Forceps attempted?: No  Vacuum extractor attempted?: No         Shoulder Dystocia    Shoulder dystocia present?: No           Presentation  "   Presentation: Vertex  Position: Left Occiput Anterior           Interventions/Resuscitation    Method: Tactile Stimulation       Cord    Vessels: 3 vessels  Complications: None  Delayed Cord Clamping?: Yes  Cord Clamped Date/Time: 2022  8:45 PM  Cord Blood Disposition: Sent with Baby  Gases Sent?: No  Stem Cell Collection (by MD): No       Placenta    Placenta delivery date/time: 2022  Placenta removal: Spontaneous  Placenta appearance: Intact  Placenta disposition: discarded           Labor Events:       labor: No     Labor Onset Date/Time:         Dilation Complete Date/Time: 2022 19:30     Start Pushing Date/Time: 2022 19:38       Start Pushing Date/Time: 2022 19:38     Rupture Date/Time: 22         Rupture type:          Fluid Amount:       Fluid Color: Clear      Fluid Odor:       Membrane Status: SRM (Spontaneous Rupture)               steroids:       Antibiotics given for GBS:       Induction: balloon dilation (Fournier);misoprostol     Indications for induction:  Post-term Gestation     Augmentation:       Indications for augmentation:       Labor complications: None     Additional complications:          Cervical ripening:                     Delivery:      Episiotomy: None     Indication for Episiotomy:       Perineal Lacerations: None Repaired:      Periurethral Laceration:   Repaired:     Labial Laceration: bilateral Repaired: Yes   Sulcus Laceration:   Repaired:     Vaginal Laceration:   Repaired:     Cervical Laceration:   Repaired:     Repair suture:       Repair # of packets: 2     Last Value - EBL - Nursing (mL):       Sum - EBL - Nursing (mL): 0     Last Value - EBL - Anesthesia (mL):      Calculated QBL (mL): 489      Vaginal Sweep Performed: Yes     Surgicount Correct: Yes       Other providers:       Anesthesia    Method: Epidural          Details (if applicable):  Trial of Labor      Categorization:       Priority:     Indications for :     Incision Type:       Additional  information:  Forceps:    Vacuum:    Breech:    Observed anomalies    Other (Comments):         Blake Bolden CNM

## 2022-01-28 NOTE — PROGRESS NOTES
RN offered social work to patient for additional resources. Pt stated that she did not need additional resources for support at this time.

## 2022-01-29 VITALS
RESPIRATION RATE: 16 BRPM | BODY MASS INDEX: 40.46 KG/M2 | WEIGHT: 214.31 LBS | SYSTOLIC BLOOD PRESSURE: 110 MMHG | TEMPERATURE: 99 F | HEIGHT: 61 IN | HEART RATE: 61 BPM | DIASTOLIC BLOOD PRESSURE: 65 MMHG | OXYGEN SATURATION: 97 %

## 2022-01-29 PROCEDURE — 25000003 PHARM REV CODE 250: Performed by: MIDWIFE

## 2022-01-29 PROCEDURE — 25000003 PHARM REV CODE 250: Performed by: ADVANCED PRACTICE MIDWIFE

## 2022-01-29 RX ADMIN — IBUPROFEN 600 MG: 600 TABLET ORAL at 03:01

## 2022-01-29 RX ADMIN — ACETAMINOPHEN 650 MG: 325 TABLET, FILM COATED ORAL at 01:01

## 2022-01-29 RX ADMIN — ACETAMINOPHEN 650 MG: 325 TABLET, FILM COATED ORAL at 04:01

## 2022-01-29 RX ADMIN — DOCUSATE SODIUM 200 MG: 100 CAPSULE ORAL at 08:01

## 2022-01-29 RX ADMIN — PRENATAL VIT W/ FE FUMARATE-FA TAB 27-0.8 MG 1 TABLET: 27-0.8 TAB at 08:01

## 2022-01-29 RX ADMIN — IBUPROFEN 600 MG: 600 TABLET ORAL at 10:01

## 2022-01-29 NOTE — PLAN OF CARE
VSS. Patient ambulating and voiding independently. Fundus firm with moderate amount of lochia noted. Breastfeeding on demand. Tolerating well.  at bedside participating in care. Bonding well with infant. Safety environment maintained. Side rails up X2. Will continue to monitor.

## 2022-01-29 NOTE — PROGRESS NOTES
[facility] ([place])  Obstetrics  Postpartum Progress Note    Patient Name: Bhargavi Hitchcock  MRN: [id number]  Admission Date: 2022  Hospital Length of Stay: 2 days  Attending Physician: Rupali Henry MD  Primary Care Provider: Primary Doctor No    Subjective:     Principal Problem: (normal spontaneous vaginal delivery)    Hospital Course:   1.5-3 intact, medium, cephalic, posterior   Fournier balloon inserted (cytotec 50mg PO to follow once approved by pharmacy)  2340 3/70/-3. Fournier balloon fell out. Patient agrees to start pitocin per protocol.    PPD#1- nl involution. See note      Interval History: PPD#1    She is doing well this morning. She is tolerating a regular diet without nausea or vomiting. She is voiding spontaneously. She is ambulating. She has passed flatus, and has not a BM. Vaginal bleeding is mild- moderate. She denies fever or chills. Denies abdominal  and controlled with oral medications. She Is breastfeeding.     Objective:     Vital Signs (Most Recent):  Temp: 97.7 °F (36.5 °C) (22)  Pulse: 65 (22)  Resp: 18 (22)  BP: (!) 107/58 (22)  SpO2: 97 % (22) Vital Signs (24h Range):  Temp:  [97.7 °F (36.5 °C)-98.7 °F (37.1 °C)] 97.7 °F (36.5 °C)  Pulse:  [65-71] 65  Resp:  [16-18] 18  SpO2:  [96 %-98 %] 97 %  BP: ()/(52-58) 107/58     Weight: 97.2 kg (214 lb 4.6 oz)  Body mass index is 40.49 kg/m².      Intake/Output Summary (Last 24 hours) at 2022  Last data filed at 2022 0700  Gross per 24 hour   Intake 3587.37 ml   Output 2389 ml   Net 1198.37 ml         Significant Labs:  Lab Results   Component Value Date    GROUPTRH O POS 2022    HEPBSAG Negative 2021    STREPBCULT No Group B Streptococcus isolated 2021     No results for input(s): HGB, HCT in the last 48 hours.    I have personallly reviewed all pertinent lab results from the last 24 hours.    Physical Exam:   Constitutional: She is  oriented to person, place, and time. She appears well-developed and well-nourished.    HENT:   Head: Normocephalic.    Eyes: EOM are normal.     Cardiovascular: Normal rate.     Pulmonary/Chest: Effort normal. No respiratory distress.        Abdominal: Soft. She exhibits no distension.   FF @ U, Midline, non- tender     Genitourinary:    Genitourinary Comments: Perineum intact, bilat. Lac minimal pain when voiding only- approximated             Musculoskeletal: Normal range of motion. No tenderness.       Neurological: She is alert and oriented to person, place, and time.    Skin: Skin is warm and dry.    Psychiatric: She has a normal mood and affect. Her behavior is normal.       Assessment/Plan:     26 y.o. female  for:    Encounter for planned induction of labor  Admit to L&D for IOL due to pregnancy at 41w1d  COVID test negative  Draw CBC and T&S  Start indwelling IV  GBS negative  1820 SVE 1.5/70/-3 intact, medium, cephalic, posterior. Discussed induction methods with Bhargavi and she desires to begin with Fournier balloon and 50mcg cytotec PO. Orders placed.  184 Fournier balloon inserted and inflated with 30mL sterile saline without complication. Patient tolerated well.  Maintain continuous EFM and toco while IOL medications are being used (okay to use portable continuous monitors)  Mobility, rest when possible, and PO hydration encouraged  Labor support / anesthesia PRN    History of opioid abuse  Avoid administering opioids    BIpolar 2 hx/ at risk pp depression/ anxiety  Close PP follow up  2 week postpartum mood check        Disposition: As patient meets milestones, will plan to discharge tomorrow.    Wendy D Gerhardt, CNM  Obstetrics  [facility] ([place])

## 2022-01-29 NOTE — SUBJECTIVE & OBJECTIVE
Interval History: PPD#1    She is doing well this morning. She is tolerating a regular diet without nausea or vomiting. She is voiding spontaneously. She is ambulating. She has passed flatus, and has not a BM. Vaginal bleeding is mild- moderate. She denies fever or chills. Denies abdominal  and controlled with oral medications. She Is breastfeeding.     Objective:     Vital Signs (Most Recent):  Temp: 97.7 °F (36.5 °C) (01/28/22 1643)  Pulse: 65 (01/28/22 1643)  Resp: 18 (01/28/22 1643)  BP: (!) 107/58 (01/28/22 1643)  SpO2: 97 % (01/28/22 1643) Vital Signs (24h Range):  Temp:  [97.7 °F (36.5 °C)-98.7 °F (37.1 °C)] 97.7 °F (36.5 °C)  Pulse:  [65-71] 65  Resp:  [16-18] 18  SpO2:  [96 %-98 %] 97 %  BP: ()/(52-58) 107/58     Weight: 97.2 kg (214 lb 4.6 oz)  Body mass index is 40.49 kg/m².      Intake/Output Summary (Last 24 hours) at 1/28/2022 2049  Last data filed at 1/28/2022 0700  Gross per 24 hour   Intake 3587.37 ml   Output 2389 ml   Net 1198.37 ml         Significant Labs:  Lab Results   Component Value Date    GROUPTRH O POS 01/26/2022    HEPBSAG Negative 06/02/2021    STREPBCULT No Group B Streptococcus isolated 12/22/2021     No results for input(s): HGB, HCT in the last 48 hours.    I have personallly reviewed all pertinent lab results from the last 24 hours.    Physical Exam:   Constitutional: She is oriented to person, place, and time. She appears well-developed and well-nourished.    HENT:   Head: Normocephalic.    Eyes: EOM are normal.     Cardiovascular: Normal rate.     Pulmonary/Chest: Effort normal. No respiratory distress.        Abdominal: Soft. She exhibits no distension.   FF @ U, Midline, non- tender     Genitourinary:    Genitourinary Comments: Perineum intact, bilat. Lac minimal pain when voiding only- approximated             Musculoskeletal: Normal range of motion. No tenderness.       Neurological: She is alert and oriented to person, place, and time.    Skin: Skin is warm and dry.     Psychiatric: She has a normal mood and affect. Her behavior is normal.

## 2022-01-29 NOTE — LACTATION NOTE
01/29/22 0900   Maternal Assessment   Breast Shape round;Left:   Breast Density Left:;soft   Areola Left:;elastic   Nipples Left:;everted   Maternal Infant Feeding   Maternal Emotional State independent;relaxed   Infant Positioning cradle   Signs of Milk Transfer infant jaw motion present   Lactation Referrals   Lactation Referrals outpatient lactation program  (community resources)   Pt has baby latch to breast in cradle hold. Good tugs and pulls observed. Discharge breastfeeding education provided. Questions answered. Pt had lactation contact number and community resources.

## 2022-01-29 NOTE — DISCHARGE SUMMARY
"[facility]  Obstetrics  Discharge Summary      Patient Name: Bhargavi Hitchcock  MRN: [id number]  Admission Date: 2022  Hospital Length of Stay: 3 days  Discharge Date and Time:  2022 11:55 AM  Attending Physician: Rupali Henry MD   Discharging Provider: Sumi Ware CNM   Primary Care Provider: Primary Doctor No    HPI:   History of Present Illness:   Bhargavi Hitchcock is a 26 y.o. female  at 41w1d with Estimated Date of Delivery: 22 who presents to Labor and Delivery accompanied by her  "Delfino", and her mother. Expecting a girl! Bhargavi is here for a scheduled IOL due to being 41w1d gestation.    Mild irregular contractions, active fetal movement, No vaginal bleeding , No loss of fluid.       This pregnancy has been complicated by   Patient Active Problem List   Diagnosis    BIpolar 2 hx/ at risk pp depression/ anxiety    Pregnancy    History of opioid abuse    Posture abnormality    Weakness of both hips    Pelvic pain in pregnancy    COVID-19 affecting pregnancy, antepartum        Presentation: Vertex  Estimated Fetal Weight: 7lbs 0oz    Birth Center Risk Assessment: 1-management on labor and Delivery d/t IOL    0- CNM management in ABC  1- CNM management on L&D  2- Consultation with OB to develop  plan of care  3- Collaborative CNM/OB management with delivery on L&D   4- Permanent referral of care to MD            * No surgery found *     Hospital Course:    1.5-3 intact, medium, cephalic, posterior  1842 Fournier balloon inserted (cytotec 50mg PO to follow once approved by pharmacy)  0 3/70/-3. Fournier balloon fell out. Patient agrees to start pitocin per protocol.    PPD#1- nl involution. See note  PPD #2-doing well breastfeeding, ready for d/c home. Hx of bipolar d/o (hypomania) no manic episodes--educated on importance of sleep and close f/u by mental health team.            Final Active Diagnoses:    Diagnosis Date Noted POA    PRINCIPAL PROBLEM:   " (normal spontaneous vaginal delivery) [O80] 01/27/2022 Not Applicable    Encounter for planned induction of labor [Z34.90] 01/26/2022 Not Applicable    BIpolar 2 hx/ at risk pp depression/ anxiety [Z86.59] 06/30/2021 Not Applicable    History of opioid abuse [F11.11] 06/30/2021 Yes      Problems Resolved During this Admission:        Significant Diagnostic Studies: Labs: CMP No results for input(s): NA, K, CL, CO2, GLU, BUN, CREATININE, CALCIUM, PROT, ALBUMIN, BILITOT, ALKPHOS, AST, ALT, ANIONGAP, ESTGFRAFRICA, EGFRNONAA in the last 48 hours.      Feeding Method: breast    Immunizations     Date Immunization Status Dose Route/Site Given by    01/29/22 0800 MMR Deferred 0.5 mL Subcutaneous/ Marlen Montero RN    01/29/22 0801 Tdap Deferred 0.5 mL Intramuscular/ Marlen Montero RN          Delivery:    Episiotomy: None   Lacerations: None   Repair suture:     Repair # of packets: 2   Blood loss (ml):       Birth information:  YOB: 2022   Time of birth: 8:39 PM   Sex: female   Delivery type: Vaginal, Spontaneous   Gestational Age: 41w2d    Delivery Clinician:      Other providers:       Additional  information:  Forceps:    Vacuum:    Breech:    Observed anomalies      Living?:           APGARS  One minute Five minutes Ten minutes   Skin color:         Heart rate:         Grimace:         Muscle tone:         Breathing:         Totals: 8  9        Placenta: Delivered:       appearance    Pending Diagnostic Studies:     None          Discharged Condition: good    Disposition: Home or Self Care    Follow Up:    Patient Instructions:      Diet Adult Regular     Notify your health care provider if you experience any of the following:  temperature >100.4     Notify your health care provider if you experience any of the following:  persistent nausea and vomiting or diarrhea     Notify your health care provider if you experience any of the following:  severe uncontrolled pain     Notify your health care provider  if you experience any of the following:  redness, tenderness, or signs of infection (pain, swelling, redness, odor or green/yellow discharge around incision site)     Notify your health care provider if you experience any of the following:  difficulty breathing or increased cough     Notify your health care provider if you experience any of the following:  severe persistent headache     Notify your health care provider if you experience any of the following:  worsening rash     Notify your health care provider if you experience any of the following:  persistent dizziness, light-headedness, or visual disturbances     Notify your health care provider if you experience any of the following:  increased confusion or weakness     Notify your health care provider if you experience any of the following:     Activity as tolerated     Medications:  Current Discharge Medication List      CONTINUE these medications which have NOT CHANGED    Details   cetirizine (ZYRTEC) 10 MG tablet Take 10 mg by mouth daily as needed.      ferrous sulfate 324 mg (65 mg iron) TbEC Take 324 mg by mouth once daily.      magnesium 30 mg Tab Take by mouth once.      omega-3 acid ethyl esters (LOVAZA) 1 gram capsule Take 2 g by mouth 2 (two) times daily.      -IRON-FOLATE-DHA ORAL Take by mouth.      albuterol (PROVENTIL/VENTOLIN HFA) 90 mcg/actuation inhaler Inhale 1-2 puffs into the lungs every 6 (six) hours as needed for Wheezing. Rescue  Qty: 1 Inhaler, Refills: 0            Follow-up Information     [facility] In 2 weeks.    Specialty: Obstetrics and Gynecology  Why: 2 week virtual f/u (note to staff to assist scheduling)  Contact information:  [address]  [facility] [address] [phone]  Additional information:  [facility] ([facility]) 4th Floor   Please park in [place] [place] and use [facility] elevators                       Sumi Ware  [facility]  Obstetrics  [facility]

## 2022-01-29 NOTE — PROGRESS NOTES
Depression education given with handout, pt states understanding and will follow up with the walk in clinic for a mood check in 2 weeks

## 2022-02-04 ENCOUNTER — PATIENT MESSAGE (OUTPATIENT)
Dept: REHABILITATION | Facility: OTHER | Age: 27
End: 2022-02-04
Payer: COMMERCIAL

## 2022-02-10 ENCOUNTER — OFFICE VISIT (OUTPATIENT)
Dept: OBSTETRICS AND GYNECOLOGY | Facility: CLINIC | Age: 27
End: 2022-02-10
Payer: COMMERCIAL

## 2022-02-10 DIAGNOSIS — Z86.59 HISTORY OF BIPOLAR DISORDER: Primary | ICD-10-CM

## 2022-02-10 DIAGNOSIS — R45.86 MOOD CHANGE: ICD-10-CM

## 2022-02-10 PROCEDURE — 99213 OFFICE O/P EST LOW 20 MIN: CPT | Mod: 95,,, | Performed by: ADVANCED PRACTICE MIDWIFE

## 2022-02-10 PROCEDURE — 99213 PR OFFICE/OUTPT VISIT, EST, LEVL III, 20-29 MIN: ICD-10-PCS | Mod: 95,,, | Performed by: ADVANCED PRACTICE MIDWIFE
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
## 2022-02-20 ENCOUNTER — PATIENT MESSAGE (OUTPATIENT)
Dept: OBSTETRICS AND GYNECOLOGY | Facility: CLINIC | Age: 27
End: 2022-02-20
Payer: COMMERCIAL

## 2022-02-21 ENCOUNTER — IMMUNIZATION (OUTPATIENT)
Dept: PHARMACY | Facility: CLINIC | Age: 27
End: 2022-02-21
Payer: COMMERCIAL

## 2022-02-21 DIAGNOSIS — Z23 NEED FOR VACCINATION: Primary | ICD-10-CM

## 2022-02-22 ENCOUNTER — PATIENT MESSAGE (OUTPATIENT)
Dept: OBSTETRICS AND GYNECOLOGY | Facility: CLINIC | Age: 27
End: 2022-02-22

## 2022-02-22 ENCOUNTER — OFFICE VISIT (OUTPATIENT)
Dept: OBSTETRICS AND GYNECOLOGY | Facility: CLINIC | Age: 27
End: 2022-02-22
Payer: COMMERCIAL

## 2022-02-22 VITALS
HEIGHT: 61 IN | BODY MASS INDEX: 34.78 KG/M2 | SYSTOLIC BLOOD PRESSURE: 120 MMHG | DIASTOLIC BLOOD PRESSURE: 68 MMHG | WEIGHT: 184.19 LBS

## 2022-02-22 PROCEDURE — 3008F BODY MASS INDEX DOCD: CPT | Mod: CPTII,S$GLB,, | Performed by: ADVANCED PRACTICE MIDWIFE

## 2022-02-22 PROCEDURE — 99999 PR PBB SHADOW E&M-EST. PATIENT-LVL III: ICD-10-PCS | Mod: PBBFAC,,, | Performed by: ADVANCED PRACTICE MIDWIFE

## 2022-02-22 PROCEDURE — 0503F POSTPARTUM CARE VISIT: CPT | Mod: CPTII,S$GLB,, | Performed by: ADVANCED PRACTICE MIDWIFE

## 2022-02-22 PROCEDURE — 3074F PR MOST RECENT SYSTOLIC BLOOD PRESSURE < 130 MM HG: ICD-10-PCS | Mod: CPTII,S$GLB,, | Performed by: ADVANCED PRACTICE MIDWIFE

## 2022-02-22 PROCEDURE — 0503F PR POSTPARTUM CARE VISIT: ICD-10-PCS | Mod: CPTII,S$GLB,, | Performed by: ADVANCED PRACTICE MIDWIFE

## 2022-02-22 PROCEDURE — 3008F PR BODY MASS INDEX (BMI) DOCUMENTED: ICD-10-PCS | Mod: CPTII,S$GLB,, | Performed by: ADVANCED PRACTICE MIDWIFE

## 2022-02-22 PROCEDURE — 99999 PR PBB SHADOW E&M-EST. PATIENT-LVL III: CPT | Mod: PBBFAC,,, | Performed by: ADVANCED PRACTICE MIDWIFE

## 2022-02-22 PROCEDURE — 3078F DIAST BP <80 MM HG: CPT | Mod: CPTII,S$GLB,, | Performed by: ADVANCED PRACTICE MIDWIFE

## 2022-02-22 PROCEDURE — 3078F PR MOST RECENT DIASTOLIC BLOOD PRESSURE < 80 MM HG: ICD-10-PCS | Mod: CPTII,S$GLB,, | Performed by: ADVANCED PRACTICE MIDWIFE

## 2022-02-22 PROCEDURE — 3074F SYST BP LT 130 MM HG: CPT | Mod: CPTII,S$GLB,, | Performed by: ADVANCED PRACTICE MIDWIFE
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
## 2022-03-05 ENCOUNTER — PATIENT MESSAGE (OUTPATIENT)
Dept: OBSTETRICS AND GYNECOLOGY | Facility: CLINIC | Age: 27
End: 2022-03-05
Payer: COMMERCIAL

## 2022-03-08 ENCOUNTER — POSTPARTUM VISIT (OUTPATIENT)
Dept: OBSTETRICS AND GYNECOLOGY | Facility: CLINIC | Age: 27
End: 2022-03-08
Payer: COMMERCIAL

## 2022-03-08 VITALS
DIASTOLIC BLOOD PRESSURE: 68 MMHG | BODY MASS INDEX: 34.11 KG/M2 | SYSTOLIC BLOOD PRESSURE: 118 MMHG | HEIGHT: 61 IN | WEIGHT: 180.69 LBS

## 2022-03-08 PROCEDURE — 0503F POSTPARTUM CARE VISIT: CPT | Mod: S$GLB,,, | Performed by: ADVANCED PRACTICE MIDWIFE

## 2022-03-08 PROCEDURE — 0503F PR POSTPARTUM CARE VISIT: ICD-10-PCS | Mod: S$GLB,,, | Performed by: ADVANCED PRACTICE MIDWIFE

## 2022-03-08 PROCEDURE — 99999 PR PBB SHADOW E&M-EST. PATIENT-LVL III: CPT | Mod: PBBFAC,,, | Performed by: ADVANCED PRACTICE MIDWIFE

## 2022-03-08 PROCEDURE — 99999 PR PBB SHADOW E&M-EST. PATIENT-LVL III: ICD-10-PCS | Mod: PBBFAC,,, | Performed by: ADVANCED PRACTICE MIDWIFE

## 2022-03-08 NOTE — PROGRESS NOTES
"Subjective:       Bhargavi Hitchcock is a 26 y.o. female who presents for a postpartum visit. She is 5 weeks postpartum following a spontaneous vaginal delivery. I have fully reviewed the prenatal and intrapartum course. The delivery was at 41  gestational weeks. Outcome: spontaneous vaginal delivery. Anesthesia: epidural. Postpartum course has been WNL. Baby's course has been uneventful. Baby is feeding by breast. Bleeding thin lochia. Bowel function is normal. Bladder function is normal. Patient is not sexually active. Contraception method is thinking about IUD. Postpartum depression screening: negative.    The following portions of the patient's history were reviewed and updated as appropriate: She is allergic to ketorolac..    Review of Systems  Pertinent items are noted in HPI.     Objective:      /68   Ht 5' 0.98" (1.549 m)   Wt 81.9 kg (180 lb 10.7 oz)   LMP 04/13/2021 (Exact Date)   Breastfeeding Yes   BMI 34.15 kg/m²    General:  alert, appears stated age and cooperative    Breasts:  inspection negative, no nipple discharge or bleeding, no masses or nodularity palpable and lactating   Lungs: clear to auscultation bilaterally   Heart:  regular rate and rhythm, S1, S2 normal, no murmur, click, rub or gallop and regular rate and rhythm   Abdomen: normal findings: soft, non-tender    Vulva:  normal   Vagina: normal vagina   Cervix:  no cervical motion tenderness   Corpus: normal size, contour, position, consistency, mobility, non-tender   Adnexa:  normal adnexa   Rectal Exam: Not performed.          Assessment:       5 week  postpartum exam. Pap smear not done at today's visit.     Plan:      1. Contraception: thinking about IUD  will call to schedule when she decides.  2. 5 Week PP exam WNL  Breastfeeding  3. Follow up in: 1 year or as needed.     "

## 2022-03-18 ENCOUNTER — PATIENT MESSAGE (OUTPATIENT)
Dept: OBSTETRICS AND GYNECOLOGY | Facility: CLINIC | Age: 27
End: 2022-03-18
Payer: COMMERCIAL

## 2022-05-15 ENCOUNTER — PATIENT MESSAGE (OUTPATIENT)
Dept: OBSTETRICS AND GYNECOLOGY | Facility: CLINIC | Age: 27
End: 2022-05-15
Payer: COMMERCIAL

## 2022-05-16 ENCOUNTER — OFFICE VISIT (OUTPATIENT)
Dept: OBSTETRICS AND GYNECOLOGY | Facility: CLINIC | Age: 27
End: 2022-05-16
Payer: COMMERCIAL

## 2022-05-16 ENCOUNTER — TELEPHONE (OUTPATIENT)
Dept: OBSTETRICS AND GYNECOLOGY | Facility: CLINIC | Age: 27
End: 2022-05-16
Payer: COMMERCIAL

## 2022-05-16 VITALS
HEIGHT: 61 IN | DIASTOLIC BLOOD PRESSURE: 64 MMHG | WEIGHT: 176.5 LBS | BODY MASS INDEX: 33.32 KG/M2 | SYSTOLIC BLOOD PRESSURE: 117 MMHG

## 2022-05-16 DIAGNOSIS — N61.0 MASTITIS, ACUTE: Primary | ICD-10-CM

## 2022-05-16 PROCEDURE — 3078F DIAST BP <80 MM HG: CPT | Mod: CPTII,S$GLB,, | Performed by: MIDWIFE

## 2022-05-16 PROCEDURE — 3074F SYST BP LT 130 MM HG: CPT | Mod: CPTII,S$GLB,, | Performed by: MIDWIFE

## 2022-05-16 PROCEDURE — 99999 PR PBB SHADOW E&M-EST. PATIENT-LVL III: CPT | Mod: PBBFAC,,, | Performed by: MIDWIFE

## 2022-05-16 PROCEDURE — 3074F PR MOST RECENT SYSTOLIC BLOOD PRESSURE < 130 MM HG: ICD-10-PCS | Mod: CPTII,S$GLB,, | Performed by: MIDWIFE

## 2022-05-16 PROCEDURE — 1159F MED LIST DOCD IN RCRD: CPT | Mod: CPTII,S$GLB,, | Performed by: MIDWIFE

## 2022-05-16 PROCEDURE — 3008F PR BODY MASS INDEX (BMI) DOCUMENTED: ICD-10-PCS | Mod: CPTII,S$GLB,, | Performed by: MIDWIFE

## 2022-05-16 PROCEDURE — 99213 PR OFFICE/OUTPT VISIT, EST, LEVL III, 20-29 MIN: ICD-10-PCS | Mod: S$GLB,,, | Performed by: MIDWIFE

## 2022-05-16 PROCEDURE — 99213 OFFICE O/P EST LOW 20 MIN: CPT | Mod: S$GLB,,, | Performed by: MIDWIFE

## 2022-05-16 PROCEDURE — 99999 PR PBB SHADOW E&M-EST. PATIENT-LVL III: ICD-10-PCS | Mod: PBBFAC,,, | Performed by: MIDWIFE

## 2022-05-16 PROCEDURE — 1159F PR MEDICATION LIST DOCUMENTED IN MEDICAL RECORD: ICD-10-PCS | Mod: CPTII,S$GLB,, | Performed by: MIDWIFE

## 2022-05-16 PROCEDURE — 3078F PR MOST RECENT DIASTOLIC BLOOD PRESSURE < 80 MM HG: ICD-10-PCS | Mod: CPTII,S$GLB,, | Performed by: MIDWIFE

## 2022-05-16 PROCEDURE — 3008F BODY MASS INDEX DOCD: CPT | Mod: CPTII,S$GLB,, | Performed by: MIDWIFE

## 2022-05-16 RX ORDER — DICLOXACILLIN SODIUM 250 MG/1
500 CAPSULE ORAL 4 TIMES DAILY
Qty: 80 CAPSULE | Refills: 0 | Status: SHIPPED | OUTPATIENT
Start: 2022-05-16 | End: 2022-05-26

## 2022-05-16 NOTE — TELEPHONE ENCOUNTER
Spoke with patient to schedule appointment. Pt stated she was available tomorrow. I scheduled an appointment at 2:20 May 17,2022(lisa)

## 2022-05-17 NOTE — PROGRESS NOTES
"CC: Breast pain/redness    HPI:  Patient is a 27 yo  who is 3 months PP following  without complications. Here today for urgent visit related to right breast pain. Reports she experienced a crack in her right nipple several nights ago while breastfeeding her . Next day she noticed a firm lump at 2 o'clock in her right breast that was about 1 cm in size and tender to the touch. Used warm compresses, has fed on that side and done gentle massage the past couple days but reports her symptoms have worsened. Lump is now larger in size (apprx 2-3 cm) red, warm to the touch with red streaking over the breast. Denies fever but affirms new onset HA and fatigue past two days.     O:  /64   Ht 5' 1" (1.549 m)   Wt 80 kg (176 lb 7.7 oz)   Breastfeeding Yes   BMI 33.35 kg/m²     A&O x 4  NAD  Respirations unlabored  Physical Exam  Genitourinary:   Breasts:      Right: Mass and tenderness present. No inverted nipple.      Left: No inverted nipple, mass or tenderness.       Chest:           A/P  Acute mastitis of right breast  - Dicloxacillin 500 mg QID x 14 days sent to [facility], patient advised to take as prescribed and finish script  - Comfort care reviewed  - Warning s/sx reviewed, expect improving symptoms within 24 hours. Patient to notify CNMs if no improvement  RTC in 1 year or prn sooner    Patient verbalizes understanding and agrees with plan of care    Blake Bolden CNM      "

## 2022-09-06 ENCOUNTER — OFFICE VISIT (OUTPATIENT)
Dept: OBSTETRICS AND GYNECOLOGY | Facility: CLINIC | Age: 27
End: 2022-09-06
Payer: COMMERCIAL

## 2022-09-06 DIAGNOSIS — F31.81 BIPOLAR 2 DISORDER: ICD-10-CM

## 2022-09-06 DIAGNOSIS — F41.9 ANXIETY DISORDER, UNSPECIFIED TYPE: Primary | ICD-10-CM

## 2022-09-06 DIAGNOSIS — F32.A DEPRESSION, UNSPECIFIED DEPRESSION TYPE: ICD-10-CM

## 2022-09-06 PROBLEM — Z34.90 ENCOUNTER FOR PLANNED INDUCTION OF LABOR: Status: RESOLVED | Noted: 2022-01-26 | Resolved: 2022-09-06

## 2022-09-06 PROBLEM — O26.899 PELVIC PAIN IN PREGNANCY: Status: RESOLVED | Noted: 2021-12-22 | Resolved: 2022-09-06

## 2022-09-06 PROBLEM — R10.2 PELVIC PAIN IN PREGNANCY: Status: RESOLVED | Noted: 2021-12-22 | Resolved: 2022-09-06

## 2022-09-06 PROBLEM — U07.1 COVID-19 AFFECTING PREGNANCY, ANTEPARTUM: Status: RESOLVED | Noted: 2021-12-26 | Resolved: 2022-09-06

## 2022-09-06 PROBLEM — O98.519 COVID-19 AFFECTING PREGNANCY, ANTEPARTUM: Status: RESOLVED | Noted: 2021-12-26 | Resolved: 2022-09-06

## 2022-09-06 PROBLEM — Z34.90 PREGNANCY: Status: RESOLVED | Noted: 2021-06-30 | Resolved: 2022-09-06

## 2022-09-06 PROCEDURE — 99214 OFFICE O/P EST MOD 30 MIN: CPT | Mod: 95,,, | Performed by: ADVANCED PRACTICE MIDWIFE

## 2022-09-06 PROCEDURE — 99214 PR OFFICE/OUTPT VISIT, EST, LEVL IV, 30-39 MIN: ICD-10-PCS | Mod: 95,,, | Performed by: ADVANCED PRACTICE MIDWIFE

## 2022-09-06 PROCEDURE — 1159F PR MEDICATION LIST DOCUMENTED IN MEDICAL RECORD: ICD-10-PCS | Mod: CPTII,95,, | Performed by: ADVANCED PRACTICE MIDWIFE

## 2022-09-06 PROCEDURE — 1159F MED LIST DOCD IN RCRD: CPT | Mod: CPTII,95,, | Performed by: ADVANCED PRACTICE MIDWIFE

## 2022-09-09 ENCOUNTER — TELEPHONE (OUTPATIENT)
Dept: ADMINISTRATIVE | Facility: OTHER | Age: 27
End: 2022-09-09
Payer: COMMERCIAL

## 2022-09-19 ENCOUNTER — OFFICE VISIT (OUTPATIENT)
Dept: PSYCHIATRY | Facility: CLINIC | Age: 27
End: 2022-09-19
Payer: COMMERCIAL

## 2022-09-19 DIAGNOSIS — F43.10 PTSD (POST-TRAUMATIC STRESS DISORDER): ICD-10-CM

## 2022-09-19 DIAGNOSIS — F41.1 GAD (GENERALIZED ANXIETY DISORDER): ICD-10-CM

## 2022-09-19 DIAGNOSIS — F39 MOOD DISORDER: ICD-10-CM

## 2022-09-19 DIAGNOSIS — F33.1 MODERATE EPISODE OF RECURRENT MAJOR DEPRESSIVE DISORDER: Primary | ICD-10-CM

## 2022-09-19 DIAGNOSIS — F11.11 HISTORY OF OPIOID ABUSE: ICD-10-CM

## 2022-09-19 PROCEDURE — 1159F PR MEDICATION LIST DOCUMENTED IN MEDICAL RECORD: ICD-10-PCS | Mod: CPTII,95,, | Performed by: INTERNAL MEDICINE

## 2022-09-19 PROCEDURE — 1160F PR REVIEW ALL MEDS BY PRESCRIBER/CLIN PHARMACIST DOCUMENTED: ICD-10-PCS | Mod: CPTII,95,, | Performed by: INTERNAL MEDICINE

## 2022-09-19 PROCEDURE — 1159F MED LIST DOCD IN RCRD: CPT | Mod: CPTII,95,, | Performed by: INTERNAL MEDICINE

## 2022-09-19 PROCEDURE — 1160F RVW MEDS BY RX/DR IN RCRD: CPT | Mod: CPTII,95,, | Performed by: INTERNAL MEDICINE

## 2022-09-19 PROCEDURE — 90792 PSYCH DIAG EVAL W/MED SRVCS: CPT | Mod: 95,,, | Performed by: INTERNAL MEDICINE

## 2022-09-19 PROCEDURE — 90792 PR PSYCHIATRIC DIAGNOSTIC EVALUATION W/MEDICAL SERVICES: ICD-10-PCS | Mod: 95,,, | Performed by: INTERNAL MEDICINE
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
## 2022-09-23 ENCOUNTER — OFFICE VISIT (OUTPATIENT)
Dept: PSYCHIATRY | Facility: CLINIC | Age: 27
End: 2022-09-23
Payer: COMMERCIAL

## 2022-09-23 DIAGNOSIS — F39 MOOD DISORDER: ICD-10-CM

## 2022-09-23 DIAGNOSIS — F43.10 PTSD (POST-TRAUMATIC STRESS DISORDER): ICD-10-CM

## 2022-09-23 DIAGNOSIS — F33.1 MDD (MAJOR DEPRESSIVE DISORDER), RECURRENT EPISODE, MODERATE: Primary | ICD-10-CM

## 2022-09-23 DIAGNOSIS — F11.11 HISTORY OF OPIOID ABUSE: ICD-10-CM

## 2022-09-23 DIAGNOSIS — F41.9 ANXIETY: ICD-10-CM

## 2022-09-23 PROCEDURE — 99214 PR OFFICE/OUTPT VISIT, EST, LEVL IV, 30-39 MIN: ICD-10-PCS | Mod: 95,,, | Performed by: INTERNAL MEDICINE

## 2022-09-23 PROCEDURE — 90833 PR PSYCHOTHERAPY W/PATIENT W/E&M, 30 MIN (ADD ON): ICD-10-PCS | Mod: 95,,, | Performed by: INTERNAL MEDICINE

## 2022-09-23 PROCEDURE — 90833 PSYTX W PT W E/M 30 MIN: CPT | Mod: 95,,, | Performed by: INTERNAL MEDICINE

## 2022-09-23 PROCEDURE — 99214 OFFICE O/P EST MOD 30 MIN: CPT | Mod: 95,,, | Performed by: INTERNAL MEDICINE

## 2022-09-23 RX ORDER — SERTRALINE HYDROCHLORIDE 25 MG/1
TABLET, FILM COATED ORAL
Qty: 30 TABLET | Refills: 1 | Status: SHIPPED | OUTPATIENT
Start: 2022-09-23 | End: 2022-11-08 | Stop reason: SDUPTHER

## 2022-09-23 NOTE — PROGRESS NOTES
OUTPATIENT PSYCHIATRY RETURN VISIT    ENCOUNTER DATE:  9/23/22   SITE:  [facility], [facility]  LENGTH OF SESSION:  26 minutes    The patient location is:  [place], not in a healthcare facility  Visit type:  audiovisual    Face to Face time with patient:  26 minutes  35 minutes of total time spent on the encounter, which includes face to face time and non-face to face time preparing to see the patient (eg, review of tests), Obtaining and/or reviewing separately obtained history, Documenting clinical information in the electronic or other health record, Independently interpreting results (not separately reported) and communicating results to the patient/family/caregiver, or Care coordination (not separately reported).     Each patient to whom he or she provides medical services by telemedicine is:  (1) informed of the relationship between the physician and patient and the respective role of any other health care provider with respect to management of the patient; and (2) notified that he or she may decline to receive medical services by telemedicine and may withdraw from such care at any time.    CHIEF COMPLAINT:  Mood Swings      HISTORY OF PRESENTING ILLNESS:  Bhargavi Hitchcock is a 26 y.o. female with history of Depression, Anxiety, PTSD, and Mood disorder (R/o borderline personality disorder versus bipolar 2 disorder) who presents for follow up appointment.      Plan at last appointment on 9/19/2022:  Had long discussion regarding potential diagnoses based on today's interview.  Started discussion about potential medications to help her mood(patient is breast-feeding).  Will not start medication today and instead schedule follow up appointment on Friday to continue discussion about medications.  Discussed with patient informed consent, risks versus benefits, alternative treatments, side effect profile and the inherent unpredictability of individual responses to these treatments.  The patient expresses  understanding of the above and displays the capacity to agree with this current plan.  Referral to  therapist.    History as told by patient:  Feels she has no control over her emotions.  Feels things so intensely.   is only stable relationship she has ever had.  So attachment doesn't apply to her anymore.  But everything else for borderline personality disorder that she read did sound like her.  Difficult seeking help and reading about these things because then has to admit to herself that she hasn't been coping.  Was really promiscuous for a very long time.  Did a lot of things she regrets.  Since her life with her  things have stopped and slowed down and this is the most stable she has been.  Denies drug use.  Does want help even though its scary.      Psychotherapy:  Target symptoms: mood swings  Why chosen therapy is appropriate versus another modality: relevant to diagnosis, patient responds to this modality  Outcome monitoring methods: self-report, observation  Therapeutic intervention type: supportive psychotherapy  Topics discussed/themes: building skills sets for symptom management, symptom recognition  The patient's response to the intervention is accepting. The patient's progress toward treatment goals is good.   Duration of intervention: 16 minutes.    Medication side effects:  N/A  Medication compliance:  N/A    PSYCHIATRIC REVIEW OF SYSTEMS:  Trouble with sleep:  Sometimes  Appetite changes:  Denies  Weight changes:  Denies  Lack of energy:  Sometimes  Anhedonia:  Sometimes  Somatic symptoms:  When anxious  Libido:  Not discussed  Anxiety/panic:  Yes  Guilty/hopeless:  Sometimes  Self-injurious behavior/risky behavior:  Denies  Any drugs:  Denies  Alcohol:  Occasional wine  Breastfeeding:  Yes    MEDICAL REVIEW OF SYSTEMS:  Complete review of systems performed covering Constitutional, Musculoskeletal, Neurologic.  All systems negative except for that covered in HPI.    PAST  "PSYCHIATRIC, MEDICAL, AND SOCIAL HISTORY REVIEWED  The patient's past medical, family and social history have been reviewed and updated as appropriate within the electronic medical record - see encounter notes.    MEDICATIONS:    Current Outpatient Medications:     albuterol (PROVENTIL/VENTOLIN HFA) 90 mcg/actuation inhaler, Inhale 1-2 puffs into the lungs every 6 (six) hours as needed for Wheezing. Rescue, Disp: 1 Inhaler, Rfl: 0    cetirizine (ZYRTEC) 10 MG tablet, Take 10 mg by mouth daily as needed., Disp: , Rfl:     ferrous sulfate 324 mg (65 mg iron) TbEC, Take 324 mg by mouth once daily., Disp: , Rfl:     magnesium 30 mg Tab, Take by mouth once., Disp: , Rfl:     omega-3 acid ethyl esters (LOVAZA) 1 gram capsule, Take 2 g by mouth 2 (two) times daily., Disp: , Rfl:     -IRON-FOLATE-DHA ORAL, Take by mouth., Disp: , Rfl:     sertraline (ZOLOFT) 25 MG tablet, Take 1/2 tablet (12.5mg) PO daily x 1 week.  Then increase to 1 tablet (25mg) daily thereafter., Disp: 30 tablet, Rfl: 1    ALLERGIES:  Review of patient's allergies indicates:   Allergen Reactions    Ketorolac Shortness Of Breath       PSYCHIATRIC EXAM:  There were no vitals filed for this visit.  Appearance:  Well groomed, appearing healthy and of stated age  Behavior:  Cooperative, pleasant, no psychomotor agitation or retardation  Speech:  Normal rate, rhythm, prosody, and volume  Mood:  "Emotional"  Affect:  Congruent, tearful at times  Thought Process:  Linear, logical, goal directed  Thought Content:  Negative for suicidal ideation, homicidal ideation, delusions or hallucinations.  Associations:  Intact  Memory:  Grossly Intact  Level of Consciousness/Orientation:  Grossly intact  Fund of Knowledge:  Good  Attention:  Good  Language:  Fluent, able to name abstract and concrete objects  Insight:  Good  Judgment:  Intact  Psychomotor signs:  No abnormal movements of face  Gait:  Unable to assess via virtual visit      RELEVANT LABS/STUDIES:  "   Lab Results   Component Value Date    WBC 11.84 01/26/2022    HGB 14.3 01/26/2022    HCT 41.3 01/26/2022    MCV 87 01/26/2022     01/26/2022     BMP  Lab Results   Component Value Date     10/08/2018    K 3.3 (L) 10/08/2018     10/08/2018    CO2 27 10/08/2018    BUN 9 10/08/2018    CREATININE 0.8 10/08/2018    CALCIUM 9.9 10/08/2018    ANIONGAP 10 10/08/2018    ESTGFRAFRICA >60.0 10/08/2018    EGFRNONAA >60.0 10/08/2018     Lab Results   Component Value Date    ALT 13 01/15/2017    AST 20 01/15/2017    ALKPHOS 46 (L) 01/15/2017    BILITOT 0.2 01/15/2017     Lab Results   Component Value Date    TSH 2.574 01/15/2017     No results found for: LABA1C, HGBA1C    IMPRESSION:    Bhargavi Hitchcock is a 26 y.o. female with history of Depression, Anxiety, PTSD, and Mood disorder (R/o borderline personality disorder versus bipolar 2 disorder) who presents for follow up appointment.      Status/Progress:  Based on the examination today, the patient's problem(s) is/are inadequately controlled.  New problems have not been presented today.     Risk Parameters:  Patient reports no suicidal ideation  Patient reports no homicidal ideation  Patient reports no self-injurious behavior  Patient reports no violent behavior    DIAGNOSES:    ICD-10-CM ICD-9-CM   1. MDD (major depressive disorder), recurrent episode, moderate  F33.1 296.32   2. Anxiety  F41.9 300.00   3. Mood disorder (r/o bipolar 2, r/o borderline personality)  F39 296.90   4. PTSD (post-traumatic stress disorder)  F43.10 309.81   5. History of opioid abuse  F11.11 305.53       PLAN:  Will start very low dose Zoloft 12.5mg daily x 1 week and then increase to 25mg daily.  Likely diagnosis of borderline personality disorder rather than bipolar disorder, however discussed risk of guerrero when starting an SSRI if she does have bipolar disorder.  She agrees to reach out to me.    Discussed with patient informed consent, risks versus benefits (especially while  breastfeeding), alternative treatments, side effect profile and the inherent unpredictability of individual responses to these treatments.  The patient expresses understanding of the above and displays the capacity to agree with this current plan.  Referral to  therapist.    RETURN TO CLINIC:  Follow up in about 2 weeks (around 10/7/2022).

## 2022-09-24 PROBLEM — F41.1 GAD (GENERALIZED ANXIETY DISORDER): Status: ACTIVE | Noted: 2022-09-24

## 2022-09-24 PROBLEM — F33.1 MODERATE EPISODE OF RECURRENT MAJOR DEPRESSIVE DISORDER: Status: ACTIVE | Noted: 2022-09-24

## 2022-09-24 PROBLEM — F39 MOOD DISORDER: Status: ACTIVE | Noted: 2022-09-24

## 2022-09-24 PROBLEM — F43.10 PTSD (POST-TRAUMATIC STRESS DISORDER): Status: ACTIVE | Noted: 2022-09-24

## 2022-09-27 ENCOUNTER — PATIENT MESSAGE (OUTPATIENT)
Dept: PSYCHIATRY | Facility: CLINIC | Age: 27
End: 2022-09-27
Payer: COMMERCIAL

## 2022-09-27 PROBLEM — F41.9 ANXIETY DISORDER: Status: ACTIVE | Noted: 2022-09-27

## 2022-09-27 PROBLEM — F32.A DEPRESSION: Status: ACTIVE | Noted: 2022-09-27

## 2022-09-27 PROBLEM — F31.81 BIPOLAR 2 DISORDER: Status: ACTIVE | Noted: 2022-09-27

## 2022-09-27 PROBLEM — Z86.59 HISTORY OF BIPOLAR DISORDER: Status: RESOLVED | Noted: 2021-06-30 | Resolved: 2022-09-27

## 2022-10-06 ENCOUNTER — PATIENT MESSAGE (OUTPATIENT)
Dept: PSYCHIATRY | Facility: CLINIC | Age: 27
End: 2022-10-06
Payer: COMMERCIAL

## 2022-10-11 ENCOUNTER — OFFICE VISIT (OUTPATIENT)
Dept: PSYCHIATRY | Facility: CLINIC | Age: 27
End: 2022-10-11
Payer: COMMERCIAL

## 2022-10-11 DIAGNOSIS — F43.10 PTSD (POST-TRAUMATIC STRESS DISORDER): ICD-10-CM

## 2022-10-11 DIAGNOSIS — F39 MOOD DISORDER: ICD-10-CM

## 2022-10-11 DIAGNOSIS — F33.1 MODERATE EPISODE OF RECURRENT MAJOR DEPRESSIVE DISORDER: ICD-10-CM

## 2022-10-11 DIAGNOSIS — F41.1 GAD (GENERALIZED ANXIETY DISORDER): Primary | ICD-10-CM

## 2022-10-11 PROCEDURE — 99999 PR PBB SHADOW E&M-EST. PATIENT-LVL II: ICD-10-PCS | Mod: PBBFAC,,, | Performed by: INTERNAL MEDICINE

## 2022-10-11 PROCEDURE — 1159F PR MEDICATION LIST DOCUMENTED IN MEDICAL RECORD: ICD-10-PCS | Mod: CPTII,95,, | Performed by: INTERNAL MEDICINE

## 2022-10-11 PROCEDURE — 99999 PR PBB SHADOW E&M-EST. PATIENT-LVL II: CPT | Mod: PBBFAC,,, | Performed by: INTERNAL MEDICINE

## 2022-10-11 PROCEDURE — 1159F MED LIST DOCD IN RCRD: CPT | Mod: CPTII,95,, | Performed by: INTERNAL MEDICINE

## 2022-10-11 PROCEDURE — 1160F RVW MEDS BY RX/DR IN RCRD: CPT | Mod: CPTII,95,, | Performed by: INTERNAL MEDICINE

## 2022-10-11 PROCEDURE — 99214 OFFICE O/P EST MOD 30 MIN: CPT | Mod: 95,,, | Performed by: INTERNAL MEDICINE

## 2022-10-11 PROCEDURE — 1160F PR REVIEW ALL MEDS BY PRESCRIBER/CLIN PHARMACIST DOCUMENTED: ICD-10-PCS | Mod: CPTII,95,, | Performed by: INTERNAL MEDICINE

## 2022-10-11 PROCEDURE — 99214 PR OFFICE/OUTPT VISIT, EST, LEVL IV, 30-39 MIN: ICD-10-PCS | Mod: 95,,, | Performed by: INTERNAL MEDICINE

## 2022-10-11 NOTE — PROGRESS NOTES
OUTPATIENT PSYCHIATRY RETURN VISIT    ENCOUNTER DATE:  10/11/22   SITE:  [facility], [facility]  LENGTH OF SESSION: 15 minutes    The patient location is:  [place], not in a healthcare facility  Visit type:  audiovisual    Face to Face time with patient:  15 minutes  20 minutes of total time spent on the encounter, which includes face to face time and non-face to face time preparing to see the patient (eg, review of tests), Obtaining and/or reviewing separately obtained history, Documenting clinical information in the electronic or other health record, Independently interpreting results (not separately reported) and communicating results to the patient/family/caregiver, or Care coordination (not separately reported).     Each patient to whom he or she provides medical services by telemedicine is:  (1) informed of the relationship between the physician and patient and the respective role of any other health care provider with respect to management of the patient; and (2) notified that he or she may decline to receive medical services by telemedicine and may withdraw from such care at any time.    CHIEF COMPLAINT:  Mood      HISTORY OF PRESENTING ILLNESS:  Bhargavi Hitchcock is a 26 y.o. female with history of Depression, Anxiety, PTSD, and Mood disorder (R/o borderline personality disorder versus bipolar 2 disorder) who presents for follow up appointment.      Plan at last appointment on 9/23/2022:  Will start very low dose Zoloft 12.5mg daily x 1 week and then increase to 25mg daily.  Likely diagnosis of borderline personality disorder rather than bipolar disorder, however discussed risk of guerrero when starting an SSRI if she does have bipolar disorder.  She agrees to reach out to me.    Discussed with patient informed consent, risks versus benefits (especially while breastfeeding), alternative treatments, side effect profile and the inherent unpredictability of individual responses to these treatments.   "The patient expresses understanding of the above and displays the capacity to agree with this current plan.  Referral to  therapist.    History as told by patient:  Noticed a difference on medication immediately.  Says it was actually tangible.  Things started feeling brighter - visually.  Light got brighter.  This has calmed down in the last 3-4 days though.  Colors were "screaming", lights were bright.  This was overwhelming when it was happening.  Social interactions are better for her.  Went out with a friend last night - didn't feel as depressed or anxious.  Less anxious all the time.  Things don't feel like "they are on fire" or a crisis all the time.  Her anger and anxiety are not happening as often.  But the physical manifestations of anxiety feel like her head is going to explode.  Felt like pressure in her head - this was just one day, it was scary.  Forgot to switch the laundry over and that set her off, she freaked out.  Feels tired, exhausted, hasn't been able to work out.  When she went up to the whole tablet, started clinching her jaw.  Felt foggy later in the evening the same day she increased the dose.  Next day same.  Was on higher dose for 2 days.  Has had a racing heart 3 times when she was going to sleep and then got up to change her daughter.  Almost felt like tight pain for 1 second and then went away.  Was not feeling anxious in that moment.  Happened when she went from lying down to sitting up.  Thinks she is sleeping better and hard to get up in the morning.  Overall feels better on the medication and wishes to continue.    Medication side effects:  As above  Medication compliance:  Yes    PSYCHIATRIC REVIEW OF SYSTEMS:  Trouble with sleep:  Sometimes  Appetite changes:  Denies  Weight changes:  Denies  Lack of energy:  Sometimes but improving  Anhedonia:  Sometimes but improving  Somatic symptoms:  Yes, as above, when anxious  Libido:  Not discussed  Anxiety/panic:  " "Yes  Guilty/hopeless:  Denies  Self-injurious behavior/risky behavior:  Denies  Any drugs:  Denies  Alcohol:  Occasional wine  Breastfeeding:  Yes    MEDICAL REVIEW OF SYSTEMS:  Complete review of systems performed covering Constitutional, Musculoskeletal, Neurologic.  All systems negative except for that covered in HPI.    PAST PSYCHIATRIC, MEDICAL, AND SOCIAL HISTORY REVIEWED  The patient's past medical, family and social history have been reviewed and updated as appropriate within the electronic medical record - see encounter notes.    MEDICATIONS:    Current Outpatient Medications:     albuterol (PROVENTIL/VENTOLIN HFA) 90 mcg/actuation inhaler, Inhale 1-2 puffs into the lungs every 6 (six) hours as needed for Wheezing. Rescue, Disp: 1 Inhaler, Rfl: 0    cetirizine (ZYRTEC) 10 MG tablet, Take 10 mg by mouth daily as needed., Disp: , Rfl:     ferrous sulfate 324 mg (65 mg iron) TbEC, Take 324 mg by mouth once daily., Disp: , Rfl:     magnesium 30 mg Tab, Take by mouth once., Disp: , Rfl:     omega-3 acid ethyl esters (LOVAZA) 1 gram capsule, Take 2 g by mouth 2 (two) times daily., Disp: , Rfl:     -IRON-FOLATE-DHA ORAL, Take by mouth., Disp: , Rfl:     sertraline (ZOLOFT) 25 MG tablet, Take 1/2 tablet (12.5mg) PO daily x 1 week.  Then increase to 1 tablet (25mg) daily thereafter., Disp: 30 tablet, Rfl: 1    ALLERGIES:  Review of patient's allergies indicates:   Allergen Reactions    Ketorolac Shortness Of Breath       PSYCHIATRIC EXAM:  There were no vitals filed for this visit.  Appearance:  Well groomed, appearing healthy and of stated age  Behavior:  Cooperative, pleasant, no psychomotor agitation or retardation  Speech:  Normal rate, rhythm, prosody, and volume  Mood:  "Better"  Affect:  Congruent, euthymic  Thought Process:  Linear, logical, goal directed  Thought Content:  Negative for suicidal ideation, homicidal ideation, delusions or hallucinations.  Associations:  Intact  Memory:  Grossly " Intact  Level of Consciousness/Orientation:  Grossly intact  Fund of Knowledge:  Good  Attention:  Good  Language:  Fluent, able to name abstract and concrete objects  Insight:  Good  Judgment:  Intact  Psychomotor signs:  No abnormal movements of face  Gait:  Unable to assess via virtual visit      RELEVANT LABS/STUDIES:    Lab Results   Component Value Date    WBC 11.84 01/26/2022    HGB 14.3 01/26/2022    HCT 41.3 01/26/2022    MCV 87 01/26/2022     01/26/2022     BMP  Lab Results   Component Value Date     10/08/2018    K 3.3 (L) 10/08/2018     10/08/2018    CO2 27 10/08/2018    BUN 9 10/08/2018    CREATININE 0.8 10/08/2018    CALCIUM 9.9 10/08/2018    ANIONGAP 10 10/08/2018    ESTGFRAFRICA >60.0 10/08/2018    EGFRNONAA >60.0 10/08/2018     Lab Results   Component Value Date    ALT 13 01/15/2017    AST 20 01/15/2017    ALKPHOS 46 (L) 01/15/2017    BILITOT 0.2 01/15/2017     Lab Results   Component Value Date    TSH 2.574 01/15/2017     No results found for: LABA1C, HGBA1C    IMPRESSION:    Bhargavi Hitchcock is a 26 y.o. female with history of Depression, Anxiety, PTSD, and Mood disorder (R/o borderline personality disorder versus bipolar 2 disorder) who presents for follow up appointment.      Status/Progress:  Based on the examination today, the patient's problem(s) is/are inadequately controlled.  New problems have not been presented today.     Risk Parameters:  Patient reports no suicidal ideation  Patient reports no homicidal ideation  Patient reports no self-injurious behavior  Patient reports no violent behavior    DIAGNOSES:    ICD-10-CM ICD-9-CM   1. JUAN R (generalized anxiety disorder)  F41.1 300.02   2. Moderate episode of recurrent major depressive disorder  F33.1 296.32   3. PTSD (post-traumatic stress disorder)  F43.10 309.81   4. Mood disorder (r/o bipolar 2, r/o borderline personality)  F39 296.90       PLAN:  Will continue Zoloft 12.5mg daily for a few more weeks.  Once tolerating,  will again try to increase to 25mg daily.  Somatic symptoms with last increase seem to be related to anxiety.   Likely diagnosis of borderline personality disorder rather than bipolar disorder, however discussed risk of guerrero when starting an SSRI if she does have bipolar disorder.  She agrees to reach out to me.    Discussed with patient informed consent, risks versus benefits (especially while breastfeeding), alternative treatments, side effect profile and the inherent unpredictability of individual responses to these treatments.  The patient expresses understanding of the above and displays the capacity to agree with this current plan.  Patient missed initial appointment with Dr. Laguerre for therapy.    RETURN TO CLINIC:  Follow up in about 2 weeks (around 10/25/2022).

## 2022-10-16 PROBLEM — F31.81 BIPOLAR 2 DISORDER: Status: RESOLVED | Noted: 2022-09-27 | Resolved: 2022-10-16

## 2022-11-07 ENCOUNTER — OFFICE VISIT (OUTPATIENT)
Dept: OPTOMETRY | Facility: CLINIC | Age: 27
End: 2022-11-07
Payer: COMMERCIAL

## 2022-11-07 DIAGNOSIS — H52.13 MYOPIA WITH ASTIGMATISM, BILATERAL: ICD-10-CM

## 2022-11-07 DIAGNOSIS — G43.109 OCULAR MIGRAINE: Primary | ICD-10-CM

## 2022-11-07 DIAGNOSIS — H52.203 MYOPIA WITH ASTIGMATISM, BILATERAL: ICD-10-CM

## 2022-11-07 PROCEDURE — 92015 DETERMINE REFRACTIVE STATE: CPT | Mod: S$GLB,,, | Performed by: OPTOMETRIST

## 2022-11-07 PROCEDURE — 99999 PR PBB SHADOW E&M-EST. PATIENT-LVL III: ICD-10-PCS | Mod: PBBFAC,,, | Performed by: OPTOMETRIST

## 2022-11-07 PROCEDURE — 92004 COMPRE OPH EXAM NEW PT 1/>: CPT | Mod: S$GLB,,, | Performed by: OPTOMETRIST

## 2022-11-07 PROCEDURE — 1160F PR REVIEW ALL MEDS BY PRESCRIBER/CLIN PHARMACIST DOCUMENTED: ICD-10-PCS | Mod: CPTII,S$GLB,, | Performed by: OPTOMETRIST

## 2022-11-07 PROCEDURE — 1159F MED LIST DOCD IN RCRD: CPT | Mod: CPTII,S$GLB,, | Performed by: OPTOMETRIST

## 2022-11-07 PROCEDURE — 92015 PR REFRACTION: ICD-10-PCS | Mod: S$GLB,,, | Performed by: OPTOMETRIST

## 2022-11-07 PROCEDURE — 99999 PR PBB SHADOW E&M-EST. PATIENT-LVL III: CPT | Mod: PBBFAC,,, | Performed by: OPTOMETRIST

## 2022-11-07 PROCEDURE — 1160F RVW MEDS BY RX/DR IN RCRD: CPT | Mod: CPTII,S$GLB,, | Performed by: OPTOMETRIST

## 2022-11-07 PROCEDURE — 92004 PR EYE EXAM, NEW PATIENT,COMPREHESV: ICD-10-PCS | Mod: S$GLB,,, | Performed by: OPTOMETRIST

## 2022-11-07 PROCEDURE — 1159F PR MEDICATION LIST DOCUMENTED IN MEDICAL RECORD: ICD-10-PCS | Mod: CPTII,S$GLB,, | Performed by: OPTOMETRIST

## 2022-11-07 NOTE — PROGRESS NOTES
[employer]: [employer]  Chief complaint (CC): Patient is here for annual exam.  Patient has   noticed her vision is more blurred distance and intermediate distance for   the past 2-3 weeks.  Patient was playing a video game an realized she   couldn't see the words. Patient had glasses at age 13 but not since.  Glasses? -  Contacts? -  H/o eye surgery, injections or laser: -  H/o eye injury: -  Known eye conditions? -  Family h/o eye conditions? -  Eye gtts? Redness relief prn    (+) Flashes (-)  Floaters (-) Mucous   (-)  Tearing (-) Itching (-) Burning   (-) Headaches (-) Eye Pain/discomfort (-) Irritation   (-)  Redness (-) Double vision (+) Blurry vision    Diabetic? -  A1c? -      Last edited by Aura Ocasio on 11/7/2022  8:13 AM.            Assessment /Plan     For exam results, see Encounter Report.      Ocular migraine  Educated pt. Monitor.     Myopia with astigmatism, bilateral  SRx released to patient. Patient educated on lens options. Normal ocular health. RTC 1 year for routine exam.

## 2022-11-08 ENCOUNTER — OFFICE VISIT (OUTPATIENT)
Dept: PSYCHIATRY | Facility: CLINIC | Age: 27
End: 2022-11-08
Payer: COMMERCIAL

## 2022-11-08 DIAGNOSIS — F43.10 PTSD (POST-TRAUMATIC STRESS DISORDER): ICD-10-CM

## 2022-11-08 DIAGNOSIS — F11.11 HISTORY OF OPIOID ABUSE: ICD-10-CM

## 2022-11-08 DIAGNOSIS — F33.0 MILD EPISODE OF RECURRENT MAJOR DEPRESSIVE DISORDER: ICD-10-CM

## 2022-11-08 DIAGNOSIS — F41.1 GAD (GENERALIZED ANXIETY DISORDER): Primary | ICD-10-CM

## 2022-11-08 DIAGNOSIS — F39 MOOD DISORDER: ICD-10-CM

## 2022-11-08 PROCEDURE — 99214 PR OFFICE/OUTPT VISIT, EST, LEVL IV, 30-39 MIN: ICD-10-PCS | Mod: 95,,, | Performed by: INTERNAL MEDICINE

## 2022-11-08 PROCEDURE — 1159F MED LIST DOCD IN RCRD: CPT | Mod: CPTII,95,, | Performed by: INTERNAL MEDICINE

## 2022-11-08 PROCEDURE — 1160F PR REVIEW ALL MEDS BY PRESCRIBER/CLIN PHARMACIST DOCUMENTED: ICD-10-PCS | Mod: CPTII,95,, | Performed by: INTERNAL MEDICINE

## 2022-11-08 PROCEDURE — 99214 OFFICE O/P EST MOD 30 MIN: CPT | Mod: 95,,, | Performed by: INTERNAL MEDICINE

## 2022-11-08 PROCEDURE — 1160F RVW MEDS BY RX/DR IN RCRD: CPT | Mod: CPTII,95,, | Performed by: INTERNAL MEDICINE

## 2022-11-08 PROCEDURE — 99999 PR PBB SHADOW E&M-EST. PATIENT-LVL II: CPT | Mod: PBBFAC,,, | Performed by: INTERNAL MEDICINE

## 2022-11-08 PROCEDURE — 1159F PR MEDICATION LIST DOCUMENTED IN MEDICAL RECORD: ICD-10-PCS | Mod: CPTII,95,, | Performed by: INTERNAL MEDICINE

## 2022-11-08 PROCEDURE — 99999 PR PBB SHADOW E&M-EST. PATIENT-LVL II: ICD-10-PCS | Mod: PBBFAC,,, | Performed by: INTERNAL MEDICINE

## 2022-11-08 RX ORDER — SERTRALINE HYDROCHLORIDE 50 MG/1
50 TABLET, FILM COATED ORAL DAILY
Qty: 30 TABLET | Refills: 5 | Status: SHIPPED | OUTPATIENT
Start: 2022-11-08 | End: 2023-05-15

## 2022-11-08 NOTE — PROGRESS NOTES
OUTPATIENT PSYCHIATRY RETURN VISIT    ENCOUNTER DATE:  11/8/22   SITE:  [facility], [facility]  LENGTH OF SESSION: 10 minutes    The patient location is:  [place], not in a healthcare facility  Visit type:  audiovisual    Face to Face time with patient:  10 minutes  15 minutes of total time spent on the encounter, which includes face to face time and non-face to face time preparing to see the patient (eg, review of tests), Obtaining and/or reviewing separately obtained history, Documenting clinical information in the electronic or other health record, Independently interpreting results (not separately reported) and communicating results to the patient/family/caregiver, or Care coordination (not separately reported).     Each patient to whom he or she provides medical services by telemedicine is:  (1) informed of the relationship between the physician and patient and the respective role of any other health care provider with respect to management of the patient; and (2) notified that he or she may decline to receive medical services by telemedicine and may withdraw from such care at any time.    CHIEF COMPLAINT:  Mood      HISTORY OF PRESENTING ILLNESS:  Bhargavi Hitchcock is a 27 y.o. female with history of Depression, Anxiety, PTSD, and Mood disorder (R/o borderline personality disorder versus bipolar 2 disorder) who presents for follow up appointment.      Plan at last appointment on 10/11/2022:  Will continue Zoloft 12.5mg daily for a few more weeks.  Once tolerating, will again try to increase to 25mg daily.  Somatic symptoms with last increase seem to be related to anxiety.   Likely diagnosis of borderline personality disorder rather than bipolar disorder, however discussed risk of guerrero when starting an SSRI if she does have bipolar disorder.  She agrees to reach out to me.    Discussed with patient informed consent, risks versus benefits (especially while breastfeeding), alternative treatments,  "side effect profile and the inherent unpredictability of individual responses to these treatments.  The patient expresses understanding of the above and displays the capacity to agree with this current plan.  Patient missed initial appointment with Dr. Laguerre for therapy.    History as told by patient:  Feels like she is doing pretty good.  Noticed she was more in charge of what she had going on but not as much anymore.  Feels Zoloft was helping but not as much anymore.  Hasn't been showering or brushing her teeth as much recently.  Getting angry again.  Sleeping really well.  Better sleep now than ever.  Daughter doing very well.  "Doesn't feel like everything around me is on fire so able to give her more attention."  Denies SI.      Medication side effects:  Denies  Medication compliance:  Yes    PSYCHIATRIC REVIEW OF SYSTEMS:  Trouble with sleep:  Improved  Appetite changes:  Denies  Weight changes:  Denies  Lack of energy:  Sometimes but improving  Anhedonia:  Sometimes but improving  Somatic symptoms:  Denies  Libido:  Not discussed  Anxiety/panic:  Yes  Guilty/hopeless:  Denies  Self-injurious behavior/risky behavior:  Denies  Any drugs:  Denies  Alcohol:  Occasional wine  Breastfeeding:  Yes    MEDICAL REVIEW OF SYSTEMS:  Complete review of systems performed covering Constitutional, Musculoskeletal, Neurologic.  All systems negative except for that covered in HPI.    PAST PSYCHIATRIC, MEDICAL, AND SOCIAL HISTORY REVIEWED  The patient's past medical, family and social history have been reviewed and updated as appropriate within the electronic medical record - see encounter notes.    MEDICATIONS:    Current Outpatient Medications:     albuterol (PROVENTIL/VENTOLIN HFA) 90 mcg/actuation inhaler, Inhale 1-2 puffs into the lungs every 6 (six) hours as needed for Wheezing. Rescue, Disp: 1 Inhaler, Rfl: 0    cetirizine (ZYRTEC) 10 MG tablet, Take 10 mg by mouth daily as needed., Disp: , Rfl:     ferrous sulfate 324 mg " "(65 mg iron) TbEC, Take 324 mg by mouth once daily., Disp: , Rfl:     magnesium 30 mg Tab, Take by mouth once., Disp: , Rfl:     omega-3 acid ethyl esters (LOVAZA) 1 gram capsule, Take 2 g by mouth 2 (two) times daily., Disp: , Rfl:     -IRON-FOLATE-DHA ORAL, Take by mouth., Disp: , Rfl:     sertraline (ZOLOFT) 50 MG tablet, Take 1 tablet (50 mg total) by mouth once daily., Disp: 30 tablet, Rfl: 5    ALLERGIES:  Review of patient's allergies indicates:   Allergen Reactions    Ketorolac Shortness Of Breath       PSYCHIATRIC EXAM:  There were no vitals filed for this visit.  Appearance:  Well groomed, appearing healthy and of stated age  Behavior:  Cooperative, pleasant, no psychomotor agitation or retardation  Speech:  Normal rate, rhythm, prosody, and volume  Mood:  "Pretty good"  Affect:  Congruent, euthymic  Thought Process:  Linear, logical, goal directed  Thought Content:  Negative for suicidal ideation, homicidal ideation, delusions or hallucinations.  Associations:  Intact  Memory:  Grossly Intact  Level of Consciousness/Orientation:  Grossly intact  Fund of Knowledge:  Good  Attention:  Good  Language:  Fluent, able to name abstract and concrete objects  Insight:  Good  Judgment:  Intact  Psychomotor signs:  No abnormal movements of face  Gait:  Unable to assess via virtual visit      RELEVANT LABS/STUDIES:    Lab Results   Component Value Date    WBC 11.84 01/26/2022    HGB 14.3 01/26/2022    HCT 41.3 01/26/2022    MCV 87 01/26/2022     01/26/2022     BMP  Lab Results   Component Value Date     10/08/2018    K 3.3 (L) 10/08/2018     10/08/2018    CO2 27 10/08/2018    BUN 9 10/08/2018    CREATININE 0.8 10/08/2018    CALCIUM 9.9 10/08/2018    ANIONGAP 10 10/08/2018    ESTGFRAFRICA >60.0 10/08/2018    EGFRNONAA >60.0 10/08/2018     Lab Results   Component Value Date    ALT 13 01/15/2017    AST 20 01/15/2017    ALKPHOS 46 (L) 01/15/2017    BILITOT 0.2 01/15/2017     Lab Results "   Component Value Date    TSH 2.574 01/15/2017     No results found for: LABA1C, HGBA1C    IMPRESSION:    Bhargavi Hitchcock is a 27 y.o. female with history of Depression, Anxiety, PTSD, and Mood disorder (R/o borderline personality disorder versus bipolar 2 disorder) who presents for follow up appointment.      Status/Progress:  Based on the examination today, the patient's problem(s) is/are inadequately controlled.  New problems have not been presented today.     Risk Parameters:  Patient reports no suicidal ideation  Patient reports no homicidal ideation  Patient reports no self-injurious behavior  Patient reports no violent behavior    DIAGNOSES:    ICD-10-CM ICD-9-CM   1. JUAN R (generalized anxiety disorder)  F41.1 300.02   2. Mild episode of recurrent major depressive disorder  F33.0 296.31   3. Mood disorder (r/o bipolar 2, r/o borderline personality)  F39 296.90   4. PTSD (post-traumatic stress disorder)  F43.10 309.81   5. History of opioid abuse  F11.11 305.53         PLAN:  Increase Zoloft to 50mg daily.    Likely diagnosis of borderline personality disorder rather than bipolar disorder, however discussed risk of guerrero when increasing an SSRI if she does have bipolar disorder.  She agrees to reach out to me.    Discussed with patient informed consent, risks versus benefits (especially while breastfeeding), alternative treatments, side effect profile and the inherent unpredictability of individual responses to these treatments.  The patient expresses understanding of the above and displays the capacity to agree with this current plan.  Patient missed initial appointment with Dr. Laguerre for therapy.  She states she is too busy at this time.    RETURN TO CLINIC:  Follow up in about 4 weeks (around 12/6/2022).

## 2022-11-13 PROBLEM — F32.A DEPRESSION: Status: RESOLVED | Noted: 2022-09-27 | Resolved: 2022-11-13

## 2022-11-13 PROBLEM — F41.9 ANXIETY DISORDER: Status: RESOLVED | Noted: 2022-09-27 | Resolved: 2022-11-13

## 2022-11-13 PROBLEM — F33.0 MILD EPISODE OF RECURRENT MAJOR DEPRESSIVE DISORDER: Status: ACTIVE | Noted: 2022-09-24

## 2023-04-14 ENCOUNTER — PATIENT MESSAGE (OUTPATIENT)
Dept: OBSTETRICS AND GYNECOLOGY | Facility: CLINIC | Age: 28
End: 2023-04-14
Payer: COMMERCIAL

## 2023-05-13 DIAGNOSIS — F43.10 PTSD (POST-TRAUMATIC STRESS DISORDER): ICD-10-CM

## 2023-05-13 DIAGNOSIS — F41.1 GAD (GENERALIZED ANXIETY DISORDER): ICD-10-CM

## 2023-05-13 DIAGNOSIS — F33.0 MILD EPISODE OF RECURRENT MAJOR DEPRESSIVE DISORDER: ICD-10-CM

## 2023-05-15 RX ORDER — SERTRALINE HYDROCHLORIDE 50 MG/1
50 TABLET, FILM COATED ORAL DAILY
Qty: 30 TABLET | Refills: 1 | Status: SHIPPED | OUTPATIENT
Start: 2023-05-15 | End: 2023-07-12

## 2023-06-24 ENCOUNTER — HOSPITAL ENCOUNTER (EMERGENCY)
Facility: OTHER | Age: 28
Discharge: HOME | End: 2023-06-24
Attending: EMERGENCY MEDICINE
Payer: COMMERCIAL

## 2023-06-24 VITALS
OXYGEN SATURATION: 98 % | HEIGHT: 61 IN | DIASTOLIC BLOOD PRESSURE: 67 MMHG | HEART RATE: 61 BPM | TEMPERATURE: 99 F | BODY MASS INDEX: 27.38 KG/M2 | WEIGHT: 145 LBS | RESPIRATION RATE: 16 BRPM | SYSTOLIC BLOOD PRESSURE: 97 MMHG
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
## 2023-06-24 DIAGNOSIS — S70.02XA CONTUSION OF LEFT HIP, INITIAL ENCOUNTER: ICD-10-CM

## 2023-06-24 DIAGNOSIS — S39.012A STRAIN OF LUMBAR REGION, INITIAL ENCOUNTER: ICD-10-CM

## 2023-06-24 LAB
B-HCG UR QL: NEGATIVE
CTP QC/QA: YES

## 2023-06-24 PROCEDURE — 25000003 PHARM REV CODE 250: Performed by: NURSE PRACTITIONER

## 2023-06-24 PROCEDURE — 25000003 PHARM REV CODE 250: Performed by: EMERGENCY MEDICINE

## 2023-06-24 PROCEDURE — 99284 EMERGENCY DEPT VISIT MOD MDM: CPT

## 2023-06-24 PROCEDURE — 63600175 PHARM REV CODE 636 W HCPCS: Performed by: EMERGENCY MEDICINE

## 2023-06-24 PROCEDURE — 96372 THER/PROPH/DIAG INJ SC/IM: CPT | Performed by: EMERGENCY MEDICINE

## 2023-06-24 PROCEDURE — 81025 URINE PREGNANCY TEST: CPT | Performed by: EMERGENCY MEDICINE

## 2023-06-24 RX ORDER — CYCLOBENZAPRINE HCL 5 MG
5 TABLET ORAL 3 TIMES DAILY PRN
Qty: 20 TABLET | Refills: 0 | Status: SHIPPED | OUTPATIENT
Start: 2023-06-24 | End: 2023-06-29

## 2023-06-24 RX ORDER — IBUPROFEN 800 MG/1
800 TABLET ORAL EVERY 6 HOURS PRN
Qty: 30 TABLET | Refills: 0 | Status: SHIPPED | OUTPATIENT
Start: 2023-06-24 | End: 2023-10-19

## 2023-06-24 RX ORDER — ORPHENADRINE CITRATE 30 MG/ML
60 INJECTION INTRAMUSCULAR; INTRAVENOUS
Status: COMPLETED | OUTPATIENT
Start: 2023-06-24 | End: 2023-06-24

## 2023-06-24 RX ORDER — ACETAMINOPHEN 325 MG/1
650 TABLET ORAL
Status: COMPLETED | OUTPATIENT
Start: 2023-06-24 | End: 2023-06-24

## 2023-06-24 RX ORDER — IBUPROFEN 400 MG/1
800 TABLET ORAL
Status: COMPLETED | OUTPATIENT
Start: 2023-06-24 | End: 2023-06-24

## 2023-06-24 RX ADMIN — IBUPROFEN 800 MG: 400 TABLET ORAL at 06:06

## 2023-06-24 RX ADMIN — ACETAMINOPHEN 650 MG: 325 TABLET, FILM COATED ORAL at 05:06

## 2023-06-24 RX ADMIN — ORPHENADRINE CITRATE 60 MG: 60 INJECTION INTRAMUSCULAR; INTRAVENOUS at 06:06

## 2023-06-24 NOTE — ED NOTES
LOC: The patient is awake, alert and aware of environment with an appropriate affect, the patient is oriented x 3 and speaking appropriately.  APPEARANCE: Patient resting comfortably and in no acute distress, patient is clean and well groomed, patient's clothing is properly fastened.  SKIN: The skin is warm and dry, patient has normal skin turgor and moist mucus membranes, skin intact, no breakdown or brusing noted.  MUSKULOSKELETAL: Patient w/ + pain to left wrist, left hip and headache  RESPIRATORY: Airway is open and patent, respirations are spontaneous, patient has a normal effort and rate. Breath sounds are clear and equal bilaterally.  CARDIAC: Normal heart sounds. No peripheral edema.  ABDOMEN: Soft and non tender to palpation, no distention noted. Bowel sounds present.
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
## 2023-07-12 DIAGNOSIS — F43.10 PTSD (POST-TRAUMATIC STRESS DISORDER): ICD-10-CM

## 2023-07-12 DIAGNOSIS — F41.1 GAD (GENERALIZED ANXIETY DISORDER): ICD-10-CM

## 2023-07-12 DIAGNOSIS — F33.0 MILD EPISODE OF RECURRENT MAJOR DEPRESSIVE DISORDER: ICD-10-CM

## 2023-07-12 RX ORDER — SERTRALINE HYDROCHLORIDE 50 MG/1
TABLET, FILM COATED ORAL
Qty: 30 TABLET | Refills: 0 | Status: SHIPPED | OUTPATIENT
Start: 2023-07-12 | End: 2023-08-05

## 2023-08-05 DIAGNOSIS — F33.0 MILD EPISODE OF RECURRENT MAJOR DEPRESSIVE DISORDER: ICD-10-CM

## 2023-08-05 DIAGNOSIS — F41.1 GAD (GENERALIZED ANXIETY DISORDER): ICD-10-CM

## 2023-08-05 DIAGNOSIS — F43.10 PTSD (POST-TRAUMATIC STRESS DISORDER): ICD-10-CM

## 2023-08-05 RX ORDER — SERTRALINE HYDROCHLORIDE 50 MG/1
TABLET, FILM COATED ORAL
Qty: 30 TABLET | Refills: 0 | Status: SHIPPED | OUTPATIENT
Start: 2023-08-05 | End: 2023-08-09 | Stop reason: SDUPTHER

## 2023-08-09 ENCOUNTER — OFFICE VISIT (OUTPATIENT)
Dept: PSYCHIATRY | Facility: CLINIC | Age: 28
End: 2023-08-09
Payer: COMMERCIAL

## 2023-08-09 DIAGNOSIS — F41.1 GAD (GENERALIZED ANXIETY DISORDER): Primary | ICD-10-CM

## 2023-08-09 DIAGNOSIS — F60.3 BORDERLINE PERSONALITY DISORDER: ICD-10-CM

## 2023-08-09 DIAGNOSIS — F43.10 PTSD (POST-TRAUMATIC STRESS DISORDER): ICD-10-CM

## 2023-08-09 DIAGNOSIS — F33.41 RECURRENT MAJOR DEPRESSIVE DISORDER, IN PARTIAL REMISSION: ICD-10-CM

## 2023-08-09 PROCEDURE — 1159F MED LIST DOCD IN RCRD: CPT | Mod: CPTII,95,, | Performed by: INTERNAL MEDICINE

## 2023-08-09 PROCEDURE — 1160F PR REVIEW ALL MEDS BY PRESCRIBER/CLIN PHARMACIST DOCUMENTED: ICD-10-PCS | Mod: CPTII,95,, | Performed by: INTERNAL MEDICINE

## 2023-08-09 PROCEDURE — 1160F RVW MEDS BY RX/DR IN RCRD: CPT | Mod: CPTII,95,, | Performed by: INTERNAL MEDICINE

## 2023-08-09 PROCEDURE — 99214 OFFICE O/P EST MOD 30 MIN: CPT | Mod: 95,,, | Performed by: INTERNAL MEDICINE

## 2023-08-09 PROCEDURE — 99214 PR OFFICE/OUTPT VISIT, EST, LEVL IV, 30-39 MIN: ICD-10-PCS | Mod: 95,,, | Performed by: INTERNAL MEDICINE

## 2023-08-09 PROCEDURE — 1159F PR MEDICATION LIST DOCUMENTED IN MEDICAL RECORD: ICD-10-PCS | Mod: CPTII,95,, | Performed by: INTERNAL MEDICINE

## 2023-08-09 RX ORDER — SERTRALINE HYDROCHLORIDE 50 MG/1
75 TABLET, FILM COATED ORAL DAILY
Qty: 45 TABLET | Refills: 11 | Status: SHIPPED | OUTPATIENT
Start: 2023-08-09
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
## 2023-08-27 ENCOUNTER — PATIENT MESSAGE (OUTPATIENT)
Dept: PSYCHIATRY | Facility: CLINIC | Age: 28
End: 2023-08-27
Payer: COMMERCIAL

## 2023-09-07 ENCOUNTER — OFFICE VISIT (OUTPATIENT)
Dept: OPTOMETRY | Facility: CLINIC | Age: 28
End: 2023-09-07
Payer: COMMERCIAL

## 2023-09-07 DIAGNOSIS — H52.12 MYOPIA WITH ASTIGMATISM, LEFT: ICD-10-CM

## 2023-09-07 DIAGNOSIS — H52.202 MYOPIA WITH ASTIGMATISM, LEFT: ICD-10-CM

## 2023-09-07 DIAGNOSIS — G43.109 OCULAR MIGRAINE: Primary | ICD-10-CM

## 2023-09-07 DIAGNOSIS — H52.221 REGULAR ASTIGMATISM OF RIGHT EYE: ICD-10-CM

## 2023-09-07 PROCEDURE — 1159F MED LIST DOCD IN RCRD: CPT | Mod: CPTII,S$GLB,, | Performed by: OPTOMETRIST

## 2023-09-07 PROCEDURE — 92015 PR REFRACTION: ICD-10-PCS | Mod: S$GLB,,, | Performed by: OPTOMETRIST

## 2023-09-07 PROCEDURE — 99999 PR PBB SHADOW E&M-EST. PATIENT-LVL III: CPT | Mod: PBBFAC,,, | Performed by: OPTOMETRIST

## 2023-09-07 PROCEDURE — 1160F RVW MEDS BY RX/DR IN RCRD: CPT | Mod: CPTII,S$GLB,, | Performed by: OPTOMETRIST

## 2023-09-07 PROCEDURE — 1159F PR MEDICATION LIST DOCUMENTED IN MEDICAL RECORD: ICD-10-PCS | Mod: CPTII,S$GLB,, | Performed by: OPTOMETRIST

## 2023-09-07 PROCEDURE — 1160F PR REVIEW ALL MEDS BY PRESCRIBER/CLIN PHARMACIST DOCUMENTED: ICD-10-PCS | Mod: CPTII,S$GLB,, | Performed by: OPTOMETRIST

## 2023-09-07 PROCEDURE — 92014 PR EYE EXAM, EST PATIENT,COMPREHESV: ICD-10-PCS | Mod: S$GLB,,, | Performed by: OPTOMETRIST

## 2023-09-07 PROCEDURE — 99999 PR PBB SHADOW E&M-EST. PATIENT-LVL III: ICD-10-PCS | Mod: PBBFAC,,, | Performed by: OPTOMETRIST

## 2023-09-07 PROCEDURE — 92014 COMPRE OPH EXAM EST PT 1/>: CPT | Mod: S$GLB,,, | Performed by: OPTOMETRIST

## 2023-09-07 PROCEDURE — 92015 DETERMINE REFRACTIVE STATE: CPT | Mod: S$GLB,,, | Performed by: OPTOMETRIST

## 2023-09-07 RX ORDER — CONDOMS, FEMALE
EACH MISCELLANEOUS
COMMUNITY
Start: 2023-08-15 | End: 2023-09-25

## 2023-09-07 RX ORDER — CYCLOBENZAPRINE HCL 10 MG
10 TABLET ORAL EVERY 8 HOURS PRN
COMMUNITY
Start: 2023-08-28

## 2023-09-07 RX ORDER — CLONAZEPAM 0.25 MG/1
TABLET, ORALLY DISINTEGRATING ORAL
COMMUNITY
Start: 2023-06-18

## 2023-09-07 NOTE — PROGRESS NOTES
HPI     Blurred Vision     Additional comments: Blurry va OU           Comments    BRADLEY: 11/7/2022  Chief complaint (CC):Pt states her va has become more blurry, for the   distance and near, over the past month. She broke her glasses about 1   month ago.  Glasses? +  Contacts? -  H/o eye surgery, injections or laser: -  H/o eye injury: -  Known eye conditions? -  Family h/o eye conditions? -  Eye gtts? -    (+) Flashes (-)  Floaters (-) Mucous   (-)  Tearing (-) Itching (-) Burning   (-) Headaches (-) Eye Pain/discomfort (-) Irritation   (-)  Redness (-) Double vision (+) Blurry vision    Diabetic? -  A1c? -              Last edited by Raymon Alexander on 9/7/2023  3:40 PM.            Assessment /Plan     For exam results, see Encounter Report.      Ocular migraine  Stable. Monitor.     Regular astigmatism of right eye  Myopia with astigmatism, left  SRx released to patient. Patient educated on lens options. Normal ocular health. RTC 1 year for routine exam.

## 2023-09-12 ENCOUNTER — OFFICE VISIT (OUTPATIENT)
Dept: PRIMARY CARE CLINIC | Facility: CLINIC | Age: 28
End: 2023-09-12
Payer: COMMERCIAL

## 2023-09-12 VITALS
OXYGEN SATURATION: 99 % | HEIGHT: 61 IN | DIASTOLIC BLOOD PRESSURE: 64 MMHG | BODY MASS INDEX: 30.26 KG/M2 | SYSTOLIC BLOOD PRESSURE: 116 MMHG | HEART RATE: 77 BPM | WEIGHT: 160.25 LBS

## 2023-09-12 DIAGNOSIS — Z00.00 ANNUAL PHYSICAL EXAM: Primary | ICD-10-CM

## 2023-09-12 DIAGNOSIS — K59.09 CHRONIC CONSTIPATION: ICD-10-CM

## 2023-09-12 DIAGNOSIS — F11.11 HISTORY OF OPIOID ABUSE: ICD-10-CM

## 2023-09-12 PROCEDURE — 3044F PR MOST RECENT HEMOGLOBIN A1C LEVEL <7.0%: ICD-10-PCS | Mod: CPTII,S$GLB,, | Performed by: STUDENT IN AN ORGANIZED HEALTH CARE EDUCATION/TRAINING PROGRAM

## 2023-09-12 PROCEDURE — 3008F PR BODY MASS INDEX (BMI) DOCUMENTED: ICD-10-PCS | Mod: CPTII,S$GLB,, | Performed by: STUDENT IN AN ORGANIZED HEALTH CARE EDUCATION/TRAINING PROGRAM

## 2023-09-12 PROCEDURE — 3078F DIAST BP <80 MM HG: CPT | Mod: CPTII,S$GLB,, | Performed by: STUDENT IN AN ORGANIZED HEALTH CARE EDUCATION/TRAINING PROGRAM

## 2023-09-12 PROCEDURE — 3078F PR MOST RECENT DIASTOLIC BLOOD PRESSURE < 80 MM HG: ICD-10-PCS | Mod: CPTII,S$GLB,, | Performed by: STUDENT IN AN ORGANIZED HEALTH CARE EDUCATION/TRAINING PROGRAM

## 2023-09-12 PROCEDURE — 99395 PREV VISIT EST AGE 18-39: CPT | Mod: S$GLB,,, | Performed by: STUDENT IN AN ORGANIZED HEALTH CARE EDUCATION/TRAINING PROGRAM

## 2023-09-12 PROCEDURE — 1159F MED LIST DOCD IN RCRD: CPT | Mod: CPTII,S$GLB,, | Performed by: STUDENT IN AN ORGANIZED HEALTH CARE EDUCATION/TRAINING PROGRAM

## 2023-09-12 PROCEDURE — 99999 PR PBB SHADOW E&M-EST. PATIENT-LVL V: CPT | Mod: PBBFAC,,, | Performed by: STUDENT IN AN ORGANIZED HEALTH CARE EDUCATION/TRAINING PROGRAM

## 2023-09-12 PROCEDURE — 3074F PR MOST RECENT SYSTOLIC BLOOD PRESSURE < 130 MM HG: ICD-10-PCS | Mod: CPTII,S$GLB,, | Performed by: STUDENT IN AN ORGANIZED HEALTH CARE EDUCATION/TRAINING PROGRAM

## 2023-09-12 PROCEDURE — 99999 PR PBB SHADOW E&M-EST. PATIENT-LVL V: ICD-10-PCS | Mod: PBBFAC,,, | Performed by: STUDENT IN AN ORGANIZED HEALTH CARE EDUCATION/TRAINING PROGRAM

## 2023-09-12 PROCEDURE — 1160F RVW MEDS BY RX/DR IN RCRD: CPT | Mod: CPTII,S$GLB,, | Performed by: STUDENT IN AN ORGANIZED HEALTH CARE EDUCATION/TRAINING PROGRAM

## 2023-09-12 PROCEDURE — 3074F SYST BP LT 130 MM HG: CPT | Mod: CPTII,S$GLB,, | Performed by: STUDENT IN AN ORGANIZED HEALTH CARE EDUCATION/TRAINING PROGRAM

## 2023-09-12 PROCEDURE — 3044F HG A1C LEVEL LT 7.0%: CPT | Mod: CPTII,S$GLB,, | Performed by: STUDENT IN AN ORGANIZED HEALTH CARE EDUCATION/TRAINING PROGRAM

## 2023-09-12 PROCEDURE — 3008F BODY MASS INDEX DOCD: CPT | Mod: CPTII,S$GLB,, | Performed by: STUDENT IN AN ORGANIZED HEALTH CARE EDUCATION/TRAINING PROGRAM

## 2023-09-12 PROCEDURE — 99395 PR PREVENTIVE VISIT,EST,18-39: ICD-10-PCS | Mod: S$GLB,,, | Performed by: STUDENT IN AN ORGANIZED HEALTH CARE EDUCATION/TRAINING PROGRAM

## 2023-09-12 PROCEDURE — 1160F PR REVIEW ALL MEDS BY PRESCRIBER/CLIN PHARMACIST DOCUMENTED: ICD-10-PCS | Mod: CPTII,S$GLB,, | Performed by: STUDENT IN AN ORGANIZED HEALTH CARE EDUCATION/TRAINING PROGRAM

## 2023-09-12 PROCEDURE — 1159F PR MEDICATION LIST DOCUMENTED IN MEDICAL RECORD: ICD-10-PCS | Mod: CPTII,S$GLB,, | Performed by: STUDENT IN AN ORGANIZED HEALTH CARE EDUCATION/TRAINING PROGRAM
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
## 2023-09-13 ENCOUNTER — LAB VISIT (OUTPATIENT)
Dept: LAB | Facility: HOSPITAL | Age: 28
End: 2023-09-13
Attending: STUDENT IN AN ORGANIZED HEALTH CARE EDUCATION/TRAINING PROGRAM
Payer: COMMERCIAL

## 2023-09-13 DIAGNOSIS — Z00.00 ANNUAL PHYSICAL EXAM: ICD-10-CM

## 2023-09-13 DIAGNOSIS — K59.09 CHRONIC CONSTIPATION: ICD-10-CM

## 2023-09-13 LAB
ALBUMIN SERPL BCP-MCNC: 4 G/DL (ref 3.5–5.2)
ALP SERPL-CCNC: 38 U/L (ref 55–135)
ALT SERPL W/O P-5'-P-CCNC: 14 U/L (ref 10–44)
ANION GAP SERPL CALC-SCNC: 11 MMOL/L (ref 8–16)
AST SERPL-CCNC: 20 U/L (ref 10–40)
BASOPHILS # BLD AUTO: 0.11 K/UL (ref 0–0.2)
BASOPHILS NFR BLD: 1.5 % (ref 0–1.9)
BILIRUB SERPL-MCNC: 0.4 MG/DL (ref 0.1–1)
BUN SERPL-MCNC: 9 MG/DL (ref 6–20)
CALCIUM SERPL-MCNC: 9.1 MG/DL (ref 8.7–10.5)
CHLORIDE SERPL-SCNC: 105 MMOL/L (ref 95–110)
CHOLEST SERPL-MCNC: 175 MG/DL (ref 120–199)
CHOLEST/HDLC SERPL: 3 {RATIO} (ref 2–5)
CO2 SERPL-SCNC: 21 MMOL/L (ref 23–29)
CREAT SERPL-MCNC: 1 MG/DL (ref 0.5–1.4)
DIFFERENTIAL METHOD: NORMAL
EOSINOPHIL # BLD AUTO: 0.4 K/UL (ref 0–0.5)
EOSINOPHIL NFR BLD: 6.1 % (ref 0–8)
ERYTHROCYTE [DISTWIDTH] IN BLOOD BY AUTOMATED COUNT: 12.5 % (ref 11.5–14.5)
EST. GFR  (NO RACE VARIABLE): >60 ML/MIN/1.73 M^2
ESTIMATED AVG GLUCOSE: 97 MG/DL (ref 68–131)
GLUCOSE SERPL-MCNC: 79 MG/DL (ref 70–110)
HBA1C MFR BLD: 5 % (ref 4–5.6)
HCT VFR BLD AUTO: 38.1 % (ref 37–48.5)
HDLC SERPL-MCNC: 58 MG/DL (ref 40–75)
HDLC SERPL: 33.1 % (ref 20–50)
HGB BLD-MCNC: 12.7 G/DL (ref 12–16)
IMM GRANULOCYTES # BLD AUTO: 0 K/UL (ref 0–0.04)
IMM GRANULOCYTES NFR BLD AUTO: 0 % (ref 0–0.5)
LDLC SERPL CALC-MCNC: 105.4 MG/DL (ref 63–159)
LYMPHOCYTES # BLD AUTO: 3.4 K/UL (ref 1–4.8)
LYMPHOCYTES NFR BLD: 47.1 % (ref 18–48)
MCH RBC QN AUTO: 29.3 PG (ref 27–31)
MCHC RBC AUTO-ENTMCNC: 33.3 G/DL (ref 32–36)
MCV RBC AUTO: 88 FL (ref 82–98)
MONOCYTES # BLD AUTO: 0.5 K/UL (ref 0.3–1)
MONOCYTES NFR BLD: 6.4 % (ref 4–15)
NEUTROPHILS # BLD AUTO: 2.8 K/UL (ref 1.8–7.7)
NEUTROPHILS NFR BLD: 38.9 % (ref 38–73)
NONHDLC SERPL-MCNC: 117 MG/DL
NRBC BLD-RTO: 0 /100 WBC
PLATELET # BLD AUTO: 270 K/UL (ref 150–450)
PMV BLD AUTO: 10.6 FL (ref 9.2–12.9)
POTASSIUM SERPL-SCNC: 3.8 MMOL/L (ref 3.5–5.1)
PROT SERPL-MCNC: 7.1 G/DL (ref 6–8.4)
RBC # BLD AUTO: 4.33 M/UL (ref 4–5.4)
SODIUM SERPL-SCNC: 137 MMOL/L (ref 136–145)
TRIGL SERPL-MCNC: 58 MG/DL (ref 30–150)
TSH SERPL DL<=0.005 MIU/L-ACNC: 2.14 UIU/ML (ref 0.4–4)
WBC # BLD AUTO: 7.2 K/UL (ref 3.9–12.7)

## 2023-09-13 PROCEDURE — 85025 COMPLETE CBC W/AUTO DIFF WBC: CPT | Performed by: STUDENT IN AN ORGANIZED HEALTH CARE EDUCATION/TRAINING PROGRAM

## 2023-09-13 PROCEDURE — 83036 HEMOGLOBIN GLYCOSYLATED A1C: CPT | Performed by: STUDENT IN AN ORGANIZED HEALTH CARE EDUCATION/TRAINING PROGRAM

## 2023-09-13 PROCEDURE — 84443 ASSAY THYROID STIM HORMONE: CPT | Performed by: STUDENT IN AN ORGANIZED HEALTH CARE EDUCATION/TRAINING PROGRAM

## 2023-09-13 PROCEDURE — 80053 COMPREHEN METABOLIC PANEL: CPT | Performed by: STUDENT IN AN ORGANIZED HEALTH CARE EDUCATION/TRAINING PROGRAM

## 2023-09-13 PROCEDURE — 80061 LIPID PANEL: CPT | Performed by: STUDENT IN AN ORGANIZED HEALTH CARE EDUCATION/TRAINING PROGRAM

## 2023-09-13 PROCEDURE — 36415 COLL VENOUS BLD VENIPUNCTURE: CPT | Performed by: STUDENT IN AN ORGANIZED HEALTH CARE EDUCATION/TRAINING PROGRAM

## 2023-09-14 ENCOUNTER — PATIENT MESSAGE (OUTPATIENT)
Dept: PRIMARY CARE CLINIC | Facility: CLINIC | Age: 28
End: 2023-09-14
Payer: COMMERCIAL

## 2023-09-15 NOTE — TELEPHONE ENCOUNTER
"Both of those lab values are normal even though it appears "yellow" on the labs.    She would need to come back to clinic to talk about those things.    I advise if she feels short of breath, needs to be evaluated in ER/urgent care.  "

## 2023-09-25 ENCOUNTER — PATIENT MESSAGE (OUTPATIENT)
Dept: GASTROENTEROLOGY | Facility: CLINIC | Age: 28
End: 2023-09-25

## 2023-09-25 ENCOUNTER — HOSPITAL ENCOUNTER (OUTPATIENT)
Dept: RADIOLOGY | Facility: HOSPITAL | Age: 28
Discharge: HOME | End: 2023-09-25
Payer: COMMERCIAL

## 2023-09-25 ENCOUNTER — OFFICE VISIT (OUTPATIENT)
Dept: GASTROENTEROLOGY | Facility: CLINIC | Age: 28
End: 2023-09-25
Payer: COMMERCIAL

## 2023-09-25 ENCOUNTER — PATIENT MESSAGE (OUTPATIENT)
Dept: PRIMARY CARE CLINIC | Facility: CLINIC | Age: 28
End: 2023-09-25
Payer: COMMERCIAL

## 2023-09-25 VITALS — BODY MASS INDEX: 31.12 KG/M2 | WEIGHT: 164.69 LBS

## 2023-09-25 DIAGNOSIS — R14.0 ABDOMINAL BLOATING: ICD-10-CM

## 2023-09-25 DIAGNOSIS — K59.04 CHRONIC IDIOPATHIC CONSTIPATION: Primary | ICD-10-CM

## 2023-09-25 DIAGNOSIS — K59.04 CHRONIC IDIOPATHIC CONSTIPATION: ICD-10-CM

## 2023-09-25 PROCEDURE — 1159F PR MEDICATION LIST DOCUMENTED IN MEDICAL RECORD: ICD-10-PCS | Mod: CPTII,S$GLB,,

## 2023-09-25 PROCEDURE — 3044F PR MOST RECENT HEMOGLOBIN A1C LEVEL <7.0%: ICD-10-PCS | Mod: CPTII,S$GLB,,

## 2023-09-25 PROCEDURE — 74018 RADEX ABDOMEN 1 VIEW: CPT | Mod: TC,FY

## 2023-09-25 PROCEDURE — 99999 PR PBB SHADOW E&M-EST. PATIENT-LVL IV: ICD-10-PCS | Mod: PBBFAC,,,

## 2023-09-25 PROCEDURE — 3008F BODY MASS INDEX DOCD: CPT | Mod: CPTII,S$GLB,,

## 2023-09-25 PROCEDURE — 3044F HG A1C LEVEL LT 7.0%: CPT | Mod: CPTII,S$GLB,,

## 2023-09-25 PROCEDURE — 74018 RADEX ABDOMEN 1 VIEW: CPT | Mod: 26,,, | Performed by: RADIOLOGY

## 2023-09-25 PROCEDURE — 74018 XR ABDOMEN AP 1 VIEW: ICD-10-PCS | Mod: 26,,, | Performed by: RADIOLOGY

## 2023-09-25 PROCEDURE — 1159F MED LIST DOCD IN RCRD: CPT | Mod: CPTII,S$GLB,,

## 2023-09-25 PROCEDURE — 3008F PR BODY MASS INDEX (BMI) DOCUMENTED: ICD-10-PCS | Mod: CPTII,S$GLB,,

## 2023-09-25 PROCEDURE — 99204 PR OFFICE/OUTPT VISIT, NEW, LEVL IV, 45-59 MIN: ICD-10-PCS | Mod: S$GLB,,,

## 2023-09-25 PROCEDURE — 99999 PR PBB SHADOW E&M-EST. PATIENT-LVL IV: CPT | Mod: PBBFAC,,,

## 2023-09-25 PROCEDURE — 99204 OFFICE O/P NEW MOD 45 MIN: CPT | Mod: S$GLB,,,

## 2023-09-25 RX ORDER — ALBUTEROL SULFATE 90 UG/1
1-2 AEROSOL, METERED RESPIRATORY (INHALATION) EVERY 6 HOURS PRN
Qty: 18 G | Refills: 1 | OUTPATIENT
Start: 2023-09-25 | End: 2023-09-26 | Stop reason: SDUPTHER
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
## 2023-09-25 NOTE — PATIENT INSTRUCTIONS
Clean Out Instructions;    Purchase a 4.1 oz bottle of Miralax (Generic ok).        Purchase a 64 oz bottle of Gatorade.        Mix them both together and drink it.    2.  If you still have not had a Bowel Movement then,       Purchase 1 bottle of Milk of Magnesia and drink.(12oz bottle, any flavor, Generic ok)      3.  You can take Linzess Daily.       Or take a Stool Softner AND 1/2 capful of Miralax every OTHER day.

## 2023-09-26 RX ORDER — ALBUTEROL SULFATE 90 UG/1
1-2 AEROSOL, METERED RESPIRATORY (INHALATION) EVERY 6 HOURS PRN
Qty: 18 G | Refills: 1 | OUTPATIENT
Start: 2023-09-26 | End: 2023-09-29 | Stop reason: SDUPTHER

## 2023-10-01 RX ORDER — ALBUTEROL SULFATE 90 UG/1
1-2 AEROSOL, METERED RESPIRATORY (INHALATION) EVERY 6 HOURS PRN
Qty: 18 G | Refills: 1 | OUTPATIENT
Start: 2023-10-01 | End: 2023-10-04 | Stop reason: SDUPTHER

## 2023-10-04 RX ORDER — ALBUTEROL SULFATE 90 UG/1
1-2 AEROSOL, METERED RESPIRATORY (INHALATION) EVERY 6 HOURS PRN
Qty: 18 G | Refills: 1 | Status: SHIPPED | OUTPATIENT
Start: 2023-10-04 | End: 2023-10-19 | Stop reason: SDUPTHER

## 2023-10-04 NOTE — TELEPHONE ENCOUNTER
----- Message from Matilde Whittaker sent at 10/4/2023 10:56 AM CDT -----  Contact: [phone]  Patient called, stated that she hasnt been able to get the rx  albuterol (PROVENTIL/VENTOLIN HFA) 90 mcg/actuation inhaler. Pharmacy keep saying that rx has not been sent. Patient requested rx to be sent to pharmacy [id number] - [place], [place] - [address] AT [address]. Please contact patient once has been done.  Please  call and advise. Thank you

## 2023-10-19 ENCOUNTER — HOSPITAL ENCOUNTER (OUTPATIENT)
Dept: RADIOLOGY | Facility: HOSPITAL | Age: 28
Discharge: HOME | End: 2023-10-19
Attending: STUDENT IN AN ORGANIZED HEALTH CARE EDUCATION/TRAINING PROGRAM
Payer: COMMERCIAL

## 2023-10-19 ENCOUNTER — OFFICE VISIT (OUTPATIENT)
Dept: PRIMARY CARE CLINIC | Facility: CLINIC | Age: 28
End: 2023-10-19
Payer: COMMERCIAL

## 2023-10-19 VITALS
BODY MASS INDEX: 31.84 KG/M2 | OXYGEN SATURATION: 98 % | SYSTOLIC BLOOD PRESSURE: 114 MMHG | WEIGHT: 168.63 LBS | DIASTOLIC BLOOD PRESSURE: 68 MMHG | HEART RATE: 86 BPM | HEIGHT: 61 IN

## 2023-10-19 DIAGNOSIS — R06.2 WHEEZING: Primary | ICD-10-CM

## 2023-10-19 DIAGNOSIS — R06.2 WHEEZING: ICD-10-CM

## 2023-10-19 PROCEDURE — 1160F RVW MEDS BY RX/DR IN RCRD: CPT | Mod: CPTII,S$GLB,, | Performed by: STUDENT IN AN ORGANIZED HEALTH CARE EDUCATION/TRAINING PROGRAM

## 2023-10-19 PROCEDURE — 3074F SYST BP LT 130 MM HG: CPT | Mod: CPTII,S$GLB,, | Performed by: STUDENT IN AN ORGANIZED HEALTH CARE EDUCATION/TRAINING PROGRAM

## 2023-10-19 PROCEDURE — 99999 PR PBB SHADOW E&M-EST. PATIENT-LVL IV: ICD-10-PCS | Mod: PBBFAC,,, | Performed by: STUDENT IN AN ORGANIZED HEALTH CARE EDUCATION/TRAINING PROGRAM

## 2023-10-19 PROCEDURE — 1159F MED LIST DOCD IN RCRD: CPT | Mod: CPTII,S$GLB,, | Performed by: STUDENT IN AN ORGANIZED HEALTH CARE EDUCATION/TRAINING PROGRAM

## 2023-10-19 PROCEDURE — 3074F PR MOST RECENT SYSTOLIC BLOOD PRESSURE < 130 MM HG: ICD-10-PCS | Mod: CPTII,S$GLB,, | Performed by: STUDENT IN AN ORGANIZED HEALTH CARE EDUCATION/TRAINING PROGRAM

## 2023-10-19 PROCEDURE — 3008F BODY MASS INDEX DOCD: CPT | Mod: CPTII,S$GLB,, | Performed by: STUDENT IN AN ORGANIZED HEALTH CARE EDUCATION/TRAINING PROGRAM

## 2023-10-19 PROCEDURE — 71046 X-RAY EXAM CHEST 2 VIEWS: CPT | Mod: 26,,, | Performed by: RADIOLOGY

## 2023-10-19 PROCEDURE — 71046 X-RAY EXAM CHEST 2 VIEWS: CPT | Mod: TC

## 2023-10-19 PROCEDURE — 3078F DIAST BP <80 MM HG: CPT | Mod: CPTII,S$GLB,, | Performed by: STUDENT IN AN ORGANIZED HEALTH CARE EDUCATION/TRAINING PROGRAM

## 2023-10-19 PROCEDURE — 99213 OFFICE O/P EST LOW 20 MIN: CPT | Mod: S$GLB,,, | Performed by: STUDENT IN AN ORGANIZED HEALTH CARE EDUCATION/TRAINING PROGRAM

## 2023-10-19 PROCEDURE — 1159F PR MEDICATION LIST DOCUMENTED IN MEDICAL RECORD: ICD-10-PCS | Mod: CPTII,S$GLB,, | Performed by: STUDENT IN AN ORGANIZED HEALTH CARE EDUCATION/TRAINING PROGRAM

## 2023-10-19 PROCEDURE — 99999 PR PBB SHADOW E&M-EST. PATIENT-LVL IV: CPT | Mod: PBBFAC,,, | Performed by: STUDENT IN AN ORGANIZED HEALTH CARE EDUCATION/TRAINING PROGRAM

## 2023-10-19 PROCEDURE — 1160F PR REVIEW ALL MEDS BY PRESCRIBER/CLIN PHARMACIST DOCUMENTED: ICD-10-PCS | Mod: CPTII,S$GLB,, | Performed by: STUDENT IN AN ORGANIZED HEALTH CARE EDUCATION/TRAINING PROGRAM

## 2023-10-19 PROCEDURE — 3044F PR MOST RECENT HEMOGLOBIN A1C LEVEL <7.0%: ICD-10-PCS | Mod: CPTII,S$GLB,, | Performed by: STUDENT IN AN ORGANIZED HEALTH CARE EDUCATION/TRAINING PROGRAM

## 2023-10-19 PROCEDURE — 3044F HG A1C LEVEL LT 7.0%: CPT | Mod: CPTII,S$GLB,, | Performed by: STUDENT IN AN ORGANIZED HEALTH CARE EDUCATION/TRAINING PROGRAM

## 2023-10-19 PROCEDURE — 71046 XR CHEST PA AND LATERAL: ICD-10-PCS | Mod: 26,,, | Performed by: RADIOLOGY

## 2023-10-19 PROCEDURE — 3078F PR MOST RECENT DIASTOLIC BLOOD PRESSURE < 80 MM HG: ICD-10-PCS | Mod: CPTII,S$GLB,, | Performed by: STUDENT IN AN ORGANIZED HEALTH CARE EDUCATION/TRAINING PROGRAM

## 2023-10-19 PROCEDURE — 3008F PR BODY MASS INDEX (BMI) DOCUMENTED: ICD-10-PCS | Mod: CPTII,S$GLB,, | Performed by: STUDENT IN AN ORGANIZED HEALTH CARE EDUCATION/TRAINING PROGRAM

## 2023-10-19 PROCEDURE — 99213 PR OFFICE/OUTPT VISIT, EST, LEVL III, 20-29 MIN: ICD-10-PCS | Mod: S$GLB,,, | Performed by: STUDENT IN AN ORGANIZED HEALTH CARE EDUCATION/TRAINING PROGRAM

## 2023-10-19 RX ORDER — ALBUTEROL SULFATE 90 UG/1
1-2 AEROSOL, METERED RESPIRATORY (INHALATION) EVERY 6 HOURS PRN
Qty: 18 G | Refills: 3 | Status: SHIPPED | OUTPATIENT
Start: 2023-10-19 | End: 2024-10-18

## 2023-10-19 RX ORDER — PREDNISONE 20 MG/1
20 TABLET ORAL 2 TIMES DAILY
Qty: 10 TABLET | Refills: 0 | Status: SHIPPED | OUTPATIENT
Start: 2023-10-19 | End: 2023-10-24
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
## 2023-10-30 ENCOUNTER — HOSPITAL ENCOUNTER (OUTPATIENT)
Dept: PULMONOLOGY | Facility: CLINIC | Age: 28
Discharge: HOME | End: 2023-10-30
Payer: COMMERCIAL

## 2023-10-30 DIAGNOSIS — R06.2 WHEEZING: ICD-10-CM

## 2023-10-30 PROCEDURE — 94727 GAS DIL/WSHOT DETER LNG VOL: CPT | Mod: S$GLB,,, | Performed by: INTERNAL MEDICINE

## 2023-10-30 PROCEDURE — 94729 PR C02/MEMBANE DIFFUSE CAPACITY: ICD-10-PCS | Mod: S$GLB,,, | Performed by: INTERNAL MEDICINE

## 2023-10-30 PROCEDURE — 94727 PR PULM FUNCTION TEST BY GAS: ICD-10-PCS | Mod: S$GLB,,, | Performed by: INTERNAL MEDICINE

## 2023-10-30 PROCEDURE — 94010 BREATHING CAPACITY TEST: ICD-10-PCS | Mod: S$GLB,,, | Performed by: INTERNAL MEDICINE

## 2023-10-30 PROCEDURE — 94729 DIFFUSING CAPACITY: CPT | Mod: S$GLB,,, | Performed by: INTERNAL MEDICINE

## 2023-10-30 PROCEDURE — 94010 BREATHING CAPACITY TEST: CPT | Mod: S$GLB,,, | Performed by: INTERNAL MEDICINE

## 2023-11-12 ENCOUNTER — PATIENT MESSAGE (OUTPATIENT)
Dept: PRIMARY CARE CLINIC | Facility: CLINIC | Age: 28
End: 2023-11-12
Payer: COMMERCIAL

## 2023-11-13 NOTE — TELEPHONE ENCOUNTER
Albuterol is sometimes also useful in patients who deal with seasonal allergies without diagnosis of asthma.

## 2024-01-17 LAB
HBV SURFACE AG SERPL QL IA: NEGATIVE
HCV AB SERPL QL IA: NEGATIVE
HIV 1+2 AB+HIV1 P24 AG SERPL QL IA: NORMAL
RPR: NORMAL
RUBELLA IMMUNE STATUS: NORMAL

## 2024-04-05 ENCOUNTER — HOSPITAL ENCOUNTER (EMERGENCY)
Facility: HOSPITAL | Age: 29
Discharge: LEFT BEFORE DISCHARGE | End: 2024-04-05
Payer: COMMERCIAL

## 2024-04-05 VITALS
BODY MASS INDEX: 34.93 KG/M2 | HEART RATE: 77 BPM | DIASTOLIC BLOOD PRESSURE: 87 MMHG | RESPIRATION RATE: 20 BRPM | OXYGEN SATURATION: 98 % | SYSTOLIC BLOOD PRESSURE: 120 MMHG | HEIGHT: 61 IN | WEIGHT: 185 LBS | TEMPERATURE: 98 F

## 2024-04-05 LAB
BILIRUB UR QL STRIP: NEGATIVE
CLARITY UR: CLEAR
COLOR UR: YELLOW
GLUCOSE SERPL-MCNC: 89 MG/DL (ref 70–110)
GLUCOSE UR QL STRIP: NEGATIVE
HGB UR QL STRIP: NEGATIVE
KETONES UR QL STRIP: NEGATIVE
LEUKOCYTE ESTERASE UR QL STRIP: ABNORMAL
MICROSCOPIC COMMENT: NORMAL
NITRITE UR QL STRIP: NEGATIVE
PH UR STRIP: 7 [PH] (ref 5–8)
PROT UR QL STRIP: NEGATIVE
RBC #/AREA URNS HPF: 1 /HPF (ref 0–4)
SP GR UR STRIP: 1.02 (ref 1–1.03)
SQUAMOUS #/AREA URNS HPF: 7 /HPF
URN SPEC COLLECT METH UR: ABNORMAL
UROBILINOGEN UR STRIP-ACNC: NEGATIVE EU/DL
WBC #/AREA URNS HPF: 1 /HPF (ref 0–5)

## 2024-04-05 PROCEDURE — 81001 URINALYSIS AUTO W/SCOPE: CPT | Performed by: NURSE PRACTITIONER

## 2024-04-05 PROCEDURE — 99283 EMERGENCY DEPT VISIT LOW MDM: CPT

## 2024-04-05 PROCEDURE — 87086 URINE CULTURE/COLONY COUNT: CPT | Performed by: NURSE PRACTITIONER

## 2024-04-05 PROCEDURE — 25000003 PHARM REV CODE 250: Performed by: NURSE PRACTITIONER

## 2024-04-05 PROCEDURE — 82962 GLUCOSE BLOOD TEST: CPT

## 2024-04-05 RX ORDER — ACETAMINOPHEN 325 MG/1
650 TABLET ORAL
Status: COMPLETED | OUTPATIENT
Start: 2024-04-05 | End: 2024-04-05

## 2024-04-05 RX ADMIN — ACETAMINOPHEN 650 MG: 325 TABLET ORAL at 06:04

## 2024-04-05 NOTE — FIRST PROVIDER EVALUATION
Emergency Department TeleTriage Encounter Note      CHIEF COMPLAINT    Chief Complaint   Patient presents with    Headache      19w6d, headache since this morning. Took 1 aspirin around 11am        VITAL SIGNS   Initial Vitals [24 1747]   BP Pulse Resp Temp SpO2   120/87 77 20 98.1 °F (36.7 °C) 98 %      MAP       --            ALLERGIES    Review of patient's allergies indicates:   Allergen Reactions    Ketorolac Shortness Of Breath       PROVIDER TRIAGE NOTE  This is a teletriage evaluation of a 28 y.o. female presenting to the ED with c/o headache that began this morning, took ASA at 11a. Reports associated nausea and lightheaded. Limited physical exam via telehealth: The patient is awake, alert, answering questions appropriately and is not in respiratory distress. Initial orders will be placed and care will be transferred to an alternate provider when patient is roomed for a full evaluation. Any additional orders and the final disposition will be determined by that provider.         ORDERS  Labs Reviewed   CULTURE, URINE   URINALYSIS   POCT GLUCOSE MONITORING CONTINUOUS       ED Orders (720h ago, onward)      Start Ordered     Status Ordering Provider    24 1815 24 1804  acetaminophen tablet 650 mg  ED 1 Time         Ordered CONNIE CELIS    24 1805 24 1804  Urinalysis  Once         Ordered CONNIE CELIS    24 1805 24 1804  Urine Culture High Risk  Once         Ordered CONNIE CELIS    24 1805 24 1804  POCT glucose  Once         Ordered CONNIE CELIS    24 1805 24 1804  Assess fetal heart tones  Until discontinued         Ordered CONNIE CELIS              Virtual Visit Note: The provider triage portion of this emergency department evaluation and documentation was performed via saambaa, a HIPAA-compliant telemedicine application, in concert with a tele-presenter in the room. A face to face patient evaluation with one of my  colleagues will occur once the patient is placed in an emergency department room.      DISCLAIMER: This note was prepared with [employer]â„¢ voice recognition transcription software. Garbled syntax, mangled pronouns, and other bizarre constructions may be attributed to that software system.

## 2024-04-06 NOTE — ED NOTES
Bed: 15  Expected date:   Expected time:   Means of arrival:   Comments:  marko   Discharge Summary Note    MRN: [id number], Tracy Leyden is a 55 year old female admitted for   Chief Complaint   Patient presents with   • Facial Droop   .    Admission Dt/Tm     6/13/2020  3:31 PM  Discharge DT/TM  No discharge date for patient encounter.    Hospital Course        55 year old female presented yesterday with weakness and trouble speaking. 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 Her symptoms start abruptly yesterday while she was sitting on her porch knitting. She arrived as a stroke alert. She underwent CT and CTA head and neck which showed no acute abnormality. She was evaluated by neuro. Her symptoms resolved without intervention. No TPA was administered. MRI shows no acute ischemia. Patient seen and examined at bedside today. She is back to baseline and denies any current complaints. She follows with her neurologist Dr. Bc Ocampo.      On exam, awake, alert, no acute distress, lungs cta bl, EOMI, PERRLA, heart regular, abd soft. No LE edema. No focal deficits. CN 2-12 intact, motor strength normal throughout. Normal sensations. Finger to nose normal.     Labs imaging vitals reviewed, her HR is in the 50s. Tele is sinus rhythm. TSH low     A/p    Speech difficulty and non specific weakness, unclear etiology, unlikely CVA, could be TIA or complex migraines or psychosomatic, appreciate neuro recs. Symptoms resolved. Discharge with plans to f/u with Dr. Ocampo     Hypothyroidism - TSH low, decrease levothyroxine to 100mcg and recheck TSH in 6 weeks     Sinus bradycardia - decrease nadolol 40mg to once daily     Hx of vertebral and carotid artery dissection s/p stenting in 2013 - stable on imaging     CHF - compensated     HTN - BP stable, decrease nadolol to daily     HLD - statin     Ok to discharge today with f/u PCP And neuro in 1 week  Imaging  Mri Brain Wo Contrast    Result Date:  6/13/2020  EXAM: MRI BRAIN WO CONTRAST CLINICAL INDICATION: Acute stroke COMPARISON:  06/13/2020 TECHNIQUE: Multiplanar multisequence MR images acquired through the brain without intravenous contrast FINDINGS: No definite radiographic evidence of acute intracranial diffusion restriction.  No definite radiographic evidence of acute intracranial hemorrhage, mass effect, midline shift.  No cerebellar tonsillar ectopia.  Oral cavity and tongue base within normal limits.  Bilateral parotid glands within normal limits.  Tympanic and mastoid cavities, paranasal sinuses well aerated and free significant disease.  Postsurgical changes left globe.  Tympanic and mastoid cavities well aerated and free significant disease.  Remote linear regions of encephalomalacia within the right cerebral hemisphere, remote infarctions.  Dominant left intradural vertebral artery.  Grossly, intracranial flow voids appear within normal limits.  Ventricular system within normal limits for size, shape, configuration.  Scattered nonspecific intracranial T2/T4 hyperintensities within deep frontoparietal and periventricular matter, likely mild small vessel ischemic disease.  No acute intracranial hemorrhage.      1.  No acute intracranial infarction. 2.  Linear right cerebellar foci of encephalomalacia, remote infarctions. 3.  Mild intracranial small vessel ischemic disease. Electronically Signed by: ESDRAS MANCERA M.D. Signed on: 6/13/2020 9:46 PM     Cta Head And Neck W Contrast Level 1    Result Date: 6/13/2020  EXAM:  CTA HEAD AND NECK W CONTRAST LEVEL 1 CLINICAL INDICATION: Diffuse weakness COMPARISON:  01/05/2018 TECHNIQUE: Computed tomographic angiography of the vessels of head and neck were acquired utilizing a timed contrast-bolus technique.  Axial source images were acquired along with multiplanar reformatted and maximum intensity pixel projection images in the sagittal and coronal planes.  Volume or three-dimensional images and curved  reformatted images were acquired on a separate workstation. mA and/or kVp was adjusted for patient size. CONTRAST: 100 mL of Omnipaque 350 was administered intravenously. FINDINGS:  CTA NECK: The origins of the great vessels emanating from the cervicothoracic aortic arch are widely patent.   The cervical right vertebral artery is widely patent.  There is a stent within cervical mid left vertebral artery.  However, the left vertebral artery is widely patent.   Cervical carotid arteries bilaterally including the bifurcations and respective internal and external branches are widely patent.  There are no focal stenoses as per NASCET criteria.  CTA HEAD:  The anterior and posterior intracranial circulations are widely patent.  There are no severe focal stenoses or large branch occlusions.  There are no aneurysms or vascular malformations.  The basilar artery and vertebral basilar junction are normal.     1.  Widely patent cervical carotid and vertebral arteries.  2.  Normal CTA head. 3.  No interval change. Electronically Signed by: FERNANDA REYNOSO M.D. Signed on: 6/13/2020 4:09 PM     Ct Head Level 1    Result Date: 6/13/2020  INDICATION: Stroke COMPARISON: 01/05/2018 PROCEDURE: CT head without contrast. TECHNIQUE: Axial thin sections through the head were acquired without contrast.  Adjustment of the MA and KV was performed according to the patient size. FINDINGS: Small old cortical ischemic infarcts are present within the right cerebellar hemisphere, stable and unchanged.  There is a subtle old ischemic infarct within the posterolateral right medulla, unchanged. No new lesions are seen.  The ventricular system is normal.  Skull base is normal.  The mastoid air cells and tympanic cavities are clear.  There is a markedly thickened left temporal calvarium which may be caused by Paget's disease or possibly old traumatic injury with ossification of overlying soft tissues.     No acute intracranial abnormality and no interval  change. Electronically Signed by: FERNANDA REYNOSO M.D. Signed on: 6/13/2020 4:04 PM       Labs     Recent Results (from the past 24 hour(s))   CBC with Automated Differential    Collection Time: 06/13/20  3:38 PM   Result Value Ref Range    WBC 7.0 4.2 - 11.0 K/mcL    RBC 3.67 (L) 4.00 - 5.20 mil/mcL    HGB 12.0 12.0 - 15.5 g/dL    HCT 35.9 (L) 36.0 - 46.5 %    MCV 97.8 78.0 - 100.0 fl    MCH 32.7 26.0 - 34.0 pg    MCHC 33.4 32.0 - 36.5 g/dL    RDW-CV 11.3 11.0 - 15.0 %     140 - 450 K/mcL    NRBC 0 0 /100 WBC    DIFF TYPE AUTOMATED DIFFERENTIAL     Neutrophil 56 %    LYMPH 33 %    MONO 10 %    EOSIN 1 %    BASO 0 %    Percent Immature Granuloctyes 0 %    Absolute Neutrophil 3.9 1.8 - 7.7 K/mcL    Absolute Lymph 2.3 1.0 - 4.0 K/mcL    Absolute Mono 0.7 0.3 - 0.9 K/mcL    Absolute Eos 0.1 0.1 - 0.5 K/mcL    Absolute Baso 0.0 0.0 - 0.3 K/mcL    Absolute Immature Granulocytes 0.0 0 - 0.2 K/mcl   Comprehensive Metabolic Panel    Collection Time: 06/13/20  3:38 PM   Result Value Ref Range    Sodium 136 135 - 145 mmol/L    Potassium 3.4 3.4 - 5.1 mmol/L    Chloride 104 98 - 107 mmol/L    Carbon Dioxide 28 21 - 32 mmol/L    Anion Gap 7 (L) 10 - 20 mmol/L    Glucose 96 65 - 99 mg/dL    BUN 17 6 - 20 mg/dL    Creatinine 1.00 (H) 0.51 - 0.95 mg/dL    GFR Estimate,  73     GFR Estimate, Non  63     BUN/Creatinine Ratio 17 7 - 25    CALCIUM 8.8 8.4 - 10.2 mg/dL    TOTAL BILIRUBIN 0.8 0.2 - 1.0 mg/dL    AST/SGOT 21 <38 Units/L    ALT/SGPT 37 <64 Units/L    ALK PHOSPHATASE 88 45 - 117 Units/L    TOTAL PROTEIN 7.1 6.4 - 8.2 g/dL    Albumin 3.4 (L) 3.6 - 5.1 g/dL    GLOBULIN 3.7 2.0 - 4.0 g/dL    A/G Ratio, Serum 0.9 (L) 1.0 - 2.4   Glycohemoglobin    Collection Time: 06/13/20  3:38 PM   Result Value Ref Range    Hemoglobin A1C 5.3 4.5 - 5.6 %   Lipid Panel With Reflex    Collection Time: 06/13/20  3:38 PM   Result Value Ref Range    CHOLESTEROL 151 <200 mg/dL    CALCULATED LDL 67 <130 mg/dL     HDL 70 >49 mg/dL    TRIGLYCERIDE 72 <150 mg/dL    CALCULATED NON HDL 81 mg/dL    CHOL/HDL 2.2 <4.5   Prothrombin Time    Collection Time: 06/13/20  3:38 PM   Result Value Ref Range    PROTIME 11.0 9.7 - 11.8 sec    INR 1.0    Partial Thromboplastin Time    Collection Time: 06/13/20  3:38 PM   Result Value Ref Range    PTT 25 22 - 32 sec   Troponin I Ultra Sensitive    Collection Time: 06/13/20  3:38 PM   Result Value Ref Range    TROPONIN I <0.02 <0.05 ng/mL   Metered blood glucose    Collection Time: 06/13/20  3:38 PM   Result Value Ref Range    Glucose Bedside POC 87 70 - 99 mg/dL   Electrocardiogram 12-Lead    Collection Time: 06/13/20  4:14 PM   Result Value Ref Range    Ventricular Rate EKG/Min (BPM) 67     Atrial Rate (BPM) 67     SD-Interval (MSEC) 162     QRS-Interval (MSEC) 151     QT-Interval (MSEC) 471     QTc 498     P Axis (Degrees) 36     R Axis (Degrees) 9     T Axis (Degrees) 8     REPORT TEXT Sinus rhythm  Right bundle branch block      2019 Novel Coronavirus (SARS-CoV-2)    Collection Time: 06/13/20  4:40 PM   Result Value Ref Range    SOURCE, 2019 NOVEL CORONAVIRUS (SARS-COV-2) NASOPHARYNGEAL     SARS-CoV-2 by PCR NOT DETECTED NOT DETECTED    Isolation Guidelines       CBC with Automated Differential    Collection Time: 06/14/20  5:31 AM   Result Value Ref Range    WBC 5.6 4.2 - 11.0 K/mcL    RBC 3.47 (L) 4.00 - 5.20 mil/mcL    HGB 11.5 (L) 12.0 - 15.5 g/dL    HCT 34.4 (L) 36.0 - 46.5 %    MCV 99.1 78.0 - 100.0 fl    MCH 33.1 26.0 - 34.0 pg    MCHC 33.4 32.0 - 36.5 g/dL    RDW-CV 11.4 11.0 - 15.0 %     140 - 450 K/mcL    NRBC 0 0 /100 WBC    DIFF TYPE AUTOMATED DIFFERENTIAL     Neutrophil 50 %    LYMPH 39 %    MONO 9 %    EOSIN 1 %    BASO 1 %    Percent Immature Granuloctyes 0 %    Absolute Neutrophil 2.8 1.8 - 7.7 K/mcL    Absolute Lymph 2.2 1.0 - 4.0 K/mcL    Absolute Mono 0.5 0.3 - 0.9 K/mcL    Absolute Eos 0.1 0.1 - 0.5 K/mcL    Absolute Baso 0.0 0.0 - 0.3 K/mcL    Absolute Immature  Granulocytes 0.0 0 - 0.2 K/mcl   Basic Metabolic Panel    Collection Time: 06/14/20  5:31 AM   Result Value Ref Range    Sodium 142 135 - 145 mmol/L    Potassium 3.9 3.4 - 5.1 mmol/L    Chloride 110 (H) 98 - 107 mmol/L    Carbon Dioxide 29 21 - 32 mmol/L    Anion Gap 7 (L) 10 - 20 mmol/L    Glucose 84 65 - 99 mg/dL    BUN 12 6 - 20 mg/dL    Creatinine 0.89 0.51 - 0.95 mg/dL    GFR Estimate,  85     GFR Estimate, Non African American 73     BUN/Creatinine Ratio 14 7 - 25    CALCIUM 8.7 8.4 - 10.2 mg/dL   Thyroid Stimulating Hormone Reflex    Collection Time: 06/14/20  9:59 AM   Result Value Ref Range    TSH 0.011 (L) 0.350 - 5.000 mcUnits/mL   Free T4 Reflex    Collection Time: 06/14/20  9:59 AM   Result Value Ref Range    T4, FREE 1.7 (H) 0.8 - 1.5 ng/dL       Pathology  * Cannot find OR log *    Test Results Pending At Discharge      Pertinent Physical Exam At Time of Discharge  Physical Exam  Vitals signs and nursing note reviewed.   Constitutional:       Appearance: Normal appearance.   HENT:      Head: Normocephalic and atraumatic.   Cardiovascular:      Rate and Rhythm: Normal rate and regular rhythm.      Pulses: Normal pulses.      Heart sounds: Normal heart sounds.   Pulmonary:      Effort: Pulmonary effort is normal.      Breath sounds: Normal breath sounds.   Abdominal:      General: Abdomen is flat.      Palpations: Abdomen is soft.   Skin:     General: Skin is warm.   Neurological:      General: No focal deficit present.      Mental Status: She is alert and oriented to person, place, and time. Mental status is at baseline.      Cranial Nerves: No cranial nerve deficit.      Sensory: No sensory deficit.      Motor: No weakness.      Coordination: Coordination normal.   Psychiatric:         Mood and Affect: Mood normal.         Discharge Diagnosis  See above     Plan  No problem-specific Assessment & Plan notes found for this encounter.      Discharge Instructions    Discharge  Medications       Summary of your Discharge Medications      Take these Medications      Details   aspirin 325 MG EC tablet   Take 325 mg by mouth daily.     atorvastatin 10 MG tablet  Commonly known as:  LIPITOR   TAKE 1 TABLET DAILY     busPIRone 10 MG tablet  Commonly known as:  BUSPAR   Take 10 mg by mouth 2 times daily.     clonazePAM 0.5 MG tablet  Commonly known as:  KlonoPIN   Take 1 tablet by mouth 2 times daily as needed for Anxiety.     cyclobenzaprine 10 MG tablet  Commonly known as:  FLEXERIL   TAKE 2 TABLETS AT BEDTIME     fluoxetine 40 MG capsule  Commonly known as:  PROzac   Take 1 capsule by mouth daily.     fluticasone 50 MCG/ACT nasal spray  Commonly known as:  FLONASE   Spray 2 sprays in each nostril daily.     ketorolac 10 MG tablet  Commonly known as:  TORADOL   Take 10 mg by mouth every 6 hours as needed.     lamoTRIgine 25 MG tablet  Commonly known as:  LaMICtal   TAKE 1 TABLET TWICE A DAY     levothyroxine 100 MCG tablet  Start taking on:  Elisabeth 15, 2020   Take 1 tablet by mouth daily (before breakfast). Do not start before Elisabeth 15, 2020.     MIRALAX PO   Take by mouth as needed.     nadolol 40 MG tablet  Commonly known as:  CORGARD   Take 1 tablet by mouth daily.     omeprazole 20 MG tablet   Take 20 mg by mouth as needed.     potassium CHLORIDE 20 MEQ huma ER tablet  Commonly known as:  Klor-Con M   TAKE 1 TABLET DAILY     torsemide 20 MG tablet  Commonly known as:  DEMADEX   Take 1 tablet by mouth 2 times daily.     traZODone 50 MG tablet  Commonly known as:  DESYREL   TAKE 1 TABLET NIGHTLY     Vitamin D 50 mcg (2,000 units) tablet   Take by mouth daily.            Smoking Cessation  Counseling given: Not Answered      Primary Care Physician  DO Vicki López DO  6/14/2020 12:36 PM

## 2024-04-07 LAB
BACTERIA UR CULT: NORMAL
BACTERIA UR CULT: NORMAL

## 2024-04-07 NOTE — PROGRESS NOTES
Please call patient if she has any urinary symptoms she must have repeat urinalysis her UA was inconclusive

## 2024-05-29 ENCOUNTER — LAB VISIT (OUTPATIENT)
Dept: LAB | Facility: HOSPITAL | Age: 29
End: 2024-05-29
Attending: OBSTETRICS & GYNECOLOGY
Payer: COMMERCIAL

## 2024-05-29 DIAGNOSIS — O60.03 PRETERM LABOR WITHOUT DELIVERY, THIRD TRIMESTER: Primary | ICD-10-CM

## 2024-05-29 LAB — FIBRONECTIN FETAL SPEC QL: NEGATIVE

## 2024-05-29 PROCEDURE — 82731 ASSAY OF FETAL FIBRONECTIN: CPT | Performed by: OBSTETRICS & GYNECOLOGY

## 2024-06-30 ENCOUNTER — HOSPITAL ENCOUNTER (OUTPATIENT)
Facility: HOSPITAL | Age: 29
Discharge: HOME | End: 2024-06-30
Attending: OBSTETRICS & GYNECOLOGY | Admitting: OBSTETRICS & GYNECOLOGY
Payer: COMMERCIAL

## 2024-06-30 VITALS
OXYGEN SATURATION: 100 % | DIASTOLIC BLOOD PRESSURE: 57 MMHG | RESPIRATION RATE: 16 BRPM | SYSTOLIC BLOOD PRESSURE: 107 MMHG | TEMPERATURE: 98 F | HEART RATE: 73 BPM

## 2024-06-30 DIAGNOSIS — O36.8130 DECREASED FETAL MOVEMENTS IN THIRD TRIMESTER: Primary | ICD-10-CM

## 2024-06-30 DIAGNOSIS — O26.899 ABDOMINAL PAIN AFFECTING PREGNANCY: ICD-10-CM

## 2024-06-30 DIAGNOSIS — R10.9 ABDOMINAL PAIN AFFECTING PREGNANCY: ICD-10-CM

## 2024-06-30 DIAGNOSIS — O36.8130 DECREASED FETAL MOVEMENT AFFECTING MANAGEMENT OF PREGNANCY IN THIRD TRIMESTER: ICD-10-CM

## 2024-06-30 PROBLEM — Z3A.32 32 WEEKS GESTATION OF PREGNANCY: Status: ACTIVE | Noted: 2024-06-30

## 2024-06-30 PROCEDURE — G0378 HOSPITAL OBSERVATION PER HR: HCPCS

## 2024-06-30 PROCEDURE — 99211 OFF/OP EST MAY X REQ PHY/QHP: CPT | Mod: 25

## 2024-07-01 NOTE — DISCHARGE INSTRUCTIONS
Keep your scheduled appointment with your provider.    Call your Doctor if you have any of the following:  Temperature above 100 degrees  Nausea, vomiting and/or diarrhea  Severe headache, dizziness, or blurred vision  Notable increase in swelling of hands or feet  Notable swelling of face and lips  Difficulty, pain or burning with urination  Foul smelling vaginal discharge  Decreased fetal movement    Come to the hospital if you have any of the following symptoms:  Your water breaks  More than 4-6 contractions in 1 hour for 2 or more hours  Vaginal bleeding like a period    After 28 weeks, you should feel 10 distinct fetal movements within a 2 hour period.    It is recommended that you drink 1/2 a gallon of water each day.  Tea, Soda and Juice are  in addition to this.    Labor and Delivery Phone number: [phone]    If you need to be seen on Labor and delivery between the hours of 6pm and 7am, please enter through the Emergency room entrance.
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
## 2024-07-28 ENCOUNTER — OFFICE VISIT (OUTPATIENT)
Dept: URGENT CARE | Facility: CLINIC | Age: 29
End: 2024-07-28
Payer: COMMERCIAL

## 2024-07-28 VITALS
RESPIRATION RATE: 18 BRPM | TEMPERATURE: 99 F | SYSTOLIC BLOOD PRESSURE: 107 MMHG | HEART RATE: 75 BPM | HEIGHT: 61 IN | BODY MASS INDEX: 34.93 KG/M2 | OXYGEN SATURATION: 98 % | DIASTOLIC BLOOD PRESSURE: 60 MMHG | WEIGHT: 185 LBS

## 2024-07-28 DIAGNOSIS — H60.502 ACUTE OTITIS EXTERNA OF LEFT EAR, UNSPECIFIED TYPE: Primary | ICD-10-CM

## 2024-07-28 PROCEDURE — 99204 OFFICE O/P NEW MOD 45 MIN: CPT | Mod: S$GLB,,,

## 2024-07-28 RX ORDER — CIPROFLOXACIN AND DEXAMETHASONE 3; 1 MG/ML; MG/ML
4 SUSPENSION/ DROPS AURICULAR (OTIC) 2 TIMES DAILY
Qty: 7.5 ML | Refills: 0 | Status: SHIPPED | OUTPATIENT
Start: 2024-07-28 | End: 2024-08-04

## 2024-07-28 NOTE — PROGRESS NOTES
"Subjective:      Patient ID: Bhargavi Hitchcock is a 28 y.o. female.    Vitals:  height is 5' 1" (1.549 m) and weight is 83.9 kg (185 lb). Her oral temperature is 98.9 °F (37.2 °C). Her blood pressure is 107/60 and her pulse is 75. Her respiration is 18 and oxygen saturation is 98%.     Chief Complaint: Otalgia    Left ear pain, and muffled hearing that has been progressively worsening over the last week.  Initially started off as a lesion on the external ear that she was attempting to perform wound care on.  She was using peroxide to clean, and then irrigating with saline.  She believes some of the saline became entrapped within EAC.  She then had the sensation drainage, but never noticed any drainage.  She denies recent upper respiratory infection, coughing, or fever.        Constitution: Negative for fever.   HENT:  Positive for ear pain, ear discharge and hearing loss.       Objective:     Physical Exam   Constitutional: She is oriented to person, place, and time. She is cooperative.   HENT:   Head: Normocephalic and atraumatic.   Ears:   Right Ear: Tympanic membrane, external ear and ear canal normal. Decreased hearing is noted.   Left Ear: There is drainage, swelling and tenderness. Tympanic membrane is not retracted and not bulging.  No middle ear effusion. Decreased hearing is noted.   Ears:    Nose: Nose normal.   Mouth/Throat: Uvula is midline, oropharynx is clear and moist and mucous membranes are normal. Mucous membranes are moist. Oropharynx is clear.   Canal edema with white clumpy and caseous material.      Comments: Canal edema with white clumpy and caseous material.  Eyes: Conjunctivae and lids are normal. Pupils are equal, round, and reactive to light. Extraocular movement intact   Neck: Trachea normal and phonation normal. Neck supple.   Cardiovascular: Normal rate, regular rhythm, normal heart sounds and normal pulses.   Pulmonary/Chest: Effort normal and breath sounds normal.   Abdominal: Normal " appearance.   Neurological: no focal deficit. She is alert, oriented to person, place, and time and at baseline. She has normal motor skills, normal sensation and intact cranial nerves (2-12). Gait and coordination normal. GCS eye subscore is 4. GCS verbal subscore is 5. GCS motor subscore is 6.   Skin: Skin is warm and dry. Capillary refill takes 2 to 3 seconds.   Psychiatric: She experiences Normal attention and Normal perception. Her speech is normal and behavior is normal. Mood, judgment and thought content normal.   Nursing note and vitals reviewed.      Assessment:     1. Acute otitis externa of left ear, unspecified type        Plan:       Acute otitis externa of left ear, unspecified type  -     ciprofloxacin-dexAMETHasone 0.3-0.1% (CIPRODEX) 0.3-0.1 % DrpS; Place 4 drops into the left ear 2 (two) times daily. for 7 days  Dispense: 7.5 mL; Refill: 0    See granulating and healing wound inside the external ear.  Also diffuse canal edema with mild tenderness over the tragus.  No pain over mastoid.  No lymphadenopathy present.

## 2024-08-01 LAB — PRENATAL STREP B CULTURE: NEGATIVE

## 2024-08-10 ENCOUNTER — HOSPITAL ENCOUNTER (OUTPATIENT)
Facility: HOSPITAL | Age: 29
Discharge: HOME | End: 2024-08-10
Attending: OBSTETRICS & GYNECOLOGY | Admitting: SPECIALIST
Payer: COMMERCIAL

## 2024-08-10 VITALS
TEMPERATURE: 99 F | HEART RATE: 71 BPM | SYSTOLIC BLOOD PRESSURE: 107 MMHG | OXYGEN SATURATION: 98 % | RESPIRATION RATE: 18 BRPM | DIASTOLIC BLOOD PRESSURE: 63 MMHG

## 2024-08-10 DIAGNOSIS — R53.83 FATIGUE: ICD-10-CM

## 2024-08-10 PROBLEM — O21.9 NAUSEA AND VOMITING IN PREGNANCY: Status: ACTIVE | Noted: 2024-08-10

## 2024-08-10 PROBLEM — Z3A.38 38 WEEKS GESTATION OF PREGNANCY: Status: ACTIVE | Noted: 2024-08-10

## 2024-08-10 LAB — SARS-COV-2 RDRP RESP QL NAA+PROBE: NEGATIVE

## 2024-08-10 PROCEDURE — 99211 OFF/OP EST MAY X REQ PHY/QHP: CPT

## 2024-08-10 PROCEDURE — U0002 COVID-19 LAB TEST NON-CDC: HCPCS | Performed by: SPECIALIST
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
## 2024-08-11 NOTE — SUBJECTIVE & OBJECTIVE
Obstetric HPI:  Patient reports prodromal labor contractions, active fetal movement, absent vaginal bleeding , absent loss of fluid      Objective:     Vital Signs (Most Recent):  Temp: 99.1 °F (37.3 °C) (08/10/24 1936)  Pulse: 71 (08/10/24 1936)  Resp: 18 (08/10/24 1936)  BP: 107/63 (08/10/24 1936)  SpO2: 98 % (08/10/24 1936) Vital Signs (24h Range):  Temp:  [99.1 °F (37.3 °C)] 99.1 °F (37.3 °C)  Pulse:  [71] 71  Resp:  [18] 18  SpO2:  [98 %] 98 %  BP: (107)/(63) 107/63        There is no height or weight on file to calculate BMI.    FHT: 135 Cat 1 (reassuring)  TOCO:  Q 4-5 minutes mild to moderate by palpation    No intake or output data in the 24 hours ending 08/10/24 2046    Cervical Exam:  Dilation:  1  Effacement:  thick  Station: -3  Presentation: Vertex     Significant Labs:  Recent Lab Results         08/10/24  1952        SARS-CoV-2 RNA, Amplification, Qual Negative  Comment: This test utilizes isothermal nucleic acid amplification technology   to   detect the SARS-CoV-2 RdRp nucleic acid segment. The analytical   sensitivity   (limit of detection) is 500 copies/swab.     A POSITIVE result is indicative of the presence of SARS-CoV-2 RNA;   clinical   correlation with patient history and other diagnostic information is   necessary to determine patient infection status.    A NEGATIVE result means that SARS-CoV-2 nucleic acids are not present   above   the limit of detection. A NEGATIVE result should be treated as   presumptive.   It does not rule out the possibility of COVID-19 and should not be   the sole   basis for treatment decisions. If COVID-19 is strongly suspected   based on   clinical and exposure history, re-testing using an alternate   molecular assay   should be considered.     This test is FDA approved.Performance characteristics of this test   has been   independently verified by [facility] in conjunction   with   [facility] Department of Pathology and Laboratory    Medicine.                 Physical Exam:   Constitutional: She is oriented to person, place, and time. She appears well-developed and well-nourished.       Cardiovascular:  Normal rate, regular rhythm, normal heart sounds and intact distal pulses.           Pulse Score: 1+   Pulmonary/Chest: Effort normal and breath sounds normal.        Abdominal: Soft. Bowel sounds are normal. There is no abdominal tenderness. There is no guarding.     Genitourinary:    Vagina and uterus normal.   No vaginal discharge in the vagina.           Musculoskeletal: Normal range of motion and moves all extremeties.       Neurological: She is alert and oriented to person, place, and time. She has normal reflexes.    Skin: Skin is warm and dry.    Psychiatric: She has a normal mood and affect. Her behavior is normal. Judgment and thought content normal.       Review of Systems   Constitutional:  Negative for fever.   Respiratory:  Negative for cough, shortness of breath and wheezing.    Gastrointestinal:  Positive for nausea. Negative for abdominal pain, diarrhea and vomiting.   Genitourinary:  Negative for pelvic pain, urgency and vaginal discharge.   Musculoskeletal:  Positive for myalgias.   Neurological:  Negative for syncope and headaches.

## 2024-08-11 NOTE — HOSPITAL COURSE
"Admitted to outpatient, Covid negative. Declines IVF, nausea meds, and po hydration. "Just wanted to get checked out. SVE per patient request.   "

## 2024-08-11 NOTE — PROGRESS NOTES
"Formerly [facility]  Obstetrics  Antepartum Progress Note    Patient Name: Bhargavi Hitchcock  MRN: [id number]  Admission Date: 8/10/2024  Hospital Length of Stay: 0 days  Attending Physician: Dhara Castellanos MD  Primary Care Provider: Lorri Raymond MD    Subjective:     Principal Problem:<principal problem not specified>    HPI:  28 year old  with complaints of generalized malaise, dizzy, and nausea that started as she was getting out of the pool this afternoon. Denies vomiting or fever. Reports prodromal labor symptoms for the last 2 weeks. Did Miles circuit today. Reports good fetal movement. Denies VB or LOF.     Hospital Course:  Admitted to outpatient, Covid negative. Declines IVF, nausea meds, and po hydration. "Just wanted to get checked out. SVE per patient request.     Obstetric HPI:  Patient reports prodromal labor contractions, active fetal movement, absent vaginal bleeding , absent loss of fluid      Objective:     Vital Signs (Most Recent):  Temp: 99.1 °F (37.3 °C) (08/10/24 1936)  Pulse: 71 (08/10/24 1936)  Resp: 18 (08/10/24 1936)  BP: 107/63 (08/10/24 1936)  SpO2: 98 % (08/10/24 1936) Vital Signs (24h Range):  Temp:  [99.1 °F (37.3 °C)] 99.1 °F (37.3 °C)  Pulse:  [71] 71  Resp:  [18] 18  SpO2:  [98 %] 98 %  BP: (107)/(63) 107/63        There is no height or weight on file to calculate BMI.    FHT: 135 Cat 1 (reassuring)  TOCO:  Q 4-5 minutes mild to moderate by palpation    No intake or output data in the 24 hours ending 08/10/24 2046    Cervical Exam:  Dilation:  1  Effacement:  thick  Station: -3  Presentation: Vertex     Significant Labs:  Recent Lab Results         08/10/24  1952        SARS-CoV-2 RNA, Amplification, Qual Negative  Comment: This test utilizes isothermal nucleic acid amplification technology   to   detect the SARS-CoV-2 RdRp nucleic acid segment. The analytical   sensitivity   (limit of detection) is 500 copies/swab.     A POSITIVE result is indicative of the presence of " SARS-CoV-2 RNA;   clinical   correlation with patient history and other diagnostic information is   necessary to determine patient infection status.    A NEGATIVE result means that SARS-CoV-2 nucleic acids are not present   above   the limit of detection. A NEGATIVE result should be treated as   presumptive.   It does not rule out the possibility of COVID-19 and should not be   the sole   basis for treatment decisions. If COVID-19 is strongly suspected   based on   clinical and exposure history, re-testing using an alternate   molecular assay   should be considered.     This test is FDA approved.Performance characteristics of this test   has been   independently verified by [facility] in conjunction   with   [facility] Department of Pathology and Laboratory   Medicine.                 Physical Exam:   Constitutional: She is oriented to person, place, and time. She appears well-developed and well-nourished.       Cardiovascular:  Normal rate, regular rhythm, normal heart sounds and intact distal pulses.           Pulse Score: 1+   Pulmonary/Chest: Effort normal and breath sounds normal.        Abdominal: Soft. Bowel sounds are normal. There is no abdominal tenderness. There is no guarding.     Genitourinary:    Vagina and uterus normal.   No vaginal discharge in the vagina.           Musculoskeletal: Normal range of motion and moves all extremeties.       Neurological: She is alert and oriented to person, place, and time. She has normal reflexes.    Skin: Skin is warm and dry.    Psychiatric: She has a normal mood and affect. Her behavior is normal. Judgment and thought content normal.       Review of Systems   Constitutional:  Negative for fever.   Respiratory:  Negative for cough, shortness of breath and wheezing.    Gastrointestinal:  Positive for nausea. Negative for abdominal pain, diarrhea and vomiting.   Genitourinary:  Negative for pelvic pain, urgency and vaginal discharge.    Musculoskeletal:  Positive for myalgias.   Neurological:  Negative for syncope and headaches.     Assessment/Plan:     28 y.o. female  at 38w0d for:    Nausea and vomiting in pregnancy  Covid negative  Declines zofran or IVF  Requesting discharge home    38 weeks gestation of pregnancy  Cervix per request - 1.5-2/50/-3  Contractions intermittent  Discussed early labor vs. [place] [facility]  Labor precautions  [place].      Elsie Otero CNM  Obstetrics  [facility] hospital

## 2024-08-11 NOTE — HPI
28 year old  with complaints of generalized malaise, dizzy, and nausea that started as she was getting out of the pool this afternoon. Denies vomiting or fever. Reports prodromal labor symptoms for the last 2 weeks. Did Miles circuit today. Reports good fetal movement. Denies VB or LOF.
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
## 2024-08-11 NOTE — ASSESSMENT & PLAN NOTE
Cervix per request - 1.5-2/50/-3  Contractions intermittent  Discussed early labor vs. [place] [facility]  Labor precautions  [facility].

## 2024-08-11 NOTE — DISCHARGE INSTRUCTIONS
Keep your scheduled appointment with your provider.    Call your Doctor if you have any of the following:  Temperature above 100 degrees  Nausea, vomiting and/or diarrhea  Severe headache, dizziness, or blurred vision  Notable increase in swelling of hands or feet  Notable swelling of face and lips  Difficulty, pain or burning with urination  Foul smelling vaginal discharge  Decreased fetal movement    Come to the hospital if you have any of the following symptoms:  Your water breaks  More than 4-6 contractions in 1 hour for 2 or more hours  Vaginal bleeding like a period    After 28 weeks, you should feel 10 distinct fetal movements within a 2 hour period.    It is recommended that you drink 1/2 a gallon of water each day.  Tea, Soda and Juice are  in addition to this.    Labor and Delivery Phone number: [phone]    If you need to be seen on Labor and delivery between the hours of 6pm and 7am, please enter through the Emergency room entrance.

## 2024-08-22 DIAGNOSIS — Z34.90 ENCOUNTER FOR ELECTIVE INDUCTION OF LABOR: Primary | ICD-10-CM

## 2024-08-23 ENCOUNTER — HOSPITAL ENCOUNTER (INPATIENT)
Facility: HOSPITAL | Age: 29
LOS: 1 days | Discharge: HOME | End: 2024-08-24
Attending: OBSTETRICS & GYNECOLOGY | Admitting: OBSTETRICS & GYNECOLOGY
Payer: COMMERCIAL

## 2024-08-23 ENCOUNTER — ANESTHESIA EVENT (OUTPATIENT)
Dept: OBSTETRICS AND GYNECOLOGY | Facility: HOSPITAL | Age: 29
End: 2024-08-23
Payer: COMMERCIAL

## 2024-08-23 ENCOUNTER — ANESTHESIA (OUTPATIENT)
Dept: OBSTETRICS AND GYNECOLOGY | Facility: HOSPITAL | Age: 29
End: 2024-08-23
Payer: COMMERCIAL

## 2024-08-23 DIAGNOSIS — Z34.90 ENCOUNTER FOR INDUCTION OF LABOR: Primary | ICD-10-CM

## 2024-08-23 DIAGNOSIS — Z34.90 ENCOUNTER FOR ELECTIVE INDUCTION OF LABOR: ICD-10-CM

## 2024-08-23 DIAGNOSIS — Z34.90 PREGNANCY: ICD-10-CM

## 2024-08-23 LAB
ABO + RH BLD: NORMAL
AMPHET+METHAMPHET UR QL: NEGATIVE
BACTERIA #/AREA URNS HPF: NORMAL /HPF
BARBITURATES UR QL SCN>200 NG/ML: NEGATIVE
BASOPHILS # BLD AUTO: 0.05 K/UL (ref 0–0.2)
BASOPHILS NFR BLD: 0.5 % (ref 0–1.9)
BENZODIAZ UR QL SCN>200 NG/ML: NEGATIVE
BILIRUB UR QL STRIP: NEGATIVE
BLD GP AB SCN CELLS X3 SERPL QL: NORMAL
BUPRENORPHINE UR QL: NEGATIVE
BZE UR QL SCN: NEGATIVE
CANNABINOIDS UR QL SCN: NEGATIVE
CLARITY UR: CLEAR
COLOR UR: YELLOW
CREAT UR-MCNC: 284.6 MG/DL (ref 15–325)
DIFFERENTIAL METHOD BLD: ABNORMAL
EOSINOPHIL # BLD AUTO: 0.3 K/UL (ref 0–0.5)
EOSINOPHIL NFR BLD: 2.3 % (ref 0–8)
ERYTHROCYTE [DISTWIDTH] IN BLOOD BY AUTOMATED COUNT: 14.3 % (ref 11.5–14.5)
FENTANYL UR QL SCN: NORMAL
GLUCOSE UR QL STRIP: NEGATIVE
HCT VFR BLD AUTO: 28.9 % (ref 37–48.5)
HGB BLD-MCNC: 9 G/DL (ref 12–16)
HGB UR QL STRIP: NEGATIVE
HYALINE CASTS #/AREA URNS LPF: 0 /LPF
IMM GRANULOCYTES # BLD AUTO: 0.03 K/UL (ref 0–0.04)
IMM GRANULOCYTES NFR BLD AUTO: 0.3 % (ref 0–0.5)
KETONES UR QL STRIP: ABNORMAL
LEUKOCYTE ESTERASE UR QL STRIP: NEGATIVE
LYMPHOCYTES # BLD AUTO: 2.6 K/UL (ref 1–4.8)
LYMPHOCYTES NFR BLD: 24.2 % (ref 18–48)
MCH RBC QN AUTO: 24.1 PG (ref 27–31)
MCHC RBC AUTO-ENTMCNC: 31.1 G/DL (ref 32–36)
MCV RBC AUTO: 77 FL (ref 82–98)
MICROSCOPIC COMMENT: NORMAL
MONOCYTES # BLD AUTO: 0.8 K/UL (ref 0.3–1)
MONOCYTES NFR BLD: 6.9 % (ref 4–15)
NEUTROPHILS # BLD AUTO: 7.1 K/UL (ref 1.8–7.7)
NEUTROPHILS NFR BLD: 65.8 % (ref 38–73)
NITRITE UR QL STRIP: NEGATIVE
NRBC BLD-RTO: 0 /100 WBC
OPIATES UR QL SCN: NEGATIVE
PCP UR QL SCN>25 NG/ML: NEGATIVE
PH UR STRIP: 8 [PH] (ref 5–8)
PLATELET # BLD AUTO: 265 K/UL (ref 150–450)
PMV BLD AUTO: 11.3 FL (ref 9.2–12.9)
PROT UR QL STRIP: ABNORMAL
RBC # BLD AUTO: 3.74 M/UL (ref 4–5.4)
RBC #/AREA URNS HPF: 2 /HPF (ref 0–4)
SP GR UR STRIP: >1.03 (ref 1–1.03)
SQUAMOUS #/AREA URNS HPF: 3 /HPF
TOXICOLOGY INFORMATION: NORMAL
TREPONEMA PALLIDUM IGG+IGM AB [PRESENCE] IN SERUM OR PLASMA BY IMMUNOASSAY: NONREACTIVE
URN SPEC COLLECT METH UR: ABNORMAL
UROBILINOGEN UR STRIP-ACNC: ABNORMAL EU/DL
WBC # BLD AUTO: 10.8 K/UL (ref 3.9–12.7)
WBC #/AREA URNS HPF: 2 /HPF (ref 0–5)

## 2024-08-23 PROCEDURE — 80307 DRUG TEST PRSMV CHEM ANLYZR: CPT | Performed by: OBSTETRICS & GYNECOLOGY

## 2024-08-23 PROCEDURE — 63600175 PHARM REV CODE 636 W HCPCS: Performed by: OBSTETRICS & GYNECOLOGY

## 2024-08-23 PROCEDURE — 81001 URINALYSIS AUTO W/SCOPE: CPT | Performed by: OBSTETRICS & GYNECOLOGY

## 2024-08-23 PROCEDURE — 72100002 HC LABOR CARE, 1ST 8 HOURS

## 2024-08-23 PROCEDURE — 80354 DRUG SCREENING FENTANYL: CPT | Performed by: OBSTETRICS & GYNECOLOGY

## 2024-08-23 PROCEDURE — 72200005 HC VAGINAL DELIVERY LEVEL II

## 2024-08-23 PROCEDURE — 86901 BLOOD TYPING SEROLOGIC RH(D): CPT | Performed by: OBSTETRICS & GYNECOLOGY

## 2024-08-23 PROCEDURE — 80348 DRUG SCREENING BUPRENORPHINE: CPT | Performed by: OBSTETRICS & GYNECOLOGY

## 2024-08-23 PROCEDURE — 86593 SYPHILIS TEST NON-TREP QUANT: CPT | Performed by: OBSTETRICS & GYNECOLOGY

## 2024-08-23 PROCEDURE — 3E033VJ INTRODUCTION OF OTHER HORMONE INTO PERIPHERAL VEIN, PERCUTANEOUS APPROACH: ICD-10-PCS | Performed by: OBSTETRICS & GYNECOLOGY

## 2024-08-23 PROCEDURE — 12000002 HC ACUTE/MED SURGE SEMI-PRIVATE ROOM

## 2024-08-23 PROCEDURE — 59409 OBSTETRICAL CARE: CPT | Mod: ,,, | Performed by: STUDENT IN AN ORGANIZED HEALTH CARE EDUCATION/TRAINING PROGRAM

## 2024-08-23 PROCEDURE — C1751 CATH, INF, PER/CENT/MIDLINE: HCPCS | Performed by: STUDENT IN AN ORGANIZED HEALTH CARE EDUCATION/TRAINING PROGRAM

## 2024-08-23 PROCEDURE — 0HQ9XZZ REPAIR PERINEUM SKIN, EXTERNAL APPROACH: ICD-10-PCS | Performed by: OBSTETRICS & GYNECOLOGY

## 2024-08-23 PROCEDURE — 51702 INSERT TEMP BLADDER CATH: CPT

## 2024-08-23 PROCEDURE — 62326 NJX INTERLAMINAR LMBR/SAC: CPT | Performed by: STUDENT IN AN ORGANIZED HEALTH CARE EDUCATION/TRAINING PROGRAM

## 2024-08-23 PROCEDURE — 27200710 HC EPIDURAL INFUSION PUMP SET: Performed by: STUDENT IN AN ORGANIZED HEALTH CARE EDUCATION/TRAINING PROGRAM

## 2024-08-23 PROCEDURE — 85025 COMPLETE CBC W/AUTO DIFF WBC: CPT | Performed by: OBSTETRICS & GYNECOLOGY

## 2024-08-23 RX ORDER — OXYTOCIN-SODIUM CHLORIDE 0.9% IV SOLN 30 UNIT/500ML 30-0.9/5 UT/ML-%
95 SOLUTION INTRAVENOUS ONCE AS NEEDED
Status: DISCONTINUED | OUTPATIENT
Start: 2024-08-23 | End: 2024-08-24

## 2024-08-23 RX ORDER — DIPHENOXYLATE HYDROCHLORIDE AND ATROPINE SULFATE 2.5; .025 MG/1; MG/1
2 TABLET ORAL EVERY 6 HOURS PRN
Status: DISCONTINUED | OUTPATIENT
Start: 2024-08-23 | End: 2024-08-24

## 2024-08-23 RX ORDER — CALCIUM CARBONATE 200(500)MG
500 TABLET,CHEWABLE ORAL 3 TIMES DAILY PRN
Status: DISCONTINUED | OUTPATIENT
Start: 2024-08-23 | End: 2024-08-24

## 2024-08-23 RX ORDER — ONDANSETRON HYDROCHLORIDE 2 MG/ML
4 INJECTION, SOLUTION INTRAVENOUS EVERY 6 HOURS PRN
Status: DISCONTINUED | OUTPATIENT
Start: 2024-08-23 | End: 2024-08-24

## 2024-08-23 RX ORDER — ONDANSETRON HYDROCHLORIDE 2 MG/ML
4 INJECTION, SOLUTION INTRAVENOUS ONCE
Status: COMPLETED | OUTPATIENT
Start: 2024-08-23 | End: 2024-08-23

## 2024-08-23 RX ORDER — SODIUM CHLORIDE 9 MG/ML
INJECTION, SOLUTION INTRAVENOUS
Status: DISCONTINUED | OUTPATIENT
Start: 2024-08-23 | End: 2024-08-24

## 2024-08-23 RX ORDER — OXYTOCIN-SODIUM CHLORIDE 0.9% IV SOLN 30 UNIT/500ML 30-0.9/5 UT/ML-%
0-32 SOLUTION INTRAVENOUS CONTINUOUS
Status: DISCONTINUED | OUTPATIENT
Start: 2024-08-23 | End: 2024-08-24

## 2024-08-23 RX ORDER — FENTANYL/BUPIVACAINE/NS/PF 2MCG/ML-.1
PLASTIC BAG, INJECTION (ML) INJECTION CONTINUOUS
Status: DISCONTINUED | OUTPATIENT
Start: 2024-08-23 | End: 2024-08-24

## 2024-08-23 RX ORDER — SIMETHICONE 80 MG
1 TABLET,CHEWABLE ORAL 4 TIMES DAILY PRN
Status: DISCONTINUED | OUTPATIENT
Start: 2024-08-23 | End: 2024-08-24

## 2024-08-23 RX ORDER — TRANEXAMIC ACID 10 MG/ML
1000 INJECTION, SOLUTION INTRAVENOUS EVERY 30 MIN PRN
Status: DISCONTINUED | OUTPATIENT
Start: 2024-08-23 | End: 2024-08-24

## 2024-08-23 RX ORDER — CARBOPROST TROMETHAMINE 250 UG/ML
250 INJECTION, SOLUTION INTRAMUSCULAR
Status: DISCONTINUED | OUTPATIENT
Start: 2024-08-23 | End: 2024-08-24

## 2024-08-23 RX ORDER — MUPIROCIN 20 MG/G
OINTMENT TOPICAL
Status: DISCONTINUED | OUTPATIENT
Start: 2024-08-23 | End: 2024-08-23 | Stop reason: HOSPADM

## 2024-08-23 RX ORDER — MISOPROSTOL 200 UG/1
800 TABLET ORAL ONCE AS NEEDED
Status: DISCONTINUED | OUTPATIENT
Start: 2024-08-23 | End: 2024-08-24

## 2024-08-23 RX ORDER — ONDANSETRON HYDROCHLORIDE 2 MG/ML
4 INJECTION, SOLUTION INTRAVENOUS ONCE
Status: DISCONTINUED | OUTPATIENT
Start: 2024-08-23 | End: 2024-08-23

## 2024-08-23 RX ORDER — ROPIVACAINE HYDROCHLORIDE 2 MG/ML
20 INJECTION, SOLUTION EPIDURAL; INFILTRATION ONCE AS NEEDED
Status: DISCONTINUED | OUTPATIENT
Start: 2024-08-23 | End: 2024-08-24

## 2024-08-23 RX ORDER — OXYTOCIN-SODIUM CHLORIDE 0.9% IV SOLN 30 UNIT/500ML 30-0.9/5 UT/ML-%
10 SOLUTION INTRAVENOUS ONCE AS NEEDED
Status: DISCONTINUED | OUTPATIENT
Start: 2024-08-23 | End: 2024-08-24

## 2024-08-23 RX ORDER — FENTANYL/BUPIVACAINE/NS/PF 2MCG/ML-.1
PLASTIC BAG, INJECTION (ML) INJECTION
Status: DISCONTINUED | OUTPATIENT
Start: 2024-08-23 | End: 2024-08-24

## 2024-08-23 RX ORDER — FENTANYL/BUPIVACAINE/NS/PF 2MCG/ML-.1
14 PLASTIC BAG, INJECTION (ML) INJECTION CONTINUOUS
Status: DISCONTINUED | OUTPATIENT
Start: 2024-08-23 | End: 2024-08-24

## 2024-08-23 RX ORDER — ONDANSETRON HYDROCHLORIDE 4 MG/5ML
4 SOLUTION ORAL ONCE AS NEEDED
Status: DISCONTINUED | OUTPATIENT
Start: 2024-08-23 | End: 2024-08-24

## 2024-08-23 RX ORDER — SODIUM CHLORIDE, SODIUM LACTATE, POTASSIUM CHLORIDE, CALCIUM CHLORIDE 600; 310; 30; 20 MG/100ML; MG/100ML; MG/100ML; MG/100ML
INJECTION, SOLUTION INTRAVENOUS CONTINUOUS
Status: DISCONTINUED | OUTPATIENT
Start: 2024-08-23 | End: 2024-08-24

## 2024-08-23 RX ORDER — ONDANSETRON 4 MG/1
8 TABLET, ORALLY DISINTEGRATING ORAL EVERY 8 HOURS PRN
Status: DISCONTINUED | OUTPATIENT
Start: 2024-08-23 | End: 2024-08-24

## 2024-08-23 RX ORDER — METHYLERGONOVINE MALEATE 0.2 MG/ML
200 INJECTION INTRAVENOUS ONCE AS NEEDED
Status: DISCONTINUED | OUTPATIENT
Start: 2024-08-23 | End: 2024-08-24

## 2024-08-23 RX ORDER — OXYTOCIN 10 [USP'U]/ML
10 INJECTION, SOLUTION INTRAMUSCULAR; INTRAVENOUS ONCE AS NEEDED
Status: DISCONTINUED | OUTPATIENT
Start: 2024-08-23 | End: 2024-08-24

## 2024-08-23 RX ORDER — NALOXONE HCL 0.4 MG/ML
0.4 VIAL (ML) INJECTION SEE ADMIN INSTRUCTIONS
Status: DISCONTINUED | OUTPATIENT
Start: 2024-08-23 | End: 2024-08-24

## 2024-08-23 RX ORDER — DIPHENHYDRAMINE HYDROCHLORIDE 50 MG/ML
12.5 INJECTION INTRAMUSCULAR; INTRAVENOUS EVERY 4 HOURS PRN
Status: DISCONTINUED | OUTPATIENT
Start: 2024-08-23 | End: 2024-08-24

## 2024-08-23 RX ORDER — LIDOCAINE HYDROCHLORIDE 20 MG/ML
10 INJECTION, SOLUTION EPIDURAL; INFILTRATION; INTRACAUDAL; PERINEURAL ONCE AS NEEDED
Status: DISCONTINUED | OUTPATIENT
Start: 2024-08-23 | End: 2024-08-24

## 2024-08-23 RX ORDER — EPHEDRINE SULFATE 50 MG/ML
10 INJECTION, SOLUTION INTRAVENOUS ONCE AS NEEDED
Status: DISCONTINUED | OUTPATIENT
Start: 2024-08-23 | End: 2024-08-24

## 2024-08-23 RX ORDER — LIDOCAINE HCL/EPINEPHRINE/PF 2%-1:200K
10 VIAL (ML) INJECTION ONCE AS NEEDED
Status: DISCONTINUED | OUTPATIENT
Start: 2024-08-23 | End: 2024-08-24

## 2024-08-23 RX ORDER — LIDOCAINE HYDROCHLORIDE 10 MG/ML
10 INJECTION, SOLUTION INFILTRATION; PERINEURAL ONCE AS NEEDED
Status: DISCONTINUED | OUTPATIENT
Start: 2024-08-23 | End: 2024-08-24

## 2024-08-23 RX ADMIN — SODIUM CHLORIDE, POTASSIUM CHLORIDE, SODIUM LACTATE AND CALCIUM CHLORIDE 1000 ML: 600; 310; 30; 20 INJECTION, SOLUTION INTRAVENOUS at 02:08

## 2024-08-23 RX ADMIN — SODIUM CHLORIDE, POTASSIUM CHLORIDE, SODIUM LACTATE AND CALCIUM CHLORIDE: 600; 310; 30; 20 INJECTION, SOLUTION INTRAVENOUS at 10:08

## 2024-08-23 RX ADMIN — ONDANSETRON 4 MG: 2 INJECTION INTRAMUSCULAR; INTRAVENOUS at 02:08

## 2024-08-23 RX ADMIN — ONDANSETRON 4 MG: 2 INJECTION INTRAMUSCULAR; INTRAVENOUS at 01:08

## 2024-08-23 RX ADMIN — Medication 2 MILLI-UNITS/MIN: at 03:08

## 2024-08-23 NOTE — L&D DELIVERY NOTE
[facility]  Vaginal Delivery   Operative Note    SUMMARY     Patient got to complete dilation with a good working epidural, and only pushed for a few contractions.  No episiotomy was performed.  The head delivered in the occipital anterior presentation.  After the head delivered, the right shoulder was in the anterior position, and that cleared easily followed by the rest of the infant, which is a viable male infant.  Nares and mouth were then suctioned, then the baby was handed off to the mom.  Once the cord stopped pulsating, it was clamped by me and cut by the baby's daddy.  Cord blood was obtained for the nursery.  The placenta delivered in Schultze presentation and is complete and intact.  The uterus is firm.  The perineum is intact, but there is a 3 cm left labia minora tear that was repaired with a running interlocking of 3-0 chromic.  Blood loss for the whole delivery is about 100 cc.    Indications: Encounter for elective induction of labor  Pregnancy complicated by:   Patient Active Problem List   Diagnosis    History of opioid abuse    Posture abnormality    Weakness of both hips    Encounter for elective induction of labor    Mild episode of recurrent major depressive disorder    JUAN R (generalized anxiety disorder)    Borderline personality disorder    PTSD (post-traumatic stress disorder)    Wheezing    32 weeks gestation of pregnancy    Decreased fetal movements in third trimester    38 weeks gestation of pregnancy    Nausea and vomiting in pregnancy     Admitting GA: 39w6d    Delivery Information for Aamir Hitchcock    Birth information:  YOB: 2024   Time of birth: 5:55 PM   Sex: male   Head Delivery Date/Time:     Delivery type:    Gestational Age: 39w6d        Delivery Providers    Delivering clinician:            Measurements    Weight:   Length:          Apgars    Living status:   Apgar Component Scores:  1 min.:  5 min.:  10 min.:  15 min.:  20 min.:    Skin color:          Heart rate:         Reflex irritability:         Muscle tone:         Respiratory effort:         Total:                                  Interventions/Resuscitation           Cord    No data filed       Placenta    Placenta delivery date/time:   Placenta removal:            Labor Events:       labor:       Labor Onset Date/Time:         Dilation Complete Date/Time:         Start Pushing Date/Time:         Start Pushing Date/Time:       Rupture Date/Time: 24  1330         Rupture type: SRM (Spontaneous Rupture)         Fluid Amount:       Fluid Color: Clear               steroids:       Antibiotics given for GBS:       Induction:       Indications for induction:        Augmentation:       Indications for augmentation:       Labor complications:       Additional complications:          Cervical ripening:                     Delivery:      Episiotomy:       Indication for Episiotomy:       Perineal Lacerations:   Repaired:      Periurethral Laceration:   Repaired:     Labial Laceration:   Repaired:     Sulcus Laceration:   Repaired:     Vaginal Laceration:   Repaired:     Cervical Laceration:   Repaired:     Repair suture:       Repair # of packets:       Last Value - EBL - Nursing (mL):       Sum - EBL - Nursing (mL): 0     Last Value - EBL - Anesthesia (mL):      Calculated QBL (mL):       Running total QBL (mL):       Vaginal Sweep Performed:       Surgicount Correct:         Other providers:            Details (if applicable):  Trial of Labor      Categorization:      Priority:     Indications for :     Incision Type:       Additional  information:  Forceps:    Vacuum:    Breech:    Observed anomalies    Other (Comments):

## 2024-08-23 NOTE — NURSING
OBGYN LABS ENTERED INTO RESULTS CONSOLE      1st Trimester Labs Entered Into Results Console     [x] AOBRH   [x] Rubella Immune   [x] RPR   [x] HBsAg   [x] HIV   [] Chlamydia   [] Gonorrhea   [] Cell-Free DNA   [] Hep-C   [] Varicella    2nd Trimester Labs Entered Into Results Console     []OB Glucose Screen      3rd Trimester Labs Entered Into Results Console      [x] GBS   [] RPR    Drug Screen Entered Into Results Console     [] Benzodiazes   [] Methadone   [] Cocaine (Metab)   [] Opiate   [] Amphetamine   [] Marijuana   [] Creatinine   [] Amphetamines-Beaker   [] Barbituates-Beaker   [] Benzodiazepine-Beaker   [] Cannabinoids-Beaker   [] Cocaine-Beaker   [] Fentanyl-Beaker   [] MDMA-Beaker   [] Opiates-Beaker   [] Phencyclidine-Beaker        Results Entered by Mary Huff RN    Results Verified for Manual Entry Accuracy by Kt FLORES

## 2024-08-23 NOTE — NURSING
Nurses Note -- 4 Eyes      8/23/2024   4:19 AM      Skin assessed during: Admit      [x] No Altered Skin Integrity Present    []Prevention Measures Documented      [] Yes- Altered Skin Integrity Present or Discovered   [] LDA Added if Not in Epic (Describe Wound)   [] New Altered Skin Integrity was Present on Admit and Documented in LDA   [] Wound Image Taken    Wound Care Consulted? No    Attending Nurse:  Refugio Cifuentes RN/Staff Member:  Kt FLORES
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
## 2024-08-23 NOTE — HOSPITAL COURSE
27yo  39.6 wks, LGA for induction.  GBBS negative. Pitocin, epidural, , viable male infant, small left labia minora tear and repair.

## 2024-08-23 NOTE — ANESTHESIA PREPROCEDURE EVALUATION
08/23/2024  Bhargavi Hitchcock is a 28 y.o., female.      Pre-op Assessment    I have reviewed the Patient Summary Reports.     I have reviewed the Nursing Notes. I have reviewed the NPO Status.   I have reviewed the Medications.     Review of Systems  Anesthesia Hx:  No problems with previous Anesthesia             Denies Family Hx of Anesthesia complications.    Denies Personal Hx of Anesthesia complications.                    Social:  Former Smoker       Hematology/Oncology:  Hematology Normal   Oncology Normal                                   EENT/Dental:  EENT/Dental Normal           Cardiovascular:  Cardiovascular Normal                  ECG has been reviewed.                          Pulmonary:    Asthma                    Renal/:  Renal/ Normal                 Hepatic/GI:  Hepatic/GI Normal                 Musculoskeletal:  Musculoskeletal Normal                Neurological:  Neurology Normal                                      Endocrine:  Endocrine Normal            Psych:  Psychiatric History anxiety depression                Physical Exam  General: Well nourished, Cooperative, Alert and Oriented    Airway:  Mallampati: II   Mouth Opening: Normal  TM Distance: Normal  Tongue: Normal  Neck ROM: Normal ROM    Dental:  Intact    Chest/Lungs:  Clear to auscultation    Heart:  Rate: Normal  Rhythm: Regular Rhythm  Sounds: Normal        Anesthesia Plan  Type of Anesthesia, risks & benefits discussed:    Anesthesia Type: Epidural  Intra-op Monitoring Plan: Standard ASA Monitors  Informed Consent: Informed consent signed with the Patient and all parties understand the risks and agree with anesthesia plan.  All questions answered.   ASA Score: 2 Emergent  Anesthesia Plan Notes: Emergent epidural requested after attempts at pushing were unsuccessful.      Ready For Surgery From Anesthesia Perspective.      .

## 2024-08-24 VITALS
SYSTOLIC BLOOD PRESSURE: 100 MMHG | WEIGHT: 209 LBS | HEIGHT: 61 IN | HEART RATE: 68 BPM | BODY MASS INDEX: 39.46 KG/M2 | DIASTOLIC BLOOD PRESSURE: 63 MMHG | TEMPERATURE: 99 F | RESPIRATION RATE: 18 BRPM | OXYGEN SATURATION: 98 %

## 2024-08-24 LAB
BASOPHILS # BLD AUTO: 0.04 K/UL (ref 0–0.2)
BASOPHILS NFR BLD: 0.2 % (ref 0–1.9)
DIFFERENTIAL METHOD BLD: ABNORMAL
EOSINOPHIL # BLD AUTO: 0 K/UL (ref 0–0.5)
EOSINOPHIL NFR BLD: 0.2 % (ref 0–8)
ERYTHROCYTE [DISTWIDTH] IN BLOOD BY AUTOMATED COUNT: 14.6 % (ref 11.5–14.5)
HCT VFR BLD AUTO: 27.5 % (ref 37–48.5)
HGB BLD-MCNC: 8.4 G/DL (ref 12–16)
IMM GRANULOCYTES # BLD AUTO: 0.08 K/UL (ref 0–0.04)
IMM GRANULOCYTES NFR BLD AUTO: 0.5 % (ref 0–0.5)
LYMPHOCYTES # BLD AUTO: 2.4 K/UL (ref 1–4.8)
LYMPHOCYTES NFR BLD: 14.8 % (ref 18–48)
MCH RBC QN AUTO: 23.8 PG (ref 27–31)
MCHC RBC AUTO-ENTMCNC: 30.5 G/DL (ref 32–36)
MCV RBC AUTO: 78 FL (ref 82–98)
MONOCYTES # BLD AUTO: 1.1 K/UL (ref 0.3–1)
MONOCYTES NFR BLD: 6.7 % (ref 4–15)
NEUTROPHILS # BLD AUTO: 12.5 K/UL (ref 1.8–7.7)
NEUTROPHILS NFR BLD: 77.6 % (ref 38–73)
NRBC BLD-RTO: 0 /100 WBC
PLATELET # BLD AUTO: 252 K/UL (ref 150–450)
PMV BLD AUTO: 11.9 FL (ref 9.2–12.9)
RBC # BLD AUTO: 3.53 M/UL (ref 4–5.4)
WBC # BLD AUTO: 16.18 K/UL (ref 3.9–12.7)

## 2024-08-24 PROCEDURE — 85025 COMPLETE CBC W/AUTO DIFF WBC: CPT | Performed by: OBSTETRICS & GYNECOLOGY

## 2024-08-24 PROCEDURE — 25000003 PHARM REV CODE 250: Performed by: OBSTETRICS & GYNECOLOGY

## 2024-08-24 PROCEDURE — 25000003 PHARM REV CODE 250: Performed by: GENERAL PRACTICE

## 2024-08-24 PROCEDURE — 36415 COLL VENOUS BLD VENIPUNCTURE: CPT | Performed by: OBSTETRICS & GYNECOLOGY

## 2024-08-24 PROCEDURE — 99024 POSTOP FOLLOW-UP VISIT: CPT | Mod: ,,, | Performed by: GENERAL PRACTICE

## 2024-08-24 RX ORDER — OXYTOCIN-SODIUM CHLORIDE 0.9% IV SOLN 30 UNIT/500ML 30-0.9/5 UT/ML-%
10 SOLUTION INTRAVENOUS ONCE AS NEEDED
Status: DISCONTINUED | OUTPATIENT
Start: 2024-08-24 | End: 2024-08-24 | Stop reason: HOSPADM

## 2024-08-24 RX ORDER — OXYTOCIN-SODIUM CHLORIDE 0.9% IV SOLN 30 UNIT/500ML 30-0.9/5 UT/ML-%
95 SOLUTION INTRAVENOUS ONCE
Status: DISCONTINUED | OUTPATIENT
Start: 2024-08-24 | End: 2024-08-24 | Stop reason: HOSPADM

## 2024-08-24 RX ORDER — IBUPROFEN 800 MG/1
800 TABLET ORAL EVERY 8 HOURS PRN
Qty: 30 TABLET | Refills: 0 | Status: SHIPPED | OUTPATIENT
Start: 2024-08-24

## 2024-08-24 RX ORDER — DIPHENHYDRAMINE HCL 25 MG
25 CAPSULE ORAL EVERY 4 HOURS PRN
Status: DISCONTINUED | OUTPATIENT
Start: 2024-08-24 | End: 2024-08-24 | Stop reason: HOSPADM

## 2024-08-24 RX ORDER — METHYLERGONOVINE MALEATE 0.2 MG/ML
200 INJECTION INTRAVENOUS ONCE AS NEEDED
Status: DISCONTINUED | OUTPATIENT
Start: 2024-08-24 | End: 2024-08-24 | Stop reason: HOSPADM

## 2024-08-24 RX ORDER — DIPHENOXYLATE HYDROCHLORIDE AND ATROPINE SULFATE 2.5; .025 MG/1; MG/1
2 TABLET ORAL EVERY 6 HOURS PRN
Status: DISCONTINUED | OUTPATIENT
Start: 2024-08-24 | End: 2024-08-24 | Stop reason: HOSPADM

## 2024-08-24 RX ORDER — CARBOPROST TROMETHAMINE 250 UG/ML
250 INJECTION, SOLUTION INTRAMUSCULAR
Status: DISCONTINUED | OUTPATIENT
Start: 2024-08-24 | End: 2024-08-24 | Stop reason: HOSPADM

## 2024-08-24 RX ORDER — LANOLIN ALCOHOL/MO/W.PET/CERES
1 CREAM (GRAM) TOPICAL DAILY
Status: DISCONTINUED | OUTPATIENT
Start: 2024-08-24 | End: 2024-08-24 | Stop reason: HOSPADM

## 2024-08-24 RX ORDER — SIMETHICONE 80 MG
1 TABLET,CHEWABLE ORAL EVERY 6 HOURS PRN
Status: DISCONTINUED | OUTPATIENT
Start: 2024-08-24 | End: 2024-08-24 | Stop reason: HOSPADM

## 2024-08-24 RX ORDER — HYDROCORTISONE 25 MG/G
CREAM TOPICAL 3 TIMES DAILY PRN
Status: DISCONTINUED | OUTPATIENT
Start: 2024-08-24 | End: 2024-08-24 | Stop reason: HOSPADM

## 2024-08-24 RX ORDER — MISOPROSTOL 200 UG/1
800 TABLET ORAL ONCE AS NEEDED
Status: DISCONTINUED | OUTPATIENT
Start: 2024-08-24 | End: 2024-08-24 | Stop reason: HOSPADM

## 2024-08-24 RX ORDER — FERROUS GLUCONATE 324(38)MG
324 TABLET ORAL EVERY OTHER DAY
Qty: 21 TABLET | Refills: 0 | Status: SHIPPED | OUTPATIENT
Start: 2024-08-24 | End: 2024-10-05

## 2024-08-24 RX ORDER — OXYCODONE AND ACETAMINOPHEN 10; 325 MG/1; MG/1
1 TABLET ORAL EVERY 4 HOURS PRN
Status: DISCONTINUED | OUTPATIENT
Start: 2024-08-24 | End: 2024-08-24 | Stop reason: HOSPADM

## 2024-08-24 RX ORDER — DOCUSATE SODIUM 100 MG/1
200 CAPSULE, LIQUID FILLED ORAL 2 TIMES DAILY PRN
Status: DISCONTINUED | OUTPATIENT
Start: 2024-08-24 | End: 2024-08-24 | Stop reason: HOSPADM

## 2024-08-24 RX ORDER — OXYCODONE AND ACETAMINOPHEN 5; 325 MG/1; MG/1
1 TABLET ORAL EVERY 4 HOURS PRN
Status: DISCONTINUED | OUTPATIENT
Start: 2024-08-24 | End: 2024-08-24 | Stop reason: HOSPADM

## 2024-08-24 RX ORDER — OXYTOCIN 10 [USP'U]/ML
10 INJECTION, SOLUTION INTRAMUSCULAR; INTRAVENOUS ONCE AS NEEDED
Status: DISCONTINUED | OUTPATIENT
Start: 2024-08-24 | End: 2024-08-24 | Stop reason: HOSPADM

## 2024-08-24 RX ORDER — AMOXICILLIN 250 MG
1 CAPSULE ORAL DAILY PRN
Qty: 30 TABLET | Refills: 0 | Status: SHIPPED | OUTPATIENT
Start: 2024-08-24

## 2024-08-24 RX ORDER — PRENATAL WITH FERROUS FUM AND FOLIC ACID 3080; 920; 120; 400; 22; 1.84; 3; 20; 10; 1; 12; 200; 27; 25; 2 [IU]/1; [IU]/1; MG/1; [IU]/1; MG/1; MG/1; MG/1; MG/1; MG/1; MG/1; UG/1; MG/1; MG/1; MG/1; MG/1
1 TABLET ORAL DAILY
Status: DISCONTINUED | OUTPATIENT
Start: 2024-08-24 | End: 2024-08-24 | Stop reason: HOSPADM

## 2024-08-24 RX ORDER — IBUPROFEN 400 MG/1
800 TABLET ORAL EVERY 6 HOURS PRN
Status: DISCONTINUED | OUTPATIENT
Start: 2024-08-24 | End: 2024-08-24 | Stop reason: HOSPADM

## 2024-08-24 RX ORDER — TRANEXAMIC ACID 10 MG/ML
1000 INJECTION, SOLUTION INTRAVENOUS EVERY 30 MIN PRN
Status: DISCONTINUED | OUTPATIENT
Start: 2024-08-24 | End: 2024-08-24 | Stop reason: HOSPADM

## 2024-08-24 RX ORDER — AMOXICILLIN 250 MG
1 CAPSULE ORAL NIGHTLY
Status: DISCONTINUED | OUTPATIENT
Start: 2024-08-24 | End: 2024-08-24 | Stop reason: HOSPADM

## 2024-08-24 RX ORDER — ONDANSETRON 4 MG/1
8 TABLET, ORALLY DISINTEGRATING ORAL EVERY 8 HOURS PRN
Status: DISCONTINUED | OUTPATIENT
Start: 2024-08-24 | End: 2024-08-24 | Stop reason: HOSPADM

## 2024-08-24 RX ORDER — OXYTOCIN-SODIUM CHLORIDE 0.9% IV SOLN 30 UNIT/500ML 30-0.9/5 UT/ML-%
95 SOLUTION INTRAVENOUS ONCE AS NEEDED
Status: DISCONTINUED | OUTPATIENT
Start: 2024-08-24 | End: 2024-08-24 | Stop reason: HOSPADM

## 2024-08-24 RX ADMIN — PRENATAL VIT W/ FE FUMARATE-FA TAB 27-0.8 MG 1 TABLET: 27-0.8 TAB at 08:08

## 2024-08-24 RX ADMIN — IBUPROFEN 800 MG: 400 TABLET, FILM COATED ORAL at 03:08

## 2024-08-24 RX ADMIN — FERROUS SULFATE TAB 325 MG (65 MG ELEMENTAL FE) 1 EACH: 325 (65 FE) TAB at 01:08

## 2024-08-24 RX ADMIN — DOCUSATE SODIUM 200 MG: 100 CAPSULE, LIQUID FILLED ORAL at 08:08

## 2024-08-24 RX ADMIN — Medication: at 03:08

## 2024-08-24 RX ADMIN — IBUPROFEN 800 MG: 400 TABLET, FILM COATED ORAL at 08:08

## 2024-08-24 RX ADMIN — BENZOCAINE AND LEVOMENTHOL: 200; 5 SPRAY TOPICAL at 03:08

## 2024-08-24 NOTE — ANESTHESIA POSTPROCEDURE EVALUATION
Anesthesia Post Evaluation    Patient: Bhargavi Hitchcock    Procedure(s) Performed: * No procedures listed *    Final Anesthesia Type: epidural      Patient location during evaluation: floor  Patient participation: Yes- Able to Participate  Level of consciousness: awake and alert and oriented  Post-procedure vital signs: reviewed and stable  Pain management: adequate  Airway patency: patent    PONV status at discharge: No PONV  Anesthetic complications: no      Cardiovascular status: blood pressure returned to baseline and hemodynamically stable  Respiratory status: unassisted, spontaneous ventilation and room air  Hydration status: euvolemic  Follow-up not needed.          Postpartum day #1 status post vaginal delivery with epidural analgesia. This morning patient is resting comfortably in bed, she is alert and oriented and without complaints. Patient denies headache, back pain, leg pain weakness or numbness.  Patient does report minimal soreness needle placement site. Patient was advised that it is normal to have discomfort at the needle placement site and that this should resolve over a period of approximately 2 weeks.  The patient was further advised that should her pain persist or worsen she could always contact [facility] to reach the anesthesiologist on-call, or let her obstetrician know for any further follow-up if needed.  Epidural site examined and no bleeding bruising or discharge noted. No apparent anesthetic related complications. Please reconsult if needed.      Vitals Value Taken Time   BP 98/63 08/24/24 0706   Temp 36.8 °C (98.2 °F) 08/24/24 0841   Pulse 68 08/24/24 0706   Resp 18 08/24/24 0706   SpO2 97 % 08/24/24 0706         No case tracking events are documented in the log.      Pain/Kelsea Score: Pain Rating Prior to Med Admin: 5 (8/24/2024  8:41 AM)  Pain Rating Post Med Admin: 0 (8/24/2024  4:17 AM)

## 2024-08-24 NOTE — LACTATION NOTE
08/24/24 1500   Maternal Assessment   Breast Shape round   Breast Density soft   Areola elastic   Nipples everted   Maternal Infant Feeding   Maternal Emotional State relaxed   Infant Positioning cross-cradle   Signs of Milk Transfer audible swallow;infant jaw motion present   Pain with Feeding no   Comfort Measures Before/During Feeding infant position adjusted;latch adjusted;maternal position adjusted   Latch Assistance yes     Assisted with latching infant to right breast in cross cradle position. Deep latch achieved with rhythmic, nutritive sucks observed. Mother denies pain with feeding. Instructed to call with further breastfeeding assistance. Verbalizes understanding with good recall.

## 2024-08-24 NOTE — PLAN OF CARE
08/24/24 1159   Final Note   Assessment Type Final Discharge Note   Anticipated Discharge Disposition Home   What phone number can be called within the next 1-3 days to see how you are doing after discharge? [phone]   Post-Acute Status   Discharge Delays None known at this time     Discharge orders and chart reviewed with no further post-acute discharge needs identified at this time.  At this time, patient is cleared for discharge from Case Management standpoint.

## 2024-08-24 NOTE — NURSING TRANSFER
Nursing Transfer Note      8/23/2024   11:13 PM    Nurse giving handoff: Refugio RN  Nurse receiving handoff:Mira FLORES    Reason patient is being transferred: PP care    Transfer To: PP    Transfer via wheelchair    Transfer with personal belongings    Transported by Refugio RN    Transfer Vital Signs:  See flowsheets    Order for Tele Monitor? No    Additional Lines: n/a    4eyes on Skin: no    Medicines sent: n/a    Any special needs or follow-up needed: n/a    Patient belongings transferred with patient: yes    Chart send with patient: yes    Notified: spouse    Patient reassessed at: 7059 8/23/24     Upon arrival to floor: patient oriented to room, call bell in reach, and bed in lowest position

## 2024-08-24 NOTE — DISCHARGE SUMMARY
Obstetric Discharge Summary  2024  Bhargavi Hitchcock  MRN: [id number]    Admission Date: 2024   Discharge Date: 2024    Primary OB Doctor: Dr. Castellanos    Gestational Age @ Delivery:39w6d  G'sP's:     Antepartum complications:  Maternal obesity  Fibroids  Asthma  Anxiety   Transfer from [place]    Delivery Type: spontaneous vaginal delivery  Date of Delivery: 24 / Time of Delivery: 1755hrs   Delivered By: Dr. Castellanos  Anesthesia: epidural  Intrapartum complications: None  Blood Loss: 200cc  Laceration: labial  Placenta: spontaneous    Baby: Liveborn male, Apgars 8/8, weight 3775g  Feeding method: breast    Rh Immune globulin given: not applicable  Rubella vaccine given: not applicable    Hospital Course: Bhargavi Hitchcock is a 28 y.o.   27yo  39.6 wks, LGA for induction.  GBS negative. Pitocin, epidural, , viable male infant, small left labia minora tear and repair. Her postpartum course was uncomplicated, and she meets discharge criteria on PPD 1.    ROUNDING NOTE, DAY OF DISCHARGE  Subjective:  Ambulating without difficulty: yes, Tolerating a normal diet: yes, Voiding normally: yes, GI: passing flatus and no BM yet, Pain management: managing with ibuprofen, Lochia: comparable to a normal period.  Denies chest pain, SOB, or leg pain.    Objective  Vitals wnl    GEN = nad, alert/oriented, cheerful  BREASTS = deferred  PULM = normal respiratory efforts  ABD = soft, nontender, nondistended, fundus firm at U-2   = deferred  EXT = no CT    Recent Labs   Lab 24  0347 24  0357   WBC 10.80 16.18*   HGB 9.0* 8.4*   HCT 28.9* 27.5*    252   MCV 77* 78*     Assessment and Plan  28 y.o. yo , PPD 1.  Doing well and ready for discharge.  postpartum care and expectations reviewed; return precautions reviewed  Will start iron for acute blood loss anemia    MD GERMANIA    Plan:     DISCHARGE MEDICATIONS:    -- Prenatal Vitamin 1 tab once a day (take for six weeks postpartum or  "as long as you are breastfeeding)    -- Ibuprofen 800 mg by mouth three times a day with meals as needed for mild to moderate pain    -- Ferrous Gluconate 1 tab by mouth every other day with meals (for anemia; may cause constipation; take for six weeks postpartum; your body absorbs it best if taken every other day)    -- Senna-docusate 8.6-50 mg by mouth once a day as needed for constipation    -- Continue your other outpatient medications (if any) as previously prescribed      PATIENT INSTRUCTIONS:    -call or return for pain not controlled by your medications, heavy bleeding, problems with your stitches, fever, signs of postpartum depression or "baby blues"    -okay to shower but no bathing, swimming, or soaking for 6 weeks    -pelvic rest (no sex, no tampons, nothing in the vagina) for 6 weeks    -no strenuous activity or lifting >10lbs for 6 weeks    -call Dr. Castellanos's office to schedule your postpartum appointment(s)    "

## 2024-08-24 NOTE — LACTATION NOTE
Mother reports infant has been breastfeeding well and without difficulty.   Instructed on the signs of an effective feeding.  Discussed positioning, comfortable latch, rhythmic, nutritive sucking, audible swallows, appropriate length of feeding, comfort of latch and evaluating for fullness cues.  Also discussed appropriate output for age.  Mother states understanding and verbalized appropriate recall. Encouraged mother to call me for assistance with next feeding; v/u and is in agreement.

## 2024-08-24 NOTE — PLAN OF CARE
Patient appropriate for discharge  Problem: Adult Inpatient Plan of Care  Goal: Plan of Care Review  Outcome: Met     Problem: Adult Inpatient Plan of Care  Goal: Patient-Specific Goal (Individualized)  Outcome: Met     Problem: Adult Inpatient Plan of Care  Goal: Absence of Hospital-Acquired Illness or Injury  Outcome: Met     Problem: Adult Inpatient Plan of Care  Goal: Optimal Comfort and Wellbeing  Outcome: Met     Problem: Infection  Goal: Absence of Infection Signs and Symptoms  Outcome: Met     Problem:  Fall Injury Risk  Goal: Absence of Fall, Infant Drop and Related Injury  Outcome: Met

## 2024-08-24 NOTE — PLAN OF CARE
[facility]  Discharge Assessment    Primary Care Provider: Lorri Raymond MD     OB Screen completed and no needs identified at this time.  White board in room updated with contact information, and mother was encouraged to contact office if further needs arise.    OB Screen (most recent)       OB Screen - 24 0749          OB SCREEN    Assessment Type Discharge Planning Assessment     Source of Information health record     Received Prenatal Care Yes     Any indications/suspicions for None     Is this a teen pregnancy No     Is the baby in NICU No     Indication for adoption/Safe Haven No     Indication for DME/post-acute needs No     HIV (+) No     Any congenital  disorders No     Fetal demise/ death No

## 2024-08-24 NOTE — DISCHARGE INSTRUCTIONS

## 2024-08-26 ENCOUNTER — PATIENT MESSAGE (OUTPATIENT)
Dept: LACTATION | Facility: HOSPITAL | Age: 29
End: 2024-08-26

## 2024-08-26 ENCOUNTER — PATIENT OUTREACH (OUTPATIENT)
Dept: ADMINISTRATIVE | Facility: CLINIC | Age: 29
End: 2024-08-26
Payer: COMMERCIAL

## 2024-09-09 DIAGNOSIS — F33.41 RECURRENT MAJOR DEPRESSIVE DISORDER, IN PARTIAL REMISSION: ICD-10-CM

## 2024-09-09 DIAGNOSIS — F41.1 GAD (GENERALIZED ANXIETY DISORDER): ICD-10-CM

## 2024-09-09 DIAGNOSIS — F43.10 PTSD (POST-TRAUMATIC STRESS DISORDER): ICD-10-CM

## 2024-09-09 RX ORDER — SERTRALINE HYDROCHLORIDE 50 MG/1
75 TABLET, FILM COATED ORAL DAILY
Qty: 45 TABLET | Refills: 11 | Status: SHIPPED | OUTPATIENT
Start: 2024-09-09 | End: 2024-09-13 | Stop reason: SDUPTHER

## 2024-09-13 ENCOUNTER — PATIENT MESSAGE (OUTPATIENT)
Dept: URGENT CARE | Facility: CLINIC | Age: 29
End: 2024-09-13
Payer: COMMERCIAL

## 2024-09-13 ENCOUNTER — OFFICE VISIT (OUTPATIENT)
Dept: PSYCHIATRY | Facility: CLINIC | Age: 29
End: 2024-09-13
Payer: COMMERCIAL

## 2024-09-13 DIAGNOSIS — F41.1 GAD (GENERALIZED ANXIETY DISORDER): ICD-10-CM

## 2024-09-13 DIAGNOSIS — F51.05 INSOMNIA DUE TO MENTAL DISORDER: ICD-10-CM

## 2024-09-13 DIAGNOSIS — F60.3 BORDERLINE PERSONALITY DISORDER: ICD-10-CM

## 2024-09-13 DIAGNOSIS — F43.10 PTSD (POST-TRAUMATIC STRESS DISORDER): ICD-10-CM

## 2024-09-13 DIAGNOSIS — F33.41 RECURRENT MAJOR DEPRESSIVE DISORDER, IN PARTIAL REMISSION: Primary | ICD-10-CM

## 2024-09-13 PROCEDURE — 99214 OFFICE O/P EST MOD 30 MIN: CPT | Mod: 95,,, | Performed by: INTERNAL MEDICINE

## 2024-09-13 PROCEDURE — 1111F DSCHRG MED/CURRENT MED MERGE: CPT | Mod: CPTII,95,, | Performed by: INTERNAL MEDICINE

## 2024-09-13 PROCEDURE — 1160F RVW MEDS BY RX/DR IN RCRD: CPT | Mod: CPTII,95,, | Performed by: INTERNAL MEDICINE

## 2024-09-13 PROCEDURE — 1159F MED LIST DOCD IN RCRD: CPT | Mod: CPTII,95,, | Performed by: INTERNAL MEDICINE

## 2024-09-13 RX ORDER — SERTRALINE HYDROCHLORIDE 50 MG/1
75 TABLET, FILM COATED ORAL DAILY
Qty: 45 TABLET | Refills: 11 | Status: SHIPPED | OUTPATIENT
Start: 2024-09-13

## 2024-09-13 NOTE — PROGRESS NOTES
OUTPATIENT PSYCHIATRY RETURN VISIT    ENCOUNTER DATE:  9/13/24   SITE:  [facility]  LENGTH OF SESSION:  13 minutes    The patient location is:  [place], not in a healthcare facility  Visit type:  audiovisual    Face to Face time with patient:  13 minutes  18 minutes of total time spent on the encounter, which includes face to face time and non-face to face time preparing to see the patient (eg, review of tests), Obtaining and/or reviewing separately obtained history, Documenting clinical information in the electronic or other health record, Independently interpreting results (not separately reported) and communicating results to the patient/family/caregiver, or Care coordination (not separately reported).     Each patient to whom he or she provides medical services by telemedicine is:  (1) informed of the relationship between the physician and patient and the respective role of any other health care provider with respect to management of the patient; and (2) notified that he or she may decline to receive medical services by telemedicine and may withdraw from such care at any time.    CHIEF COMPLAINT:  Mood      HISTORY OF PRESENTING ILLNESS:  Bhargavi Hitchcock is a 28 y.o. female with history of Depression, Anxiety, PTSD, and Mood disorder (R/o borderline personality disorder versus bipolar 2 disorder) who presents for follow up appointment.      Plan at last appointment on 08/19/23:  Symptoms improved on Zoloft 75mg daily.    She is not currently hearing voices but agrees to let me know if this occurs again.  If it does, could consider increasing Zoloft dose (since this has been helpful for the voices) or adding low dose antipsychotic.    Discussed with patient informed consent, risks versus benefits (especially while breastfeeding), alternative treatments, side effect profile and the inherent unpredictability of individual responses to these treatments.  The patient expresses understanding  of the above and displays the capacity to agree with this current plan.  She is looking for an individual therapist outside of [facility].    History as told by patient:  Daughter is 2.6 y/o and baby boy is 3 weeks old.  Did not sleep much last night.  Says she is doing really well.  He is a very easy baby.  Stayed on Zoloft 75mg daily and it has been extremely helpful.  Had a good pregnancy.  She and  moved to another house.  Had some depression around then when  was working a lot and she was doing everything for the move and she was pregnant.  Once baby was born and they had moved it all resolved - this was 1-2 months ago.  Feels it was hormonal.  Feels more in control of her emotions now.  Zoloft helps the mood swings.  Has not heard voices in awhile or if she does they are so subtle.  [place] some mean voices right after baby was born but they stopped.  This was day after baby was born.  They were saying very mean things about her (not baby).  She is easily able to ignore these.  No AVH, paranoia, delusions otherwise.  No SI or HI.  They are now in [employer] and  over [employer].  Still works a lot.  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  Brother is doing a lot better too - he does sometime get really depressed and is mean but they have worked through this.  They are talking more.  Was having trouble sleeping towards end of pregnancy.  She still struggles some but doesn't want to take anything for it.        Medication side effects:  Denies  Medication compliance:  Yes    PSYCHIATRIC REVIEW OF SYSTEMS:  Trouble with sleep:  Only due to baby  Appetite changes:  Not discussed today  Weight changes:  Not discussed today  Lack of energy:  Yes due to lack of sleep with   Anhedonia:  Denies  Somatic symptoms:  Denies  Libido:  Not discussed  Anxiety/panic:  Sometimes but stable  Guilty/hopeless:  Denies  Self-injurious  "behavior/risky behavior:  Denies  Any drugs:  Denies  Alcohol:  Occasional wine  Breastfeeding:  Yes    MEDICAL REVIEW OF SYSTEMS:  Complete review of systems performed covering Constitutional, Musculoskeletal, Neurologic.  All systems negative except for that covered in HPI.    PAST PSYCHIATRIC, MEDICAL, AND SOCIAL HISTORY REVIEWED  The patient's past medical, family and social history have been reviewed and updated as appropriate within the electronic medical record - see encounter notes.    MEDICATIONS:    Current Outpatient Medications:     albuterol (PROVENTIL/VENTOLIN HFA) 90 mcg/actuation inhaler, Inhale 1-2 puffs into the lungs every 6 (six) hours as needed for Wheezing., Disp: 18 g, Rfl: 3    cetirizine (ZYRTEC) 10 MG tablet, Take 10 mg by mouth daily as needed., Disp: , Rfl:     clonazePAM (KLONOPIN) 0.25 MG TbDL, , Disp: , Rfl:     cyclobenzaprine (FLEXERIL) 10 MG tablet, Take 10 mg by mouth every 8 (eight) hours as needed., Disp: , Rfl:     ferrous gluconate (FERGON) 324 MG tablet, Take 1 tablet (324 mg total) by mouth every other day., Disp: 21 tablet, Rfl: 0    ibuprofen (ADVIL,MOTRIN) 800 MG tablet, Take 1 tablet (800 mg total) by mouth every 8 (eight) hours as needed for Pain., Disp: 30 tablet, Rfl: 0    senna-docusate 8.6-50 mg (SENNA WITH DOCUSATE SODIUM) 8.6-50 mg per tablet, Take 1 tablet by mouth daily as needed for Constipation., Disp: 30 tablet, Rfl: 0    sertraline (ZOLOFT) 50 MG tablet, Take 1.5 tablets (75 mg total) by mouth once daily., Disp: 45 tablet, Rfl: 11    ALLERGIES:  Review of patient's allergies indicates:   Allergen Reactions    Ketorolac Shortness Of Breath       PSYCHIATRIC EXAM:  There were no vitals filed for this visit.  Appearance:  Well groomed, appearing healthy and of stated age  Behavior:  Cooperative, pleasant, no psychomotor agitation or retardation  Speech:  Normal rate, rhythm, prosody, and volume  Mood:  "Good"  Affect:  Congruent, bright  Thought Process:  " Linear, logical, goal directed  Thought Content:  Negative for suicidal ideation, homicidal ideation, delusions or hallucinations.  Associations:  Intact  Memory:  Grossly Intact  Level of Consciousness/Orientation:  Grossly intact  Fund of Knowledge:  Good  Attention:  Good  Language:  Fluent, able to name abstract and concrete objects  Insight:  Good  Judgment:  Intact  Psychomotor signs:  No abnormal movements of face  Gait:  Unable to assess via virtual visit      RELEVANT LABS/STUDIES:    Lab Results   Component Value Date    WBC 16.18 (H) 08/24/2024    HGB 8.4 (L) 08/24/2024    HCT 27.5 (L) 08/24/2024    MCV 78 (L) 08/24/2024     08/24/2024     BMP  Lab Results   Component Value Date     09/13/2023    K 3.8 09/13/2023     09/13/2023    CO2 21 (L) 09/13/2023    BUN 9 09/13/2023    CREATININE 1.0 09/13/2023    CALCIUM 9.1 09/13/2023    ANIONGAP 11 09/13/2023    ESTGFRAFRICA >60.0 10/08/2018    EGFRNONAA >60.0 10/08/2018     Lab Results   Component Value Date    ALT 14 09/13/2023    AST 20 09/13/2023    ALKPHOS 38 (L) 09/13/2023    BILITOT 0.4 09/13/2023     Lab Results   Component Value Date    TSH 2.144 09/13/2023     Lab Results   Component Value Date    HGBA1C 5.0 09/13/2023       IMPRESSION:    Bhargavi Hitchcock is a 28 y.o. female with history of Depression, Anxiety, PTSD, and Mood disorder (R/o borderline personality disorder versus bipolar 2 disorder) who presents for follow up appointment.      Status/Progress:  Based on the examination today, the patient's problem(s) is/are improved.  New problems have not been presented today.    Risk Parameters:  Patient reports no suicidal ideation  Patient reports no homicidal ideation  Patient reports no self-injurious behavior  Patient reports no violent behavior      DIAGNOSES:    ICD-10-CM ICD-9-CM   1. Recurrent major depressive disorder, in partial remission  F33.41 296.35   2. JUAN R (generalized anxiety disorder)  F41.1 300.02   3. PTSD  (post-traumatic stress disorder)  F43.10 309.81   4. Borderline personality disorder  F60.3 301.83         PLAN:  Symptoms have remained stable on Zoloft 75mg daily.    Zoloft has improved her voices.  She is not currently hearing voices but agrees to let me know if this occurs again.  If it does, could consider increasing Zoloft dose (since this has been helpful for the voices) or adding low dose antipsychotic.    Discussed with patient informed consent, risks versus benefits (especially while breastfeeding), alternative treatments, side effect profile and the inherent unpredictability of individual responses to these treatments.  The patient expresses understanding of the above and displays the capacity to agree with this current plan.    RETURN TO CLINIC:  Follow up in about 3 months (around 12/13/2024).

## 2024-09-30 PROBLEM — O36.8130 DECREASED FETAL MOVEMENTS IN THIRD TRIMESTER: Status: RESOLVED | Noted: 2024-06-30 | Resolved: 2024-09-30

## 2024-10-02 ENCOUNTER — PATIENT MESSAGE (OUTPATIENT)
Dept: PRIMARY CARE CLINIC | Facility: CLINIC | Age: 29
End: 2024-10-02
Payer: COMMERCIAL

## 2024-10-04 ENCOUNTER — PATIENT MESSAGE (OUTPATIENT)
Dept: PSYCHIATRY | Facility: CLINIC | Age: 29
End: 2024-10-04
Payer: COMMERCIAL

## 2024-10-04 DIAGNOSIS — F43.10 PTSD (POST-TRAUMATIC STRESS DISORDER): ICD-10-CM

## 2024-10-04 DIAGNOSIS — F33.41 RECURRENT MAJOR DEPRESSIVE DISORDER, IN PARTIAL REMISSION: ICD-10-CM

## 2024-10-04 DIAGNOSIS — F41.1 GAD (GENERALIZED ANXIETY DISORDER): ICD-10-CM

## 2024-10-04 RX ORDER — SERTRALINE HYDROCHLORIDE 50 MG/1
75 TABLET, FILM COATED ORAL DAILY
Qty: 45 TABLET | Refills: 11 | Status: SHIPPED | OUTPATIENT
Start: 2024-10-04

## 2024-10-17 ENCOUNTER — OFFICE VISIT (OUTPATIENT)
Dept: GASTROENTEROLOGY | Facility: CLINIC | Age: 29
End: 2024-10-17
Payer: COMMERCIAL

## 2024-10-17 VITALS
HEIGHT: 61 IN | WEIGHT: 185.19 LBS | SYSTOLIC BLOOD PRESSURE: 106 MMHG | HEART RATE: 70 BPM | BODY MASS INDEX: 34.96 KG/M2 | DIASTOLIC BLOOD PRESSURE: 60 MMHG

## 2024-10-17 DIAGNOSIS — R10.30 LOWER ABDOMINAL PAIN: ICD-10-CM

## 2024-10-17 DIAGNOSIS — K62.5 RECTAL BLEED: ICD-10-CM

## 2024-10-17 DIAGNOSIS — K59.04 CHRONIC IDIOPATHIC CONSTIPATION: Primary | ICD-10-CM

## 2024-10-17 DIAGNOSIS — Z00.00 ENCOUNTER FOR MEDICAL EXAMINATION TO ESTABLISH CARE: ICD-10-CM

## 2024-10-17 PROCEDURE — 99999 PR PBB SHADOW E&M-EST. PATIENT-LVL IV: CPT | Mod: PBBFAC,,,
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
## 2024-10-17 NOTE — PATIENT INSTRUCTIONS
..      ..MIRALAX PREP FOR COLONOSCOPY(OVER THE COUNTER PREP)    PROCEDURE DATE: 11/27/2024  REPORT TO [facility] ([address]) ONE HOUR BEFORE PROCEDURE.Your arrival time will be provided by the ENDO department about 1 week prior to your procedure date. If you do not hear from them, they can be reached at [phone] Mon- Friday 8a-2p.    NO BLOOD THINNERS/NO ASPIRIN (OK TO TAKE 81mg ASPIRIN) OR MEDICATIONS CONTAINING ASPIRIN, MOTRIN, IBUPROFEN, ALEVE, OR ANY ANTI-INFLAMMATORY MEDICATIONS FOR 7 DAYS PRIOR TO PROCEDURE. TYLENOL IS OK.    Avoid eating beans, peas, corn, nuts, popcorn, okra, and tomatoes for 5 days prior to your colonoscopy    **PURCHASE One 64 oz Bottle of Lemon-Lime Gatorade, one 8.3 oz bottle of MiraLAX (generic is acceptable), One box of Dulcolax Laxatives**    MIX MIRALAX PREP WITH GATORADE ON DAY BEFORE AND REFRIGERATE                          THE ENTIRE DAY BEFORE YOUR COLONOSCOPY CLEAR LIQUIDS ONLY (LISTED BELOW)                                                     NO FOOD: 11/26/2024   ___________________________________________________   Coffee (no cream), tea, carbonated beverages, gelatin-plain or fruit flavored, Gatorade, Crystal Light, Popsicles, snowballs, hard candy (avoid anything red, purple or blue). Juices - apple, white grape, or cranberry juice, lemonade, limeade, clear beef or chicken bouillon or broth. Avoid liquids not listed, Alcohol is not permitted.  Take 2 Dulcolax laxative tablets at 10a  Take 2 more Dulcolax laxative tablets at 12p  At 5p drink 32oz of MiraLAX prep mix   Follow with 5(8oz) fluids of clear liquid over next 5 hours  At 10p drink other 32oz of MiraLAX prep mix  Follow with 3(8oz) fluids of clear liquid within 3 hours     NOTHING BY MOUTH AFTER 4am THE MORNING OF YOUR COLONOSCOPY     Ok to take morning medication by 4am (except no Diabetic medications)    Since sedation is used, you need someone to drive you home,  No TAXI   Any Questions, please call  [phone]

## 2024-10-18 ENCOUNTER — PATIENT MESSAGE (OUTPATIENT)
Dept: PSYCHIATRY | Facility: CLINIC | Age: 29
End: 2024-10-18
Payer: COMMERCIAL

## 2024-10-21 ENCOUNTER — LAB VISIT (OUTPATIENT)
Dept: LAB | Facility: HOSPITAL | Age: 29
End: 2024-10-21
Payer: COMMERCIAL

## 2024-10-21 DIAGNOSIS — K59.04 CHRONIC IDIOPATHIC CONSTIPATION: ICD-10-CM

## 2024-10-21 LAB
ALBUMIN SERPL BCP-MCNC: 4 G/DL (ref 3.5–5.2)
ALP SERPL-CCNC: 60 U/L (ref 40–150)
ALT SERPL W/O P-5'-P-CCNC: 16 U/L (ref 10–44)
ANION GAP SERPL CALC-SCNC: 7 MMOL/L (ref 8–16)
AST SERPL-CCNC: 19 U/L (ref 10–40)
BILIRUB SERPL-MCNC: 0.5 MG/DL (ref 0.1–1)
BUN SERPL-MCNC: 12 MG/DL (ref 6–20)
CALCIUM SERPL-MCNC: 9.7 MG/DL (ref 8.7–10.5)
CHLORIDE SERPL-SCNC: 105 MMOL/L (ref 95–110)
CO2 SERPL-SCNC: 27 MMOL/L (ref 23–29)
CREAT SERPL-MCNC: 0.9 MG/DL (ref 0.5–1.4)
ERYTHROCYTE [DISTWIDTH] IN BLOOD BY AUTOMATED COUNT: 20.6 % (ref 11.5–14.5)
EST. GFR  (NO RACE VARIABLE): >60 ML/MIN/1.73 M^2
GLUCOSE SERPL-MCNC: 81 MG/DL (ref 70–110)
HCT VFR BLD AUTO: 40.1 % (ref 37–48.5)
HGB BLD-MCNC: 12.4 G/DL (ref 12–16)
MCH RBC QN AUTO: 25.3 PG (ref 27–31)
MCHC RBC AUTO-ENTMCNC: 30.9 G/DL (ref 32–36)
MCV RBC AUTO: 82 FL (ref 82–98)
PLATELET # BLD AUTO: 340 K/UL (ref 150–450)
PMV BLD AUTO: 10.8 FL (ref 9.2–12.9)
POTASSIUM SERPL-SCNC: 3.7 MMOL/L (ref 3.5–5.1)
PROT SERPL-MCNC: 7.5 G/DL (ref 6–8.4)
RBC # BLD AUTO: 4.91 M/UL (ref 4–5.4)
SODIUM SERPL-SCNC: 139 MMOL/L (ref 136–145)
WBC # BLD AUTO: 7.71 K/UL (ref 3.9–12.7)

## 2024-10-21 PROCEDURE — 36415 COLL VENOUS BLD VENIPUNCTURE: CPT | Mod: PO

## 2024-10-21 PROCEDURE — 80053 COMPREHEN METABOLIC PANEL: CPT

## 2024-10-21 PROCEDURE — 85027 COMPLETE CBC AUTOMATED: CPT

## 2024-10-24 ENCOUNTER — OFFICE VISIT (OUTPATIENT)
Dept: OPTOMETRY | Facility: CLINIC | Age: 29
End: 2024-10-24
Payer: COMMERCIAL

## 2024-10-24 DIAGNOSIS — H52.13 MYOPIA WITH ASTIGMATISM, BILATERAL: ICD-10-CM

## 2024-10-24 DIAGNOSIS — H52.203 MYOPIA WITH ASTIGMATISM, BILATERAL: ICD-10-CM

## 2024-10-24 DIAGNOSIS — G43.109 OCULAR MIGRAINE: Primary | ICD-10-CM

## 2024-10-24 PROCEDURE — 92014 COMPRE OPH EXAM EST PT 1/>: CPT | Mod: S$GLB,,,

## 2024-10-24 PROCEDURE — 92015 DETERMINE REFRACTIVE STATE: CPT | Mod: S$GLB,,,

## 2024-10-24 PROCEDURE — 99999 PR PBB SHADOW E&M-EST. PATIENT-LVL III: CPT | Mod: PBBFAC,,,

## 2024-10-24 PROCEDURE — 1159F MED LIST DOCD IN RCRD: CPT | Mod: CPTII,S$GLB,,

## 2024-10-24 NOTE — PROGRESS NOTES
HPI    Pt here for annual eye exam. Last exam- 1 year    Pt sts [facility] w/ specs are stable. Pt still has ocular migraines but not   often, had one last week. Pt denies pain or floaters. Pt denies use of   gtt.   Last edited by Yareli Go on 10/24/2024 11:20 AM.            Assessment /Plan     For exam results, see Encounter Report.    Ocular migraine    Myopia with astigmatism, bilateral      Longstanding with no increase in occurrences. Ed pt on benign nature of ocular migraines. Discussed various potential triggers of ocular migraines and recommended pt maintain a log of occurrences. Ed pt that the only treatment is to uncover and manage triggers. Consider neuro referral if occurrences increasing in frequency or intensity. Otherwise, continue to monitor.   Discussed spectacle options with pt and released final spec rx. Ed pt on change in rx and adaptation.    RTC: 1 year for comprehensive exam or sooner prn

## 2024-11-05 DIAGNOSIS — K59.04 CHRONIC IDIOPATHIC CONSTIPATION: ICD-10-CM

## 2024-11-18 ENCOUNTER — TELEPHONE (OUTPATIENT)
Dept: GASTROENTEROLOGY | Facility: CLINIC | Age: 29
End: 2024-11-18
Payer: COMMERCIAL

## 2024-11-18 NOTE — TELEPHONE ENCOUNTER
----- Message from Landy sent at 11/18/2024 12:33 PM CST -----  Type: Patient call    Who called: Patient    Does the patient know what this is regarding? Requesting a call back to reschedule colonoscopy; please advise    Would the patient rather a call back or response via [facility]? Call    Best call back number: [phone]    Additional information:

## 2025-01-03 DIAGNOSIS — K59.04 CHRONIC IDIOPATHIC CONSTIPATION: ICD-10-CM

## 2025-02-28 ENCOUNTER — PATIENT MESSAGE (OUTPATIENT)
Dept: FAMILY MEDICINE | Facility: CLINIC | Age: 30
End: 2025-02-28

## 2025-02-28 ENCOUNTER — LAB VISIT (OUTPATIENT)
Dept: LAB | Facility: HOSPITAL | Age: 30
End: 2025-02-28
Payer: COMMERCIAL

## 2025-02-28 ENCOUNTER — OFFICE VISIT (OUTPATIENT)
Dept: FAMILY MEDICINE | Facility: CLINIC | Age: 30
End: 2025-02-28
Payer: COMMERCIAL

## 2025-02-28 VITALS
WEIGHT: 181 LBS | DIASTOLIC BLOOD PRESSURE: 68 MMHG | TEMPERATURE: 98 F | SYSTOLIC BLOOD PRESSURE: 100 MMHG | HEART RATE: 67 BPM | BODY MASS INDEX: 34.17 KG/M2 | OXYGEN SATURATION: 99 % | HEIGHT: 61 IN

## 2025-02-28 DIAGNOSIS — F41.1 GAD (GENERALIZED ANXIETY DISORDER): ICD-10-CM

## 2025-02-28 DIAGNOSIS — H60.311 ACUTE DIFFUSE OTITIS EXTERNA OF RIGHT EAR: ICD-10-CM

## 2025-02-28 DIAGNOSIS — F43.10 PTSD (POST-TRAUMATIC STRESS DISORDER): ICD-10-CM

## 2025-02-28 DIAGNOSIS — Z00.00 ENCOUNTER FOR MEDICAL EXAMINATION TO ESTABLISH CARE: Primary | ICD-10-CM

## 2025-02-28 DIAGNOSIS — F60.3 BORDERLINE PERSONALITY DISORDER: ICD-10-CM

## 2025-02-28 DIAGNOSIS — F33.41 RECURRENT MAJOR DEPRESSIVE DISORDER, IN PARTIAL REMISSION: ICD-10-CM

## 2025-02-28 PROBLEM — Z3A.32 32 WEEKS GESTATION OF PREGNANCY: Status: RESOLVED | Noted: 2024-06-30 | Resolved: 2025-02-28

## 2025-02-28 PROBLEM — Z3A.38 38 WEEKS GESTATION OF PREGNANCY: Status: RESOLVED | Noted: 2024-08-10 | Resolved: 2025-02-28

## 2025-02-28 PROBLEM — O21.9 NAUSEA AND VOMITING IN PREGNANCY: Status: RESOLVED | Noted: 2024-08-10 | Resolved: 2025-02-28

## 2025-02-28 PROBLEM — R29.898 WEAKNESS OF BOTH HIPS: Status: RESOLVED | Noted: 2021-11-22 | Resolved: 2025-02-28

## 2025-02-28 PROBLEM — R29.3 POSTURE ABNORMALITY: Status: RESOLVED | Noted: 2021-11-22 | Resolved: 2025-02-28

## 2025-02-28 PROBLEM — Z34.90 ENCOUNTER FOR ELECTIVE INDUCTION OF LABOR: Status: RESOLVED | Noted: 2022-01-26 | Resolved: 2025-02-28

## 2025-02-28 LAB
ALBUMIN SERPL BCP-MCNC: 4.6 G/DL (ref 3.5–5.2)
ALP SERPL-CCNC: 60 U/L (ref 55–135)
ALT SERPL W/O P-5'-P-CCNC: 13 U/L (ref 10–44)
ANION GAP SERPL CALC-SCNC: 7 MMOL/L (ref 8–16)
AST SERPL-CCNC: 16 U/L (ref 10–40)
BASOPHILS # BLD AUTO: 0.09 K/UL (ref 0–0.2)
BASOPHILS NFR BLD: 1.1 % (ref 0–1.9)
BILIRUB SERPL-MCNC: 0.3 MG/DL (ref 0.1–1)
BUN SERPL-MCNC: 16 MG/DL (ref 6–20)
CALCIUM SERPL-MCNC: 9.4 MG/DL (ref 8.7–10.5)
CHLORIDE SERPL-SCNC: 104 MMOL/L (ref 95–110)
CO2 SERPL-SCNC: 29 MMOL/L (ref 23–29)
CREAT SERPL-MCNC: 0.7 MG/DL (ref 0.5–1.4)
DIFFERENTIAL METHOD BLD: ABNORMAL
EOSINOPHIL # BLD AUTO: 0.6 K/UL (ref 0–0.5)
EOSINOPHIL NFR BLD: 7.3 % (ref 0–8)
ERYTHROCYTE [DISTWIDTH] IN BLOOD BY AUTOMATED COUNT: 13.2 % (ref 11.5–14.5)
EST. GFR  (NO RACE VARIABLE): >60 ML/MIN/1.73 M^2
GLUCOSE SERPL-MCNC: 80 MG/DL (ref 70–110)
HCT VFR BLD AUTO: 39.7 % (ref 37–48.5)
HGB BLD-MCNC: 12.6 G/DL (ref 12–16)
IMM GRANULOCYTES # BLD AUTO: 0.02 K/UL (ref 0–0.04)
IMM GRANULOCYTES NFR BLD AUTO: 0.2 % (ref 0–0.5)
LYMPHOCYTES # BLD AUTO: 3.2 K/UL (ref 1–4.8)
LYMPHOCYTES NFR BLD: 37.4 % (ref 18–48)
MCH RBC QN AUTO: 27.5 PG (ref 27–31)
MCHC RBC AUTO-ENTMCNC: 31.7 G/DL (ref 32–36)
MCV RBC AUTO: 87 FL (ref 82–98)
MONOCYTES # BLD AUTO: 0.6 K/UL (ref 0.3–1)
MONOCYTES NFR BLD: 6.9 % (ref 4–15)
NEUTROPHILS # BLD AUTO: 4 K/UL (ref 1.8–7.7)
NEUTROPHILS NFR BLD: 47.1 % (ref 38–73)
NRBC BLD-RTO: 0 /100 WBC
PLATELET # BLD AUTO: 343 K/UL (ref 150–450)
PMV BLD AUTO: 10.1 FL (ref 9.2–12.9)
POTASSIUM SERPL-SCNC: 3.8 MMOL/L (ref 3.5–5.1)
PROT SERPL-MCNC: 7.6 G/DL (ref 6–8.4)
RBC # BLD AUTO: 4.59 M/UL (ref 4–5.4)
SODIUM SERPL-SCNC: 140 MMOL/L (ref 136–145)
TSH SERPL DL<=0.005 MIU/L-ACNC: 1.12 UIU/ML (ref 0.34–5.6)
WBC # BLD AUTO: 8.51 K/UL (ref 3.9–12.7)

## 2025-02-28 PROCEDURE — 99999 PR PBB SHADOW E&M-EST. PATIENT-LVL IV: CPT | Mod: PBBFAC,,,

## 2025-02-28 PROCEDURE — 80053 COMPREHEN METABOLIC PANEL: CPT

## 2025-02-28 PROCEDURE — 36415 COLL VENOUS BLD VENIPUNCTURE: CPT

## 2025-02-28 PROCEDURE — 85025 COMPLETE CBC W/AUTO DIFF WBC: CPT

## 2025-02-28 PROCEDURE — 84443 ASSAY THYROID STIM HORMONE: CPT

## 2025-02-28 RX ORDER — CIPROFLOXACIN AND DEXAMETHASONE 3; 1 MG/ML; MG/ML
4 SUSPENSION/ DROPS AURICULAR (OTIC) 2 TIMES DAILY
Qty: 7.5 ML | Refills: 0 | Status: SHIPPED | OUTPATIENT
Start: 2025-02-28 | End: 2025-03-07

## 2025-02-28 RX ORDER — CLONAZEPAM 0.25 MG/1
0.25 TABLET, ORALLY DISINTEGRATING ORAL DAILY
Status: CANCELLED | OUTPATIENT
Start: 2025-02-28
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
## 2025-03-03 ENCOUNTER — PATIENT MESSAGE (OUTPATIENT)
Dept: PSYCHIATRY | Facility: CLINIC | Age: 30
End: 2025-03-03
Payer: COMMERCIAL

## 2025-03-03 ENCOUNTER — RESULTS FOLLOW-UP (OUTPATIENT)
Dept: FAMILY MEDICINE | Facility: CLINIC | Age: 30
End: 2025-03-03

## 2025-03-17 ENCOUNTER — OFFICE VISIT (OUTPATIENT)
Dept: PSYCHIATRY | Facility: CLINIC | Age: 30
End: 2025-03-17
Payer: COMMERCIAL

## 2025-03-17 DIAGNOSIS — F33.41 RECURRENT MAJOR DEPRESSIVE DISORDER, IN PARTIAL REMISSION: ICD-10-CM

## 2025-03-17 DIAGNOSIS — F43.10 PTSD (POST-TRAUMATIC STRESS DISORDER): ICD-10-CM

## 2025-03-17 DIAGNOSIS — F41.1 GAD (GENERALIZED ANXIETY DISORDER): Primary | ICD-10-CM

## 2025-03-17 PROCEDURE — 98006 SYNCH AUDIO-VIDEO EST MOD 30: CPT | Mod: 95,,, | Performed by: INTERNAL MEDICINE

## 2025-03-17 PROCEDURE — 1159F MED LIST DOCD IN RCRD: CPT | Mod: CPTII,95,, | Performed by: INTERNAL MEDICINE

## 2025-03-17 PROCEDURE — 1160F RVW MEDS BY RX/DR IN RCRD: CPT | Mod: CPTII,95,, | Performed by: INTERNAL MEDICINE

## 2025-03-17 PROCEDURE — 90833 PSYTX W PT W E/M 30 MIN: CPT | Mod: 95,,, | Performed by: INTERNAL MEDICINE

## 2025-03-17 RX ORDER — SERTRALINE HYDROCHLORIDE 100 MG/1
100 TABLET, FILM COATED ORAL DAILY
Qty: 30 TABLET | Refills: 2 | Status: SHIPPED | OUTPATIENT
Start: 2025-03-17

## 2025-03-17 RX ORDER — BUSPIRONE HYDROCHLORIDE 5 MG/1
5 TABLET ORAL 2 TIMES DAILY PRN
Qty: 60 TABLET | Refills: 2 | Status: SHIPPED | OUTPATIENT
Start: 2025-03-17 | End: 2026-03-17

## 2025-03-17 NOTE — PROGRESS NOTES
OUTPATIENT PSYCHIATRY RETURN VISIT    ENCOUNTER DATE:  3/17/25   SITE:  [facility], [facility]  LENGTH OF SESSION:  28 minutes    The patient location is:  [place], not in a healthcare facility  Visit type:  audiovisual    Face to Face time with patient:  28 minutes  35 minutes of total time spent on the encounter, which includes face to face time and non-face to face time preparing to see the patient (eg, review of tests), Obtaining and/or reviewing separately obtained history, Documenting clinical information in the electronic or other health record, Independently interpreting results (not separately reported) and communicating results to the patient/family/caregiver, or Care coordination (not separately reported).     Each patient to whom he or she provides medical services by telemedicine is:  (1) informed of the relationship between the physician and patient and the respective role of any other health care provider with respect to management of the patient; and (2) notified that he or she may decline to receive medical services by telemedicine and may withdraw from such care at any time.    CHIEF COMPLAINT:  Anxiety      HISTORY OF PRESENTING ILLNESS:  Bhargavi Hitchcock is a 29 y.o. female with history of Depression, Anxiety, PTSD, and Mood disorder (R/o borderline personality disorder versus bipolar 2 disorder) who presents for follow up appointment.      Plan at last appointment on 9/13/2024:  Symptoms have remained stable on Zoloft 75mg daily.    Zoloft has improved her voices.  She is not currently hearing voices but agrees to let me know if this occurs again.  If it does, could consider increasing Zoloft dose (since this has been helpful for the voices) or adding low dose antipsychotic.    Discussed with patient informed consent, risks versus benefits (especially while breastfeeding), alternative treatments, side effect profile and the inherent unpredictability of individual responses to these  "treatments.  The patient expresses understanding of the above and displays the capacity to agree with this current plan.    History as told by patient:  Increased Zoloft to 100mg daily about 2 weeks ago.  Says she is doing "ok."  100mg has been good as far as brain power.  Feels she is feeling scared all the time.  Therapist diagnosed her with CPTSD and Social anxiety disorder.  Had a panic attack outside of her doctor's office a few weeks ago.  She was crying and scared when she was there.  Asked Ob for Klonopin refill but says she got mad at her.  Has been having trouble in her relationship.  Her  doesn't want to be a parent.  He has been really angry.  She feels anxious and scared all the time.  Denies that  has ever been abusive.  Says its palpable that he is not in a good mood and he will be verbal about it.  Current administration is very scary too.  Hard for her to mentally compensate to her 's emotion because she has her 2 babies.   just started to see a therapist but he does not want to try medication.   yells a lot, then patient reacts to him.  He has never hurt the children or her.  She is working really hard not to spiral and catastrophize.  Her therapist is Nissa Marie [facility] - sees her tomorrow.  Has been seeing her almost a year.  Not sleeping well.  Baby sleeps through the night.  Toddler is amazing too.  She is ready to leave.  She is also in a financial situation - feels very stuck and traped.   is  for [employer].  Patient wants to move to [place] - says she always has.  He says he will financially support them if she and the kids leave.  Her brother who is autistic lives with them.      Psychotherapy:  Target symptoms: anxiety   Why chosen therapy is appropriate versus another modality: relevant to diagnosis, patient responds to this modality  Outcome monitoring methods: self-report, observation  Therapeutic intervention type: supportive " psychotherapy  Topics discussed/themes: relationships difficulties, building skills sets for symptom management, symptom recognition  The patient's response to the intervention is guarded. The patient's progress toward treatment goals is fair.   Duration of intervention: 18 minutes.    Medication side effects:  Denies  Medication compliance:  Yes    PSYCHIATRIC REVIEW OF SYSTEMS:  Trouble with sleep:  Sometimes  Appetite changes:  Not discussed today  Weight changes:  Not discussed today  Lack of energy:  Sometimes  Anhedonia:  Yes due to anxiety  Somatic symptoms:  Denies  Libido:  Not discussed  Anxiety/panic:  Yes, severe  Guilty/hopeless:  Sometimes  Self-injurious behavior/risky behavior:  Denies  Any drugs:  Denies  Alcohol:  Occasional wine  Breastfeeding:  Yes    MEDICAL REVIEW OF SYSTEMS:  Complete review of systems performed covering Constitutional, Musculoskeletal, Neurologic.  All systems negative except for that covered in HPI.    PAST PSYCHIATRIC, MEDICAL, AND SOCIAL HISTORY REVIEWED  The patient's past medical, family and social history have been reviewed and updated as appropriate within the electronic medical record - see encounter notes.    MEDICATIONS:    Current Outpatient Medications:     albuterol (PROVENTIL/VENTOLIN HFA) 90 mcg/actuation inhaler, Inhale 1-2 puffs into the lungs every 6 (six) hours as needed for Wheezing., Disp: 18 g, Rfl: 3    busPIRone (BUSPAR) 5 MG Tab, Take 1 tablet (5 mg total) by mouth 2 (two) times daily as needed (anxiety)., Disp: 60 tablet, Rfl: 2    clonazePAM (KLONOPIN) 0.25 MG TbDL, , Disp: , Rfl:     linaCLOtide (LINZESS) 72 mcg Cap capsule, Take 1 capsule (72 mcg total) by mouth before breakfast., Disp: 90 capsule, Rfl: 0    sertraline (ZOLOFT) 100 MG tablet, Take 1 tablet (100 mg total) by mouth once daily., Disp: 30 tablet, Rfl: 2    ALLERGIES:  Review of patient's allergies indicates:   Allergen Reactions    Ketorolac Shortness Of Breath    Benzonatate  "Palpitations       PSYCHIATRIC EXAM:  There were no vitals filed for this visit.  Appearance:  Well groomed, appearing healthy and of stated age  Behavior:  Cooperative, pleasant, no psychomotor agitation or retardation  Speech:  Normal rate, rhythm, prosody, and volume  Mood:  "Scared"  Affect:  Congruent, tearful  Thought Process:  Linear, logical, goal directed  Thought Content:  Negative for suicidal ideation, homicidal ideation, delusions or hallucinations.  Associations:  Intact  Memory:  Grossly Intact  Level of Consciousness/Orientation:  Grossly intact  Fund of Knowledge:  Good  Attention:  Good  Language:  Fluent, able to name abstract and concrete objects  Insight:  Good  Judgment:  Intact  Psychomotor signs:  No abnormal movements of face  Gait:  Unable to assess via virtual visit      RELEVANT LABS/STUDIES:    Lab Results   Component Value Date    WBC 8.51 02/28/2025    HGB 12.6 02/28/2025    HCT 39.7 02/28/2025    MCV 87 02/28/2025     02/28/2025     BMP  Lab Results   Component Value Date     02/28/2025    K 3.8 02/28/2025     02/28/2025    CO2 29 02/28/2025    BUN 16 02/28/2025    CREATININE 0.7 02/28/2025    CALCIUM 9.4 02/28/2025    ANIONGAP 7 (L) 02/28/2025    ESTGFRAFRICA >60.0 10/08/2018    EGFRNONAA >60.0 10/08/2018     Lab Results   Component Value Date    ALT 13 02/28/2025    AST 16 02/28/2025    ALKPHOS 60 02/28/2025    BILITOT 0.3 02/28/2025     Lab Results   Component Value Date    TSH 1.121 02/28/2025     Lab Results   Component Value Date    HGBA1C 5.0 09/13/2023       IMPRESSION:    Bhargavi Hitchcock is a 29 y.o. female with history of Depression, Anxiety, PTSD, and Mood disorder (R/o borderline personality disorder versus bipolar 2 disorder) who presents for follow up appointment.      Status/Progress:  Based on the examination today, the patient's problem(s) is/are inadequately controlled.  New problems have been presented today.    Risk Parameters:  Patient reports no " suicidal ideation  Patient reports no homicidal ideation  Patient reports no self-injurious behavior  Patient reports no violent behavior        DIAGNOSES:    ICD-10-CM ICD-9-CM   1. JUAN R (generalized anxiety disorder)  F41.1 300.02   2. Recurrent major depressive disorder, in partial remission  F33.41 296.35   3. Complex PTSD (post-traumatic stress disorder)  F43.10 309.81       PLAN:  Patient has been on Zoloft 100mg daily for about 2 weeks.  She would like to continue this dose for now.    Discussed with patient informed consent, risks versus benefits (especially while breastfeeding), alternative treatments, side effect profile and the inherent unpredictability of individual responses to these treatments.  The patient expresses understanding of the above and displays the capacity to agree with this current plan.  Continue individual psychotherapy with Nissa Marie LPC.    RETURN TO CLINIC:  Follow up in about 2 weeks (around 3/31/2025).

## 2025-03-26 ENCOUNTER — PATIENT MESSAGE (OUTPATIENT)
Dept: PSYCHIATRY | Facility: CLINIC | Age: 30
End: 2025-03-26
Payer: COMMERCIAL

## 2025-03-26 PROBLEM — F60.3 BORDERLINE PERSONALITY DISORDER: Status: RESOLVED | Noted: 2022-09-24 | Resolved: 2025-03-26

## 2025-06-10 DIAGNOSIS — F43.10 PTSD (POST-TRAUMATIC STRESS DISORDER): ICD-10-CM

## 2025-06-10 DIAGNOSIS — F41.1 GAD (GENERALIZED ANXIETY DISORDER): ICD-10-CM

## 2025-06-10 DIAGNOSIS — F33.41 RECURRENT MAJOR DEPRESSIVE DISORDER, IN PARTIAL REMISSION: ICD-10-CM

## 2025-06-11 RX ORDER — SERTRALINE HYDROCHLORIDE 100 MG/1
TABLET, FILM COATED ORAL
Qty: 30 TABLET | Refills: 2 | Status: SHIPPED | OUTPATIENT
Start: 2025-06-11

## 2025-06-11 RX ORDER — BUSPIRONE HYDROCHLORIDE 5 MG/1
TABLET ORAL
Qty: 60 TABLET | Refills: 2 | Status: SHIPPED | OUTPATIENT
Start: 2025-06-11

## 2025-08-10 ENCOUNTER — PATIENT MESSAGE (OUTPATIENT)
Dept: PSYCHIATRY | Facility: CLINIC | Age: 30
End: 2025-08-10
Payer: COMMERCIAL

## 2025-08-13 DIAGNOSIS — R06.2 WHEEZING: ICD-10-CM

## 2025-08-13 RX ORDER — ALBUTEROL SULFATE 90 UG/1
INHALANT RESPIRATORY (INHALATION)
Qty: 18 G | Refills: 3 | Status: SHIPPED | OUTPATIENT
Start: 2025-08-13